# Patient Record
Sex: FEMALE | Race: BLACK OR AFRICAN AMERICAN | Employment: OTHER | URBAN - METROPOLITAN AREA
[De-identification: names, ages, dates, MRNs, and addresses within clinical notes are randomized per-mention and may not be internally consistent; named-entity substitution may affect disease eponyms.]

---

## 2017-01-25 RX ORDER — SIMVASTATIN 40 MG/1
40 TABLET, FILM COATED ORAL
Qty: 30 TAB | Refills: 11 | Status: SHIPPED | OUTPATIENT
Start: 2017-01-25 | End: 2018-01-20 | Stop reason: SDUPTHER

## 2017-02-24 RX ORDER — COLCHICINE 0.6 MG/1
0.6 TABLET ORAL DAILY
Qty: 30 TAB | Refills: 11 | Status: SHIPPED | OUTPATIENT
Start: 2017-02-24 | End: 2018-02-02 | Stop reason: SDUPTHER

## 2017-04-06 ENCOUNTER — OFFICE VISIT (OUTPATIENT)
Dept: INTERNAL MEDICINE CLINIC | Age: 77
End: 2017-04-06

## 2017-04-06 VITALS
OXYGEN SATURATION: 95 % | HEART RATE: 69 BPM | TEMPERATURE: 97.1 F | RESPIRATION RATE: 17 BRPM | SYSTOLIC BLOOD PRESSURE: 139 MMHG | HEIGHT: 61 IN | DIASTOLIC BLOOD PRESSURE: 59 MMHG | WEIGHT: 137.1 LBS | BODY MASS INDEX: 25.89 KG/M2

## 2017-04-06 DIAGNOSIS — M19.90 ARTHRITIS: ICD-10-CM

## 2017-04-06 DIAGNOSIS — Z00.00 ROUTINE GENERAL MEDICAL EXAMINATION AT A HEALTH CARE FACILITY: Primary | ICD-10-CM

## 2017-04-06 DIAGNOSIS — Z13.39 SCREENING FOR ALCOHOLISM: ICD-10-CM

## 2017-04-06 DIAGNOSIS — M25.552 HIP PAIN, LEFT: ICD-10-CM

## 2017-04-06 DIAGNOSIS — M25.552 LEFT HIP PAIN: ICD-10-CM

## 2017-04-06 DIAGNOSIS — I10 ESSENTIAL HYPERTENSION: ICD-10-CM

## 2017-04-06 DIAGNOSIS — F17.210 CIGARETTE SMOKER: ICD-10-CM

## 2017-04-06 DIAGNOSIS — J44.9 CHRONIC OBSTRUCTIVE PULMONARY DISEASE, UNSPECIFIED COPD TYPE (HCC): ICD-10-CM

## 2017-04-06 RX ORDER — HYDROCODONE BITARTRATE AND ACETAMINOPHEN 5; 325 MG/1; MG/1
1 TABLET ORAL
Qty: 60 TAB | Refills: 0 | Status: SHIPPED | OUTPATIENT
Start: 2017-04-06 | End: 2017-07-06 | Stop reason: SDUPTHER

## 2017-04-06 RX ORDER — PNEUMOCOCCAL 13-VALENT CONJUGATE VACCINE 2.2; 2.2; 2.2; 2.2; 2.2; 4.4; 2.2; 2.2; 2.2; 2.2; 2.2; 2.2; 2.2 UG/.5ML; UG/.5ML; UG/.5ML; UG/.5ML; UG/.5ML; UG/.5ML; UG/.5ML; UG/.5ML; UG/.5ML; UG/.5ML; UG/.5ML; UG/.5ML; UG/.5ML
INJECTION, SUSPENSION INTRAMUSCULAR
Refills: 0 | COMMUNITY
Start: 2017-02-06 | End: 2017-11-17 | Stop reason: ALTCHOICE

## 2017-04-06 RX ORDER — TETANUS TOXOID, REDUCED DIPHTHERIA TOXOID AND ACELLULAR PERTUSSIS VACCINE, ADSORBED 5; 2.5; 8; 8; 2.5 [IU]/.5ML; [IU]/.5ML; UG/.5ML; UG/.5ML; UG/.5ML
SUSPENSION INTRAMUSCULAR
Refills: 0 | COMMUNITY
Start: 2017-02-06 | End: 2017-11-17 | Stop reason: ALTCHOICE

## 2017-04-07 LAB
AMPHETAMINES UR QL SCN: NEGATIVE NG/ML
BARBITURATES UR QL SCN: NEGATIVE NG/ML
BENZODIAZ UR QL SCN: NEGATIVE NG/ML
BZE UR QL SCN: NEGATIVE NG/ML
CANNABINOIDS UR QL SCN: NEGATIVE NG/ML
CREAT UR-MCNC: 272.8 MG/DL (ref 20–300)
FENTANYL+NORFENTANYL UR QL SCN: NEGATIVE PG/ML
MEPERIDINE UR QL: NEGATIVE NG/ML
METHADONE UR QL SCN: NEGATIVE NG/ML
OPIATES UR QL SCN: NEGATIVE NG/ML
OXYCODONE+OXYMORPHONE UR QL SCN: NEGATIVE NG/ML
PCP UR QL: NEGATIVE NG/ML
PH UR: 6.2 [PH] (ref 4.5–8.9)
PLEASE NOTE:, 733157: NORMAL
PROPOXYPH UR QL SCN: NEGATIVE NG/ML
SP GR UR: 1.03
TRAMADOL UR QL SCN: NEGATIVE NG/ML

## 2017-06-26 RX ORDER — AMLODIPINE BESYLATE 5 MG/1
TABLET ORAL
Qty: 30 TAB | Refills: 11 | Status: SHIPPED | OUTPATIENT
Start: 2017-06-26 | End: 2018-06-02 | Stop reason: SDUPTHER

## 2017-07-06 ENCOUNTER — OFFICE VISIT (OUTPATIENT)
Dept: INTERNAL MEDICINE CLINIC | Age: 77
End: 2017-07-06

## 2017-07-06 VITALS
HEART RATE: 64 BPM | WEIGHT: 132.6 LBS | TEMPERATURE: 98.4 F | DIASTOLIC BLOOD PRESSURE: 69 MMHG | OXYGEN SATURATION: 93 % | SYSTOLIC BLOOD PRESSURE: 111 MMHG | HEIGHT: 61 IN | RESPIRATION RATE: 14 BRPM | BODY MASS INDEX: 25.04 KG/M2

## 2017-07-06 DIAGNOSIS — M19.90 ARTHRITIS: ICD-10-CM

## 2017-07-06 DIAGNOSIS — Z71.89 ACP (ADVANCE CARE PLANNING): ICD-10-CM

## 2017-07-06 DIAGNOSIS — J44.9 CHRONIC OBSTRUCTIVE PULMONARY DISEASE, UNSPECIFIED COPD TYPE (HCC): ICD-10-CM

## 2017-07-06 DIAGNOSIS — I10 ESSENTIAL HYPERTENSION: ICD-10-CM

## 2017-07-06 DIAGNOSIS — F17.210 CIGARETTE SMOKER: ICD-10-CM

## 2017-07-06 DIAGNOSIS — M25.552 LEFT HIP PAIN: Primary | ICD-10-CM

## 2017-07-06 DIAGNOSIS — G89.4 CHRONIC PAIN SYNDROME: ICD-10-CM

## 2017-07-06 DIAGNOSIS — Z13.6 SCREENING FOR AAA (ABDOMINAL AORTIC ANEURYSM): ICD-10-CM

## 2017-07-06 RX ORDER — HYDROCODONE BITARTRATE AND ACETAMINOPHEN 5; 325 MG/1; MG/1
1 TABLET ORAL
Qty: 60 TAB | Refills: 0 | Status: SHIPPED | OUTPATIENT
Start: 2017-07-06 | End: 2017-08-29 | Stop reason: SDUPTHER

## 2017-07-06 RX ORDER — CELECOXIB 200 MG/1
200 CAPSULE ORAL 2 TIMES DAILY
Qty: 60 CAP | Refills: 2 | Status: SHIPPED | OUTPATIENT
Start: 2017-07-06 | End: 2017-11-17 | Stop reason: SDUPTHER

## 2017-07-06 NOTE — MR AVS SNAPSHOT
Visit Information Date & Time Provider Department Dept. Phone Encounter #  
 7/6/2017  9:30 AM Patrick Rothman 80 Sports Medicine and [facility] 34 [id number] Follow-up Instructions Return in about 6 weeks (around 8/17/2017). Follow-up and Disposition History Upcoming Health Maintenance Date Due INFLUENZA AGE 9 TO ADULT 8/1/2017 GLAUCOMA SCREENING Q2Y 10/16/2017 Pneumococcal 65+ Low/Medium Risk (2 of 2 - PPSV23) 12/15/2017 MEDICARE YEARLY EXAM 4/7/2018 DTaP/Tdap/Td series (2 - Td) 12/15/2026 Allergies as of 7/6/2017  Review Complete On: 7/6/2017 By: Maria Elena Lyon MD  
  
 Severity Noted Reaction Type Reactions Lisinopril  12/12/2013   Systemic Hives, Swelling Current Immunizations  Never Reviewed No immunizations on file. Not reviewed this visit You Were Diagnosed With   
  
 Codes Comments Left hip pain    -  Primary ICD-10-CM: E66.273 ICD-9-CM: 719.45 Arthritis     ICD-10-CM: M19.90 ICD-9-CM: 716.90 Cigarette smoker     ICD-10-CM: F17.210 ICD-9-CM: 305.1 Chronic obstructive pulmonary disease, unspecified COPD type ([facility]     ICD-10-CM: J44.9 ICD-9-CM: 050 Essential hypertension     ICD-10-CM: I10 
ICD-9-CM: 401.9 Chronic pain syndrome     ICD-10-CM: G89.4 ICD-9-CM: 338.4 Screening for AAA (abdominal aortic aneurysm)     ICD-10-CM: Z13.6 ICD-9-CM: V81.2 ACP (advance care planning)     ICD-10-CM: Z71.89 ICD-9-CM: V65.49 Vitals BP Pulse Temp Resp Height(growth percentile) Weight(growth percentile) 111/69 (BP 1 Location: Left arm, BP Patient Position: Sitting) 64 98.4 °F (36.9 °C) (Oral) 14 5' 1\" (1.549 m) 132 lb 9.6 oz (60.1 kg) SpO2 BMI OB Status Smoking Status 93% 25.05 kg/m2 Postmenopausal Never Smoker BMI and BSA Data Body Mass Index Body Surface Area 25.05 kg/m 2 1.61 m 2 Preferred Pharmacy Pharmacy Name Phone [facility], [address] [place] [address] [phone] Your Updated Medication List  
  
   
This list is accurate as of: 7/6/17 12:24 PM.  Always use your most recent med list. amLODIPine 5 mg tablet Commonly known as:  NORVASC  
take 1 tablet by mouth once daily BOOSTRIX TDAP 2.5-8-5 Lf-mcg-Lf/0.5mL Syrg Generic drug:  Diphth, Pertus(Acell), Tetanus  
inject 0.5 milliliter intramuscularly  
  
 celecoxib 200 mg capsule Commonly known as:  CELEBREX Take 1 Cap by mouth two (2) times a day. colchicine 0.6 mg tablet Take 1 Tab by mouth daily. Indications: GOUT PREVENTION  
  
 HYDROcodone-acetaminophen 5-325 mg per tablet Commonly known as:  Janas [place] Take 1 Tab by mouth every six (6) hours as needed for Pain. Max Daily Amount: 4 Tabs. levothyroxine 25 mcg tablet Commonly known as:  SYNTHROID Take 1 Tab by mouth Daily (before breakfast). lidocaine 5 % Commonly known as:  Daneil [place] 1 patch by TransDERmal route every twenty-four (24) hours. Apply affected area for 12 hours a day and remove for 12 hours a day. PREVNAR 13 (PF) 0.5 mL Syrg injection Generic drug:  pneumococcal 13 kenneth conj dip  
inject 0.5 milliliter intramuscularly  
  
 simvastatin 40 mg tablet Commonly known as:  ZOCOR Take 1 Tab by mouth nightly. Prescriptions Printed Refills HYDROcodone-acetaminophen (NORCO) 5-325 mg per tablet 0 Sig: Take 1 Tab by mouth every six (6) hours as needed for Pain. Max Daily Amount: 4 Tabs. Class: Print Route: Oral  
  
Prescriptions Sent to Pharmacy Refills  
 celecoxib (CELEBREX) 200 mg capsule 2 Sig: Take 1 Cap by mouth two (2) times a day. Class: Normal  
 Pharmacy: [address] [place] [address] #: [phone] Route: Oral  
  
We Performed the Following FULL CODE [COD2 Custom] PAIN MGMT PANEL W/REFL, UR [ABE18320 Custom] REFERRAL TO ORTHOPEDICS [QTM299 Custom] REFERRAL TO PODIATRY [REF90 Custom] Follow-up Instructions Return in about 6 weeks (around 8/17/2017). To-Do List   
 07/06/2017 Imaging:  GREG MAMMO BI SCREENING INCL CAD   
  
 07/06/2017 PFT:  PULMONARY FUNCTION TEST   
  
 07/06/2017 Imaging:  US ABD COMP   
  
 07/06/2017 Imaging:  XR SPINE LUMB 2 OR 3 V As directed Imaging:  XR HIPS BI W PELV 3 OR 4 VWS Referral Information Referral ID Referred By Referred To  
  
 [id number] Luisa sherwood, [address] MD   
   [address] [facility] [address] Phone: [phone] Fax: [phone] Visits Status Start Date End Date 1 New Request 7/6/17 7/6/18 If your referral has a status of pending review or denied, additional information will be sent to support the outcome of this decision. Referral ID Referred By Referred To  
 [id number] Sena Durham Marylen Beers, DPM  
   [address] [facility] [address] [place] [address] At [address] [place] [address] Phone: [phone] Fax: [phone] Visits Status Start Date End Date 1 New Request 7/6/17 7/6/18 If your referral has a status of pending review or denied, additional information will be sent to support the outcome of this decision. Introducing [facility] & HEALTH SERVICES! [employer] introduces [employer] patient portal. Now you can access parts of your medical record, email your doctor's office, and request medication refills online. 1. In your internet browser, go to [email or web address] 2. Click on the First Time User? Click Here link in the Sign In box. You will see the New Member Sign Up page. 3. Enter your [employer] Access Code exactly as it appears below. You will not need to use this code after youve completed the sign-up process. If you do not sign up before the expiration date, you must request a new code. · [employer] Access Code: [email or web address] Expires: 10/4/2017 12:24 PM 
 
4. Enter the last four digits of your Social Security Number (xxxx) and Date of Birth (mm/dd/yyyy) as indicated and click Submit. You will be taken to the next sign-up page. 5. Create a [employer] ID. This will be your [employer] login ID and cannot be changed, so think of one that is secure and easy to remember. 6. Create a [employer] password. You can change your password at any time. 7. Enter your Password Reset Question and Answer. This can be used at a later time if you forget your password. 8. Enter your e-mail address. You will receive e-mail notification when new information is available in [address] 9. Click Sign Up. You can now view and download portions of your medical record. 10. Click the Download Summary menu link to download a portable copy of your medical information. If you have questions, please visit the Frequently Asked Questions section of the [employer] website. Remember, [employer] is NOT to be used for urgent needs. For medical emergencies, dial 911. Now available from your iPhone and Android! Please provide this summary of care documentation to your next provider. Your primary care clinician is listed as Nu Arredondo. If you have any questions after today's visit, please call [phone].

## 2017-07-06 NOTE — PROGRESS NOTES
Chief Complaint   Patient presents with    Hypertension     follow up      1. Have you been to the ER, urgent care clinic since your last visit? Hospitalized since your last visit? No    2. Have you seen or consulted any other health care providers outside of the [address] [place] [address] since your last visit? Include any pap smears or colon screening.  No

## 2017-07-06 NOTE — PROGRESS NOTES
SPORTS MEDICINE AND PRIMARY CARE  Marc Sanchez MD, [address] [address]  Phone:  [phone]  Fax: [phone]       Chief Complaint   Patient presents with    Hypertension     follow up    . SUBJECTIVE:    Tiana Manrique is a 68 y.o. female The patient returns today alert, appropriate and ambulatory with the assistance of a cane and has the capacity to give an accurate history. She has a known history of htn,copd,DJD and is seen for evaluation. The patient returns today complaining of left hip discomfort. She walks with an arthralgic gait because of the discomfort. She takes Norco, but notes that Aleve sometimes works better. She also has a form from [facility] regarding spirometry, creatinine clearance, mammograms, exercise for arthritis, advanced directive, annual wellness and cholesterol screening, all of which are up to date. Current Outpatient Prescriptions   Medication Sig Dispense Refill    HYDROcodone-acetaminophen (NORCO) 5-325 mg per tablet Take 1 Tab by mouth every six (6) hours as needed for Pain. Max Daily Amount: 4 Tabs. 60 Tab 0    celecoxib (CELEBREX) 200 mg capsule Take 1 Cap by mouth two (2) times a day. 60 Cap 2    amLODIPine (NORVASC) 5 mg tablet take 1 tablet by mouth once daily 30 Tab 11    colchicine 0.6 mg tablet Take 1 Tab by mouth daily. Indications: GOUT PREVENTION 30 Tab 11    simvastatin (ZOCOR) 40 mg tablet Take 1 Tab by mouth nightly. 30 Tab 11    levothyroxine (SYNTHROID) 25 mcg tablet Take 1 Tab by mouth Daily (before breakfast). 30 Tab 11    lidocaine (LIDODERM) 5 %(700 mg/patch) 1 patch by TransDERmal route every twenty-four (24) hours. Apply affected area for 12 hours a day and remove for 12 hours a day.  1 Package 11    BOOSTRIX TDAP 2.5-8-5 Lf-mcg-Lf/0.5mL syrg inject 0.5 milliliter intramuscularly  0    PREVNAR 13, PF, 0.5 mL syrg injection inject 0.5 milliliter intramuscularly  0     Past Medical History:   Diagnosis Date    Arthritis     Cigarette smoker     ct 6/25/13    COPD (chronic obstructive pulmonary disease) (HCC)     Hip pain, left     Hypertension     Left hip pain     Onychomycosis     S/P colonoscopy 12-12-13    cecal lipoma    S/P colonoscopy 12/12/2013    hemorrhoids,diverticulosis    Tubulovillous adenoma of colon 2008     History reviewed. No pertinent surgical history. Allergies   Allergen Reactions    Lisinopril Hives and Swelling         REVIEW OF SYSTEMS:  General: negative for - chills or fever  ENT: negative for - headaches, nasal congestion or tinnitus  Respiratory: negative for - cough, hemoptysis, shortness of breath or wheezing  Cardiovascular : negative for - chest pain, edema, palpitations or shortness of breath  Gastrointestinal: negative for - abdominal pain, blood in stools, heartburn or nausea/vomiting  Genito-Urinary: no dysuria, trouble voiding, or hematuria  Musculoskeletal: negative for - gait disturbance, joint pain, joint stiffness or joint swelling  Neurological: no TIA or stroke symptoms  Hematologic: no bruises, no bleeding, no swollen glands  Integument: no lumps, mole changes, nail changes or rash  Endocrine: no malaise/lethargy or unexpected weight changes      Social History     Social History    Marital status:      Spouse name: N/A    Number of children: N/A    Years of education: N/A     Social History Main Topics    Smoking status: Never Smoker    Smokeless tobacco: Never Used    Alcohol use No      Comment: occ    Drug use: No    Sexual activity: Not Asked     Other Topics Concern    None     Social History Narrative    Medical History: Polypectomy-tubulovillous adenoma 2008? may 13 2013 chest CT negative    Gyn History: Last mammogram date 05/01/2013. Last menstrual perioddate 1990. OB History: Total pregnancies 0.     Surgical History: Denies Past Surgical History    Hospitalization/Major Diagnostic Procedure: syncope 2007 Family History: Mother:  80 yrs, heart diseaseFather:  62 yrs, MISister(s): aliveBrother(s): alive, 1 :    strokeGrandfather: alive2 brother(s) , 1 sister(s) . Social History: Alcohol Use Patient does not use alcohol. Smoking Status Patient is a former smoker, Quit in: 13 patient    fell off the leg start his cigarettes but assures me that his day she w ill never smoked another cigarette the rest of her life. Marital Status:    . Lives w ith: alone. Education/School: has completed 3 yrs college. 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 Family History   Problem Relation Age of Onset    Diabetes Sister     Diabetes Brother        OBJECTIVE:    Visit Vitals    /69 (BP 1 Location: Left arm, BP Patient Position: Sitting)    Pulse 64    Temp 98.4 °F (36.9 °C) (Oral)    Resp 14    Ht 5' 1\" (1.549 m)    Wt 132 lb 9.6 oz (60.1 kg)    SpO2 93%    BMI 25.05 kg/m2     CONSTITUTIONAL: well , well nourished, appears age appropriate  EYES: perrla, eom intact  ENMT:moist mucous membranes, pharynx clear  NECK: supple. Thyroid normal  RESPIRATORY: Chest: clear bilaterally   CARDIOVASCULAR: Heart: regular rate and rhythm  GASTROINTESTINAL: Abdomen: soft, bowel sounds active  HEMATOLOGIC: no pathological lymph nodes palpated  MUSCULOSKELETAL: Extremities: no edema, pulse 1+   INTEGUMENT: No unusual rashes or suspicious skin lesions noted. Nails appear normal.  NEUROLOGIC: non-focal exam   MENTAL STATUS: alert and oriented, appropriate affect           ASSESSMENT:  1. Left hip pain    2. Arthritis    3. Cigarette smoker    4. Chronic obstructive pulmonary disease, unspecified COPD type ([facility]    5. Essential hypertension    6. Chronic pain syndrome    7. Screening for AAA (abdominal aortic aneurysm)    8.  ACP (advance care planning)      The patient has considerable pain in the hip for which we will refer her to Orthopaedics for an opinion. Appropriate x-rays have been taken today. We will screen for the abdominal aortic aneurysm as recommended and appropriate other laboratory studies as suggested. She has an appointment to see the podiatrist and we will give her a referral.      Reviewed medications with her. Mammograms are noted and are requested. Blood pressure control is at goal.      As we have since we first met her, again we ask her to stop smoking cigarettes. She claims that she is cutting back. She will return to see us in about six weeks or sooner if she needs to. Advance Care Planning (ACP) Provider Conversation Snapshot    Date of ACP Conversation: 07/06/17  Persons included in Conversation:  patient  Length of ACP Conversation in minutes:  <16 minutes (Non-Billable)    Authorized Decision Maker (if patient is incapable of making informed decisions): This person is:   Healthcare Agent/Medical Power of  under Advance Directive  sister        For Patients with Decision Making Capacity:   Values/Goals: Exploration of values, goals, and preferences if recovery is not expected, even with continued medical treatment in the event of:  Imminent death  Severe, permanent brain injury  \"In these circumstances, what matters most to you? \"  Care focused more on comfort or quality of life.     Conversation Outcomes / Follow-Up Plan:   Recommended completion of Advance Directive form after review of ACP materials and conversation with prospective healthcare agent         PLAN:  .  Orders Placed This Encounter    XR SPINE LUMB 2 OR 3 V    XR PELV AP ONLY    XR HIP LT W OR WO PELV 2-3 VWS    XR HIP RT W OR WO PELV 2-3 VWS    US ABD COMP    PAIN MGMT PANEL W/REFL, UR    REFERRAL TO ORTHOPEDICS    REFERRAL TO PODIATRY    HYDROcodone-acetaminophen (NORCO) 5-325 mg per tablet    celecoxib (CELEBREX) 200 mg capsule       Follow-up Disposition:  Return in about 6 weeks (around 8/17/2017). ATTENTION:   This medical record was transcribed using an electronic medical records system. Although proofread, it may and can contain electronic and spelling errors. Other human spelling and other errors may be present. Corrections may be executed at a later time. Please feel free to contact us for any clarifications as needed.

## 2017-07-07 LAB
AMPHETAMINES UR QL SCN: NEGATIVE NG/ML
BARBITURATES UR QL SCN: NEGATIVE NG/ML
BENZODIAZ UR QL SCN: NEGATIVE NG/ML
BZE UR QL SCN: NEGATIVE NG/ML
CANNABINOIDS UR QL SCN: NEGATIVE NG/ML
CREAT UR-MCNC: 311.5 MG/DL (ref 20–300)
FENTANYL+NORFENTANYL UR QL SCN: NEGATIVE PG/ML
MEPERIDINE UR QL: NEGATIVE NG/ML
METHADONE UR QL SCN: NEGATIVE NG/ML
OPIATES UR QL SCN: NEGATIVE NG/ML
OXYCODONE+OXYMORPHONE UR QL SCN: NEGATIVE NG/ML
PCP UR QL: NEGATIVE NG/ML
PH UR: 5.8 [PH] (ref 4.5–8.9)
PLEASE NOTE:, 733157: ABNORMAL
PROPOXYPH UR QL SCN: NEGATIVE NG/ML
SP GR UR: 1.02
TRAMADOL UR QL SCN: NEGATIVE NG/ML

## 2017-08-17 ENCOUNTER — OFFICE VISIT (OUTPATIENT)
Dept: INTERNAL MEDICINE CLINIC | Age: 77
End: 2017-08-17

## 2017-08-17 VITALS
DIASTOLIC BLOOD PRESSURE: 81 MMHG | BODY MASS INDEX: 25.72 KG/M2 | WEIGHT: 131 LBS | RESPIRATION RATE: 18 BRPM | HEIGHT: 60 IN | OXYGEN SATURATION: 97 % | HEART RATE: 75 BPM | SYSTOLIC BLOOD PRESSURE: 125 MMHG | TEMPERATURE: 98.8 F

## 2017-08-17 DIAGNOSIS — M19.90 ARTHRITIS: ICD-10-CM

## 2017-08-17 DIAGNOSIS — M25.552 HIP PAIN, LEFT: Primary | ICD-10-CM

## 2017-08-17 DIAGNOSIS — I10 ESSENTIAL HYPERTENSION: ICD-10-CM

## 2017-08-17 DIAGNOSIS — J44.9 CHRONIC OBSTRUCTIVE PULMONARY DISEASE, UNSPECIFIED COPD TYPE (HCC): ICD-10-CM

## 2017-08-17 NOTE — MR AVS SNAPSHOT
Visit Information Date & Time Provider Department Dept. Phone Encounter #  
 8/17/2017  9:45 AM Consuelo Herrera MD [facility] and [place] [address] [id number] Follow-up Instructions Return in about 2 months (around 10/17/2017). Follow-up and Disposition History Upcoming Health Maintenance Date Due  
 GLAUCOMA SCREENING Q2Y 10/16/2017 INFLUENZA AGE 9 TO ADULT 12/31/2017* Pneumococcal 65+ Low/Medium Risk (2 of 2 - PPSV23) 12/15/2017 MEDICARE YEARLY EXAM 4/7/2018 DTaP/Tdap/Td series (2 - Td) 12/15/2026 *Topic was postponed. The date shown is not the original due date. Allergies as of 8/17/2017  Review Complete On: 8/17/2017 By: Consuelo Herrera MD  
  
 Severity Noted Reaction Type Reactions Lisinopril  12/12/2013   Systemic Hives, Swelling Current Immunizations  Never Reviewed No immunizations on file. Not reviewed this visit You Were Diagnosed With   
  
 Codes Comments Hip pain, left    -  Primary ICD-10-CM: M25.552 ICD-9-CM: 719.45 Chronic obstructive pulmonary disease, unspecified COPD type ([facility]     ICD-10-CM: J44.9 ICD-9-CM: 714 Essential hypertension     ICD-10-CM: I10 
ICD-9-CM: 401.9 Arthritis     ICD-10-CM: M19.90 ICD-9-CM: 716.90 Vitals BP Pulse Temp Resp Height(growth percentile) Weight(growth percentile) 125/81 (BP 1 Location: Left arm, BP Patient Position: Sitting) 75 98.8 °F (37.1 °C) (Oral) 18 5' (1.524 m) 131 lb (59.4 kg) SpO2 BMI OB Status Smoking Status 97% 25.58 kg/m2 Postmenopausal Never Smoker Vitals History BMI and BSA Data Body Mass Index Body Surface Area 25.58 kg/m 2 1.59 m 2 Preferred Pharmacy Pharmacy Name Phone [facility] [address] [place] [address] [phone] Your Updated Medication List  
  
   
This list is accurate as of: 8/17/17 11:50 AM.  Always use your most recent med list. amLODIPine 5 mg tablet Commonly known as:  NORVASC  
take 1 tablet by mouth once daily BOOSTRIX TDAP 2.5-8-5 Lf-mcg-Lf/0.5mL Syrg Generic drug:  Diphth, Pertus(Acell), Tetanus  
inject 0.5 milliliter intramuscularly  
  
 celecoxib 200 mg capsule Commonly known as:  CELEBREX Take 1 Cap by mouth two (2) times a day. colchicine 0.6 mg tablet Take 1 Tab by mouth daily. Indications: GOUT PREVENTION  
  
 HYDROcodone-acetaminophen 5-325 mg per tablet Commonly known as:  Марина Bell Take 1 Tab by mouth every six (6) hours as needed for Pain. Max Daily Amount: 4 Tabs. levothyroxine 25 mcg tablet Commonly known as:  SYNTHROID Take 1 Tab by mouth Daily (before breakfast). lidocaine 5 % Commonly known as:  Maximilian [place] 1 patch by TransDERmal route every twenty-four (24) hours. Apply affected area for 12 hours a day and remove for 12 hours a day. PREVNAR 13 (PF) 0.5 mL Syrg injection Generic drug:  pneumococcal 13 kenneth conj dip  
inject 0.5 milliliter intramuscularly  
  
 simvastatin 40 mg tablet Commonly known as:  ZOCOR Take 1 Tab by mouth nightly. Follow-up Instructions Return in about 2 months (around 10/17/2017). Introducing [facility] & [facility]! [facility] introduces [employer] patient portal. Now you can access parts of your medical record, email your doctor's office, and request medication refills online. 1. In your internet browser, go to [email or web address] 2. Click on the First Time User? Click Here link in the Sign In box. You will see the New Member Sign Up page. 3. Enter your [employer] Access Code exactly as it appears below. You will not need to use this code after youve completed the sign-up process. If you do not sign up before the expiration date, you must request a new code. · [employer] Access Code: [email or web address] Expires: 10/4/2017 12:24 PM 
 
 4. Enter the last four digits of your Social Security Number (xxxx) and Date of Birth (mm/dd/yyyy) as indicated and click Submit. You will be taken to the next sign-up page. 5. Create a [employer] ID. This will be your [employer] login ID and cannot be changed, so think of one that is secure and easy to remember. 6. Create a [employer] password. You can change your password at any time. 7. Enter your Password Reset Question and Answer. This can be used at a later time if you forget your password. 8. Enter your e-mail address. You will receive e-mail notification when new information is available in [address] 9. Click Sign Up. You can now view and download portions of your medical record. 10. Click the Download Summary menu link to download a portable copy of your medical information. If you have questions, please visit the Frequently Asked Questions section of the [employer] website. Remember, [employer] is NOT to be used for urgent needs. For medical emergencies, dial 911. Now available from your iPhone and Android! Please provide this summary of care documentation to your next provider. Your primary care clinician is listed as Melida Peguero. If you have any questions after today's visit, please call [phone].

## 2017-08-17 NOTE — PROGRESS NOTES
Chief Complaint   Patient presents with    Hip Pain     Left hip pain  follow up    Hypertension     follow up, patient also wants to discuss recent visit to ortho     1. Have you been to the ER, urgent care clinic since your last visit? Hospitalized since your last visit? No    2. Have you seen or consulted any other health care providers outside of the [address] [place] [address] since your last visit? Include any pap smears or colon screening.  No

## 2017-08-17 NOTE — PROGRESS NOTES
SPORTS MEDICINE AND PRIMARY CARE  Felicity Ward MD, [address] [place]  [address]  Phone:  [phone]  Fax: [phone]      Chief Complaint   Patient presents with    Hip Pain     Left hip pain  follow up    Hypertension     follow up, patient also wants to discuss recent visit to ortho         SUBECTIVE:    Pietro Gonzalez is a 68 y.o. female Patient returns today alert, appropriate, ambulatory and has the capacity to give an accurate history. She has a known history of COPD, cigarette abuse, left hip pain due to osteoarthritis and is under the care of Dr. Nikki Graham for the hip pain. The patient usually gets her mammograms at [facility]. They sent her to [facility] because of something that was seen on the left, although she's had a biopsy before and therefore has a scar. She had to go back a second time for ultrasound and there are still concerns. Since we last saw her she saw an orthodontist who gave her Tramadol. We reminded her of the contract  physician will not prescribe narcotics. She is using Celebrex for the pain currently. Patient is seen for evaluation. Current Outpatient Prescriptions   Medication Sig Dispense Refill    HYDROcodone-acetaminophen (NORCO) 5-325 mg per tablet Take 1 Tab by mouth every six (6) hours as needed for Pain. Max Daily Amount: 4 Tabs. 60 Tab 0    celecoxib (CELEBREX) 200 mg capsule Take 1 Cap by mouth two (2) times a day. 60 Cap 2    amLODIPine (NORVASC) 5 mg tablet take 1 tablet by mouth once daily 30 Tab 11    colchicine 0.6 mg tablet Take 1 Tab by mouth daily. Indications: GOUT PREVENTION 30 Tab 11    simvastatin (ZOCOR) 40 mg tablet Take 1 Tab by mouth nightly. 30 Tab 11    levothyroxine (SYNTHROID) 25 mcg tablet Take 1 Tab by mouth Daily (before breakfast).  30 Tab 11    BOOSTRIX TDAP 2.5-8-5 Lf-mcg-Lf/0.5mL syrg inject 0.5 milliliter intramuscularly  0    PREVNAR 13, PF, 0.5 mL syrg injection inject 0.5 milliliter intramuscularly  0    lidocaine (LIDODERM) 5 %(700 mg/patch) 1 patch by TransDERmal route every twenty-four (24) hours. Apply affected area for 12 hours a day and remove for 12 hours a day. 1 Package 11     Past Medical History:   Diagnosis Date    Arthritis     Cigarette smoker     ct 13    COPD (chronic obstructive pulmonary disease) (HCC)     Hip pain, left     Hypertension     Left hip pain     Onychomycosis     S/P colonoscopy 13    cecal lipoma    S/P colonoscopy 2013    hemorrhoids,diverticulosis    Tubulovillous adenoma of colon 2008     History reviewed. No pertinent surgical history. Allergies   Allergen Reactions    Lisinopril Hives and Swelling       REVIEW OF SYSTEMS:   No chest pain, no shortness of breath. Social History     Social History    Marital status:      Spouse name: N/A    Number of children: N/A    Years of education: N/A     Social History Main Topics    Smoking status: Never Smoker    Smokeless tobacco: Never Used    Alcohol use No      Comment: occ    Drug use: No    Sexual activity: Not Asked     Other Topics Concern    None     Social History Narrative    Medical History: Polypectomy-tubulovillous adenoma ? may 13 2013 chest CT negative    Gyn History: Last mammogram date 2013. Last menstrual perioddate . OB History: Total pregnancies 0. Surgical History: Denies Past Surgical History    Hospitalization/Major Diagnostic Procedure: syncope     Family History: Mother:  80 yrs, heart diseaseFather:  62 yrs, MISister(s): aliveBrother(s): alive, 1 :    strokeGrandfather: alive2 brother(s) , 1 sister(s) . Social History: Alcohol Use Patient does not use alcohol. Smoking Status Patient is a former smoker, Quit in: 13 patient    fell off the leg start his cigarettes but assures me that his day she w ill never smoked another cigarette the rest of her life.  Marital Status: . Lives w ith: alone. Education/School: has completed 3 yrs college. 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   r  Family History   Problem Relation Age of Onset    Diabetes Sister     Diabetes Brother        OBJECTIVE:  Visit Vitals    /81 (BP 1 Location: Left arm, BP Patient Position: Sitting)    Pulse 75    Temp 98.8 °F (37.1 °C) (Oral)    Resp 18    Ht 5' (1.524 m)    Wt 131 lb (59.4 kg)    SpO2 97%    BMI 25.58 kg/m2     ENT: perrla,  eom intact  NECK: supple. Thyroid normal  CHEST: clear to ascultation and percussion   HEART: regular rate and rhythm  ABD: soft, bowel sounds active  EXTREMITIES: no edema, pulse 1+     Office Visit on 07/06/2017   Component Date Value Ref Range Status    Amphetamine Screen, urine 07/06/2017 Negative  Deevfh=8135 ng/mL Final    Barbiturates Screen, urine 07/06/2017 Negative  Zpcack=505 ng/mL Final    Benzodiazepines Screen, urine 07/06/2017 Negative  Lhiwrm=263 ng/mL Final    Cannabinoid Screen, urine 07/06/2017 Negative  Cutoff=20 ng/mL Final    Cocaine Metab. Screen, urine 07/06/2017 Negative  Dguayj=236 ng/mL Final    Opiate Screen, urine 07/06/2017 Negative  Icyexa=371 ng/mL Final    Opiate test includes Codeine, Morphine, Hydromorphone, Hydrocodone.  Oxycodone/Oxymorphone, urine 07/06/2017 Negative  Tsqyss=418 ng/mL Final    Test includes Oxycodone and Oxymorphone    Phencyclidine Screen, urine 07/06/2017 Negative  Cutoff=25 ng/mL Final    Methadone Screen, urine 07/06/2017 Negative  Xhrpke=001 ng/mL Final    Propoxyphene Screen, urine 07/06/2017 Negative  Xwzjmo=815 ng/mL Final    Meperidine screen 07/06/2017 Negative  Ktludd=626 ng/mL Final    Comment: This test was developed and its performance characteristics  determined by [facility]. It has not been cleared or approved  by the Food and Drug Administration.       FENTANYL, URINE 07/06/2017 Negative  Xopicw=2556 pg/mL Final Comment: Test includes Fentanyl and Norfentanyl  This test was developed and its performance characteristics  determined by [facility]. It has not been cleared or approved  by the Food and Drug Administration.  Tramadol Screen, urine 07/06/2017 Negative  Xgzmkz=482 ng/mL Final    Creatinine, urine 07/06/2017 311.5* 20.0 - 300.0 mg/dL Final    Specific Gravity 07/06/2017 1.022   Final    pH, urine 07/06/2017 5.8  4.5 - 8.9 Final    Please Note 07/06/2017 Comment   Final    Comment: Drug-test results should be interpreted in the context of clinical  information. Patient metabolic variables, specific drug chemistry, and  specimen characteristics can affect test outcome. Technical  consultation is available if a test result is inconsistent with an  expected outcome. ([email or web address] or call toll-free  [phone])  Drug brands, if listed herein, are trademarks of their respective  owners. ASSESSMENT:  1. Hip pain, left    2. Chronic obstructive pulmonary disease, unspecified COPD type ([address]    3. Essential hypertension    4. Arthritis      Unfortunately the patient was not aware that Tramadol is now considered a narcotic. She is aware that we will not give her narcotics for pain because of that. We will reinstitute our contractual agreement with her in the next month or two. Blood pressure control is at goal    The Celebrex seems to be helping the pain a little bit and therefore she'll continue that. She'll return to see us in 2-3 months, sooner if needed. BMI remains stable. PLAN:  . Follow-up Disposition:  Return in about 2 months (around 10/17/2017). ATTENTION:   This medical record was transcribed using an electronic medical records system. Although proofread, it may and can contain electronic and spelling errors. Other human spelling and other errors may be present. Corrections may be executed at a later time.   Please feel free to contact us for any clarifications as needed.

## 2017-08-18 DIAGNOSIS — M19.90 ARTHRITIS: ICD-10-CM

## 2017-08-29 DIAGNOSIS — M19.90 ARTHRITIS: ICD-10-CM

## 2017-08-30 RX ORDER — HYDROCODONE BITARTRATE AND ACETAMINOPHEN 5; 325 MG/1; MG/1
1 TABLET ORAL
Qty: 60 TAB | Refills: 0 | Status: SHIPPED | OUTPATIENT
Start: 2017-08-30 | End: 2017-11-17 | Stop reason: SDUPTHER

## 2017-11-17 ENCOUNTER — OFFICE VISIT (OUTPATIENT)
Dept: INTERNAL MEDICINE CLINIC | Age: 77
End: 2017-11-17

## 2017-11-17 VITALS
HEART RATE: 84 BPM | SYSTOLIC BLOOD PRESSURE: 95 MMHG | OXYGEN SATURATION: 96 % | TEMPERATURE: 98.4 F | DIASTOLIC BLOOD PRESSURE: 60 MMHG | WEIGHT: 131.4 LBS | BODY MASS INDEX: 25.8 KG/M2 | HEIGHT: 60 IN | RESPIRATION RATE: 18 BRPM

## 2017-11-17 DIAGNOSIS — M25.552 LEFT HIP PAIN: ICD-10-CM

## 2017-11-17 DIAGNOSIS — J44.9 CHRONIC OBSTRUCTIVE PULMONARY DISEASE, UNSPECIFIED COPD TYPE (HCC): Primary | ICD-10-CM

## 2017-11-17 DIAGNOSIS — F17.210 CIGARETTE SMOKER: ICD-10-CM

## 2017-11-17 DIAGNOSIS — Z79.891 CHRONIC PRESCRIPTION OPIATE USE: ICD-10-CM

## 2017-11-17 DIAGNOSIS — M19.90 ARTHRITIS: ICD-10-CM

## 2017-11-17 DIAGNOSIS — I10 ESSENTIAL HYPERTENSION: ICD-10-CM

## 2017-11-17 RX ORDER — HYDROCODONE BITARTRATE AND ACETAMINOPHEN 5; 325 MG/1; MG/1
1 TABLET ORAL
Qty: 60 TAB | Refills: 0 | Status: SHIPPED | OUTPATIENT
Start: 2017-11-17 | End: 2018-02-16 | Stop reason: SDUPTHER

## 2017-11-17 RX ORDER — CELECOXIB 200 MG/1
200 CAPSULE ORAL 2 TIMES DAILY
Qty: 60 CAP | Refills: 2 | Status: SHIPPED | OUTPATIENT
Start: 2017-11-17 | End: 2018-03-05 | Stop reason: SDUPTHER

## 2017-11-17 NOTE — MR AVS SNAPSHOT
Visit Information Date & Time Provider Department Dept. Phone Encounter #  
 11/17/2017  9:45 AM Patrick Solano [facility] Sports Medicine and Primary Care [phone] [id number] Follow-up Instructions Return in about 3 months (around 2/17/2018). Follow-up and Disposition History Upcoming Health Maintenance Date Due  
 GLAUCOMA SCREENING Q2Y 10/16/2017 Influenza Age 5 to Adult 12/31/2017* Pneumococcal 65+ Low/Medium Risk (2 of 2 - PPSV23) 12/15/2017 MEDICARE YEARLY EXAM 4/7/2018 DTaP/Tdap/Td series (2 - Td) 12/15/2026 *Topic was postponed. The date shown is not the original due date. Allergies as of 11/17/2017  Review Complete On: 11/17/2017 By: Herminia Thomas MD  
  
 Severity Noted Reaction Type Reactions Lisinopril  12/12/2013   Systemic Hives, Swelling Current Immunizations  Never Reviewed No immunizations on file. Not reviewed this visit You Were Diagnosed With   
  
 Codes Comments Chronic obstructive pulmonary disease, unspecified COPD type ([facility]    -  Primary ICD-10-CM: J44.9 ICD-9-CM: 718 Essential hypertension     ICD-10-CM: I10 
ICD-9-CM: 401.9 Left hip pain     ICD-10-CM: M25.552 ICD-9-CM: 719.45 Cigarette smoker     ICD-10-CM: F17.210 ICD-9-CM: 305.1 Arthritis     ICD-10-CM: M19.90 ICD-9-CM: 716.90 Chronic prescription opiate use     ICD-10-CM: O20.882 ICD-9-CM: V58.69 Vitals BP Pulse Temp Resp Height(growth percentile) Weight(growth percentile) 95/60 84 98.4 °F (36.9 °C) (Oral) 18 5' (1.524 m) 131 lb 6.4 oz (59.6 kg) SpO2 BMI OB Status Smoking Status 96% 25.66 kg/m2 Postmenopausal Never Smoker Vitals History BMI and BSA Data Body Mass Index Body Surface Area  
 25.66 kg/m 2 1.59 m 2 Preferred Pharmacy Pharmacy Name Phone [facility] [address] [place] [address] [phone] Your Updated Medication List  
  
   
This list is accurate as of: 11/17/17 11:55 AM.  Always use your most recent med list. amLODIPine 5 mg tablet Commonly known as:  NORVASC  
take 1 tablet by mouth once daily  
  
 celecoxib 200 mg capsule Commonly known as:  CELEBREX Take 1 Cap by mouth two (2) times a day. colchicine 0.6 mg tablet Take 1 Tab by mouth daily. Indications: GOUT PREVENTION  
  
 HYDROcodone-acetaminophen 5-325 mg per tablet Commonly known as:  Nelma Mary Take 1 Tab by mouth every six (6) hours as needed for Pain. Max Daily Amount: 4 Tabs. levothyroxine 25 mcg tablet Commonly known as:  SYNTHROID Take 1 Tab by mouth Daily (before breakfast). lidocaine 5 % Commonly known as:  [place] Gastelum 1 patch by TransDERmal route every twenty-four (24) hours. Apply affected area for 12 hours a day and remove for 12 hours a day. simvastatin 40 mg tablet Commonly known as:  ZOCOR Take 1 Tab by mouth nightly. Prescriptions Printed Refills HYDROcodone-acetaminophen (NORCO) 5-325 mg per tablet 0 Sig: Take 1 Tab by mouth every six (6) hours as needed for Pain. Max Daily Amount: 4 Tabs. Class: Print Route: Oral  
  
Prescriptions Sent to Pharmacy Refills  
 celecoxib (CELEBREX) 200 mg capsule 2 Sig: Take 1 Cap by mouth two (2) times a day. Class: Normal  
 Pharmacy: [address] [place] [address].  #: [phone] Route: Oral  
  
We Performed the Following CBC WITH AUTOMATED DIFF [60377 CPT(R)] HEMOGLOBIN A1C WITH EAG [15047 CPT(R)] LIPID PANEL [93884 CPT(R)] METABOLIC PANEL, COMPREHENSIVE [93305 CPT(R)] PAIN MGMT PANEL W/REFL, UR [NCX21702 Custom] GA COLLECTION VENOUS BLOOD,VENIPUNCTURE N0808565 CPT(R)] REFERRAL TO OPHTHALMOLOGY [REF57 Custom] TSH 3RD GENERATION [41884 CPT(R)] URINALYSIS W/ RFLX MICROSCOPIC [14805 CPT(R)] Follow-up Instructions Return in about 3 months (around 2/17/2018). Referral Information Referral ID Referred By Referred To  
  
 [id number] Cinthia CLARK Burton LYON Not Available Visits Status Start Date End Date 1 New Request 11/17/17 11/17/18 If your referral has a status of pending review or denied, additional information will be sent to support the outcome of this decision. Introducing [facility]! [facility] introduces [employer] patient portal. Now you can access parts of your medical record, email your doctor's office, and request medication refills online. 1. In your internet browser, go to [email or web address] 2. Click on the First Time User? Click Here link in the Sign In box. You will see the New Member Sign Up page. 3. Enter your [employer] Access Code exactly as it appears below. You will not need to use this code after youve completed the sign-up process. If you do not sign up before the expiration date, you must request a new code. · [employer] Access Code: [email or web address] Expires: 2/15/2018 11:55 AM 
 
4. Enter the last four digits of your Social Security Number (xxxx) and Date of Birth (mm/dd/yyyy) as indicated and click Submit. You will be taken to the next sign-up page. 5. Create a [employer] ID. This will be your [employer] login ID and cannot be changed, so think of one that is secure and easy to remember. 6. Create a [employer] password. You can change your password at any time. 7. Enter your Password Reset Question and Answer. This can be used at a later time if you forget your password. 8. Enter your e-mail address. You will receive e-mail notification when new information is available in [address] 9. Click Sign Up. You can now view and download portions of your medical record. 10. Click the Download Summary menu link to download a portable copy of your medical information. If you have questions, please visit the Frequently Asked Questions section of the [employer] website. Remember, [employer] is NOT to be used for urgent needs. For medical emergencies, dial 911. Now available from your iPhone and Android! Please provide this summary of care documentation to your next provider. Your primary care clinician is listed as Concepción Silva. If you have any questions after today's visit, please call [phone].

## 2017-11-17 NOTE — PROGRESS NOTES
SPORTS MEDICINE AND PRIMARY CARE  Francisco Gutierrez MD, [address] [address]  Phone:  [phone]  Fax: [phone]       Chief Complaint   Patient presents with    Hypertension     f/u   . SUBJECTIVE:    Lindsay Rosenthal is a 68 y.o. female Patient returns today with known history of DJD and was seen by Jennifer Moseley M.D., on 09/17/17, for osteoarthritis of the left hip. Different options he mentioned to her included activity modification, medications, injection and total hip arthroplasty. She has elected to go with medications for now. He is with [address] [place] [address] [address], which is [place] where she lives. She currently complains of left hip pain, as well as left elbow discomfort. She has a known history of COPD, cigarette addiction, primary hypertension, and is seen for evaluation. Current Outpatient Prescriptions   Medication Sig Dispense Refill    celecoxib (CELEBREX) 200 mg capsule Take 1 Cap by mouth two (2) times a day. 60 Cap 2    HYDROcodone-acetaminophen (NORCO) 5-325 mg per tablet Take 1 Tab by mouth every six (6) hours as needed for Pain. Max Daily Amount: 4 Tabs. 60 Tab 0    amLODIPine (NORVASC) 5 mg tablet take 1 tablet by mouth once daily 30 Tab 11    colchicine 0.6 mg tablet Take 1 Tab by mouth daily. Indications: GOUT PREVENTION 30 Tab 11    simvastatin (ZOCOR) 40 mg tablet Take 1 Tab by mouth nightly. 30 Tab 11    levothyroxine (SYNTHROID) 25 mcg tablet Take 1 Tab by mouth Daily (before breakfast). 30 Tab 11    lidocaine (LIDODERM) 5 %(700 mg/patch) 1 patch by TransDERmal route every twenty-four (24) hours. Apply affected area for 12 hours a day and remove for 12 hours a day.  1 Package 11     Past Medical History:   Diagnosis Date    Arthritis     Cigarette smoker     ct 6/25/13    COPD (chronic obstructive pulmonary disease) [facility]     Hypertension     Left hip pain     Onychomycosis     S/P colonoscopy 12-12-13    cecal lipoma    S/P colonoscopy 2013    hemorrhoids,diverticulosis    Tubulovillous adenoma of colon      History reviewed. No pertinent surgical history. Allergies   Allergen Reactions    Lisinopril Hives and Swelling         REVIEW OF SYSTEMS:  General: negative for - chills or fever  ENT: negative for - headaches, nasal congestion or tinnitus  Respiratory: negative for - cough, hemoptysis, shortness of breath or wheezing  Cardiovascular : negative for - chest pain, edema, palpitations or shortness of breath  Gastrointestinal: negative for - abdominal pain, blood in stools, heartburn or nausea/vomiting  Genito-Urinary: no dysuria, trouble voiding, or hematuria  Musculoskeletal: negative for - gait disturbance, joint pain, joint stiffness or joint swelling  Neurological: no TIA or stroke symptoms  Hematologic: no bruises, no bleeding, no swollen glands  Integument: no lumps, mole changes, nail changes or rash  Endocrine: no malaise/lethargy or unexpected weight changes      Social History     Social History    Marital status:      Spouse name: N/A    Number of children: N/A    Years of education: N/A     Social History Main Topics    Smoking status: Never Smoker    Smokeless tobacco: Never Used    Alcohol use No      Comment: occ    Drug use: No    Sexual activity: Not Asked     Other Topics Concern    None     Social History Narrative    Medical History: Polypectomy-tubulovillous adenoma ? may 13 2013 chest CT negative    Gyn History: Last mammogram date 2013. Last menstrual perioddate . OB History: Total pregnancies 0. Surgical History: Denies Past Surgical History    Hospitalization/Major Diagnostic Procedure: syncope     Family History: Mother:  80 yrs, heart diseaseFather:  62 yrs, MISister(s): aliveBrother(s): alive, 1 :    strokeGrandfather: alive2 brother(s) , 1 sister(s) . Social History: Alcohol Use Patient does not use alcohol.  Smoking Status Patient is a former smoker, Quit in: 6-20-13 July 17, 2014 patient    fell off the leg start his cigarettes but assures me that his day she w ill never smoked another cigarette the rest of her life. Marital Status:    . Lives w ith: alone. Education/School: has completed 3 yrs college. 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 Family History   Problem Relation Age of Onset    Diabetes Sister     Diabetes Brother        OBJECTIVE:    Visit Vitals    BP 95/60    Pulse 84    Temp 98.4 °F (36.9 °C) (Oral)    Resp 18    Ht 5' (1.524 m)    Wt 131 lb 6.4 oz (59.6 kg)    SpO2 96%    BMI 25.66 kg/m2     CONSTITUTIONAL: well , well nourished, appears age appropriate  EYES: perrla, eom intact  ENMT:moist mucous membranes, pharynx clear  NECK: supple. Thyroid normal  RESPIRATORY: Chest: clear bilaterally   CARDIOVASCULAR: Heart: regular rate and rhythm  GASTROINTESTINAL: Abdomen: soft, bowel sounds active  HEMATOLOGIC: no pathological lymph nodes palpated  MUSCULOSKELETAL: Extremities: no edema, pulse 1+   INTEGUMENT: No unusual rashes or suspicious skin lesions noted. Nails appear normal.  NEUROLOGIC: non-focal exam   MENTAL STATUS: alert and oriented, appropriate affect           ASSESSMENT:  1. Chronic obstructive pulmonary disease, unspecified COPD type ([address]    2. Essential hypertension    3. Left hip pain    4. Cigarette smoker    5. Arthritis    6. Chronic prescription opiate use      Patient's elbow is related to lateral epicondylitis and will use a strap to see if we can make her more comfortable or a tennis elbow splint. Repeat blood pressure is 120/78, which is completely acceptable. Chronic pain related to the hip, for which she alternates Hydrocodone with Celebrex. We will renew the Hydrocodone. She hasn't seen the eye doctor for a couple years. Will ask her to make that appointment.     Appropriate laboratory studies are requested. She'll return to the office in three months, sooner if she has any problems. This is a chronic problem that is not changed. Per review of available records and patients , there are not sign of overuse, misuse, diversion, or concerning side effects. Today we reviewed: the risk of overdose, addiction, and dependency  The following changes were made to the patients current treatment plan: nothing, medications refilled. PLAN:  .  Orders Placed This Encounter    URINALYSIS W/ RFLX MICROSCOPIC    CBC WITH AUTOMATED DIFF    METABOLIC PANEL, COMPREHENSIVE    LIPID PANEL    TSH 3RD GENERATION    HEMOGLOBIN A1C WITH EAG    PAIN MGMT PANEL W/REFL, UR    REFERRAL TO OPHTHALMOLOGY    celecoxib (CELEBREX) 200 mg capsule    HYDROcodone-acetaminophen (NORCO) 5-325 mg per tablet       Follow-up Disposition:  Return in about 3 months (around 2/17/2018). ATTENTION:   This medical record was transcribed using an electronic medical records system. Although proofread, it may and can contain electronic and spelling errors. Other human spelling and other errors may be present. Corrections may be executed at a later time. Please feel free to contact us for any clarifications as needed.

## 2017-11-17 NOTE — PROGRESS NOTES
1. Have you been to the ER, urgent care clinic since your last visit? Hospitalized since your last visit? No    2. Have you seen or consulted any other health care providers outside of the [address] [place] [address] since your last visit? Include any pap smears or colon screening. No     C/o left hip and right arm pain.  Wants a refill on norco

## 2017-11-19 LAB
ALBUMIN SERPL-MCNC: 4.1 G/DL (ref 3.5–4.8)
ALBUMIN/GLOB SERPL: 1.3 {RATIO} (ref 1.2–2.2)
ALP SERPL-CCNC: 63 IU/L (ref 39–117)
ALT SERPL-CCNC: 6 IU/L (ref 0–32)
AMPHETAMINES UR QL SCN: NEGATIVE NG/ML
APPEARANCE UR: ABNORMAL
AST SERPL-CCNC: 13 IU/L (ref 0–40)
BACTERIA #/AREA URNS HPF: ABNORMAL /[HPF]
BARBITURATES UR QL SCN: NEGATIVE NG/ML
BASOPHILS # BLD AUTO: 0 X10E3/UL (ref 0–0.2)
BASOPHILS NFR BLD AUTO: 1 %
BENZODIAZ UR QL SCN: NEGATIVE NG/ML
BILIRUB SERPL-MCNC: <0.2 MG/DL (ref 0–1.2)
BILIRUB UR QL STRIP: NEGATIVE
BUN SERPL-MCNC: 22 MG/DL (ref 8–27)
BUN/CREAT SERPL: 25 (ref 12–28)
BZE UR QL SCN: NEGATIVE NG/ML
CALCIUM SERPL-MCNC: 10.4 MG/DL (ref 8.7–10.3)
CANNABINOIDS UR QL SCN: NEGATIVE NG/ML
CASTS URNS QL MICRO: ABNORMAL /LPF
CHLORIDE SERPL-SCNC: 103 MMOL/L (ref 96–106)
CHOLEST SERPL-MCNC: 155 MG/DL (ref 100–199)
CO2 SERPL-SCNC: 26 MMOL/L (ref 18–29)
COLOR UR: ABNORMAL
CREAT SERPL-MCNC: 0.88 MG/DL (ref 0.57–1)
CREAT UR-MCNC: 397 MG/DL (ref 20–300)
CRYSTALS URNS MICRO: ABNORMAL
EOSINOPHIL # BLD AUTO: 0.1 X10E3/UL (ref 0–0.4)
EOSINOPHIL NFR BLD AUTO: 2 %
EPI CELLS #/AREA URNS HPF: ABNORMAL /HPF
ERYTHROCYTE [DISTWIDTH] IN BLOOD BY AUTOMATED COUNT: 14.4 % (ref 12.3–15.4)
EST. AVERAGE GLUCOSE BLD GHB EST-MCNC: 108 MG/DL
FENTANYL+NORFENTANYL UR QL SCN: NEGATIVE PG/ML
GFR SERPLBLD CREATININE-BSD FMLA CKD-EPI: 64 ML/MIN/1.73
GFR SERPLBLD CREATININE-BSD FMLA CKD-EPI: 73 ML/MIN/1.73
GLOBULIN SER CALC-MCNC: 3.2 G/DL (ref 1.5–4.5)
GLUCOSE SERPL-MCNC: 94 MG/DL (ref 65–99)
GLUCOSE UR QL: NEGATIVE
HBA1C MFR BLD: 5.4 % (ref 4.8–5.6)
HCT VFR BLD AUTO: 37.3 % (ref 34–46.6)
HDLC SERPL-MCNC: 54 MG/DL
HGB BLD-MCNC: 12.1 G/DL (ref 11.1–15.9)
HGB UR QL STRIP: NEGATIVE
IMM GRANULOCYTES # BLD: 0 X10E3/UL (ref 0–0.1)
IMM GRANULOCYTES NFR BLD: 0 %
KETONES UR QL STRIP: ABNORMAL
LDLC SERPL CALC-MCNC: 78 MG/DL (ref 0–99)
LEUKOCYTE ESTERASE UR QL STRIP: ABNORMAL
LYMPHOCYTES # BLD AUTO: 1.5 X10E3/UL (ref 0.7–3.1)
LYMPHOCYTES NFR BLD AUTO: 35 %
MCH RBC QN AUTO: 29.7 PG (ref 26.6–33)
MCHC RBC AUTO-ENTMCNC: 32.4 G/DL (ref 31.5–35.7)
MCV RBC AUTO: 91 FL (ref 79–97)
MEPERIDINE UR QL: NEGATIVE NG/ML
METHADONE UR QL SCN: NEGATIVE NG/ML
MICRO URNS: ABNORMAL
MONOCYTES # BLD AUTO: 0.4 X10E3/UL (ref 0.1–0.9)
MONOCYTES NFR BLD AUTO: 9 %
MUCOUS THREADS URNS QL MICRO: PRESENT
NEUTROPHILS # BLD AUTO: 2.3 X10E3/UL (ref 1.4–7)
NEUTROPHILS NFR BLD AUTO: 53 %
NITRITE UR QL STRIP: NEGATIVE
OPIATES UR QL SCN: NEGATIVE NG/ML
OXYCODONE+OXYMORPHONE UR QL SCN: NEGATIVE NG/ML
PCP UR QL: NEGATIVE NG/ML
PH UR STRIP: 5.5 [PH] (ref 5–7.5)
PH UR: 5.7 [PH] (ref 4.5–8.9)
PLATELET # BLD AUTO: 238 X10E3/UL (ref 150–379)
PLEASE NOTE:, 733157: ABNORMAL
POTASSIUM SERPL-SCNC: 4.7 MMOL/L (ref 3.5–5.2)
PROPOXYPH UR QL SCN: NEGATIVE NG/ML
PROT SERPL-MCNC: 7.3 G/DL (ref 6–8.5)
PROT UR QL STRIP: ABNORMAL
RBC # BLD AUTO: 4.08 X10E6/UL (ref 3.77–5.28)
RBC #/AREA URNS HPF: ABNORMAL /HPF
SODIUM SERPL-SCNC: 145 MMOL/L (ref 134–144)
SP GR UR: 1.02
SP GR UR: >=1.03 (ref 1–1.03)
TRAMADOL UR QL SCN: NEGATIVE NG/ML
TRIGL SERPL-MCNC: 117 MG/DL (ref 0–149)
TSH SERPL DL<=0.005 MIU/L-ACNC: 1.54 UIU/ML (ref 0.45–4.5)
UNIDENT CRYS URNS QL MICRO: PRESENT
UROBILINOGEN UR STRIP-MCNC: 1 MG/DL (ref 0.2–1)
VLDLC SERPL CALC-MCNC: 23 MG/DL (ref 5–40)
WBC # BLD AUTO: 4.3 X10E3/UL (ref 3.4–10.8)
WBC #/AREA URNS HPF: ABNORMAL /HPF

## 2017-12-20 RX ORDER — LEVOTHYROXINE SODIUM 25 UG/1
TABLET ORAL
Qty: 30 TAB | Refills: 11 | Status: SHIPPED | OUTPATIENT
Start: 2017-12-20 | End: 2017-12-21 | Stop reason: SDUPTHER

## 2017-12-21 RX ORDER — LEVOTHYROXINE SODIUM 25 UG/1
TABLET ORAL
Qty: 30 TAB | Refills: 11 | Status: SHIPPED | OUTPATIENT
Start: 2017-12-21 | End: 2018-11-30 | Stop reason: SDUPTHER

## 2018-01-20 RX ORDER — SIMVASTATIN 40 MG/1
TABLET, FILM COATED ORAL
Qty: 30 TAB | Refills: 11 | Status: SHIPPED | OUTPATIENT
Start: 2018-01-20 | End: 2019-01-21 | Stop reason: SDUPTHER

## 2018-02-02 RX ORDER — COLCHICINE 0.6 MG/1
TABLET ORAL
Qty: 30 TAB | Refills: 11 | Status: SHIPPED | OUTPATIENT
Start: 2018-02-02 | End: 2018-03-19 | Stop reason: SDUPTHER

## 2018-02-16 ENCOUNTER — OFFICE VISIT (OUTPATIENT)
Dept: INTERNAL MEDICINE CLINIC | Age: 78
End: 2018-02-16

## 2018-02-16 VITALS
OXYGEN SATURATION: 95 % | HEART RATE: 86 BPM | HEIGHT: 60 IN | DIASTOLIC BLOOD PRESSURE: 76 MMHG | RESPIRATION RATE: 18 BRPM | BODY MASS INDEX: 26.01 KG/M2 | TEMPERATURE: 99.1 F | WEIGHT: 132.5 LBS | SYSTOLIC BLOOD PRESSURE: 123 MMHG

## 2018-02-16 DIAGNOSIS — M25.552 HIP PAIN, LEFT: ICD-10-CM

## 2018-02-16 DIAGNOSIS — Z79.891 CHRONICALLY ON OPIATE THERAPY: ICD-10-CM

## 2018-02-16 DIAGNOSIS — J44.9 CHRONIC OBSTRUCTIVE PULMONARY DISEASE, UNSPECIFIED COPD TYPE (HCC): Primary | ICD-10-CM

## 2018-02-16 DIAGNOSIS — I10 ESSENTIAL HYPERTENSION: ICD-10-CM

## 2018-02-16 DIAGNOSIS — Z71.89 ACP (ADVANCE CARE PLANNING): ICD-10-CM

## 2018-02-16 DIAGNOSIS — M19.90 ARTHRITIS: ICD-10-CM

## 2018-02-16 DIAGNOSIS — F17.210 CIGARETTE SMOKER: ICD-10-CM

## 2018-02-16 RX ORDER — HYDROCODONE BITARTRATE AND ACETAMINOPHEN 5; 325 MG/1; MG/1
1 TABLET ORAL
Qty: 60 TAB | Refills: 0 | Status: SHIPPED | OUTPATIENT
Start: 2018-02-16 | End: 2018-05-21

## 2018-02-16 NOTE — PROGRESS NOTES
Diagnoses and all orders for this visit:    1. Chronic obstructive pulmonary disease, unspecified COPD type ([facility] [address]  Assessment & Plan:  Stable, based on history, physical exam and review of pertinent labs, studies and medications; meds reconciled; continue current treatment plan. Key COPD Medications     Patient is on no COPD/Asthma meds. Lab Results   Component Value Date/Time    WBC 4.3 11/17/2017 11:37 AM    HGB 12.1 11/17/2017 11:37 AM    HCT 37.3 11/17/2017 11:37 AM    PLATELET 673 97/90/1721 11:37 AM         2. Chronically on opiate therapy  -     PAIN MGMT PANEL W/REFL, UR    3. Arthritis  -     HYDROcodone-acetaminophen (NORCO) 5-325 mg per tablet; Take 1 Tab by mouth every six (6) hours as needed for Pain. Max Daily Amount: 4 Tabs. 4. Cigarette smoker    5. Hip pain, left  -     PAIN MGMT PANEL W/REFL, UR    6. Essential hypertension    [facility]  Leonetta Heimlich, MD, [address] [address]  Phone:  [phone]  Fax: [phone]       Chief Complaint   Patient presents with    Hypertension     f/u   . SUBJECTIVE:    Joselyn Mathews is a 66 y.o. female Patient returns today with known history of cigarette addiction, COPD, DJD and primary hypertension. For Cori Kathelen she's given up cigarettes she states. She's had two cigarettes since Wednesday and plans to have no more. That's how she stopped drinking alcohol. She continues to have discomfort in her hip and walks with a cane. Other new complaints denied and patient is seen for evaluation. Current Outpatient Prescriptions   Medication Sig Dispense Refill    HYDROcodone-acetaminophen (NORCO) 5-325 mg per tablet Take 1 Tab by mouth every six (6) hours as needed for Pain. Max Daily Amount: 4 Tabs.  60 Tab 0    colchicine 0.6 mg tablet take 1 tablet by mouth once daily 30 Tab 11    simvastatin (ZOCOR) 40 mg tablet take 1 tablet by mouth NIGHTLY 30 Tab 11    levothyroxine (SYNTHROID) 25 mcg tablet take 1 tablet by mouth once daily BEFORE BREAKFAST 30 Tab 11    celecoxib (CELEBREX) 200 mg capsule Take 1 Cap by mouth two (2) times a day. 60 Cap 2    amLODIPine (NORVASC) 5 mg tablet take 1 tablet by mouth once daily 30 Tab 11    lidocaine (LIDODERM) 5 %(700 mg/patch) 1 patch by TransDERmal route every twenty-four (24) hours. Apply affected area for 12 hours a day and remove for 12 hours a day. 1 Package 11     Past Medical History:   Diagnosis Date    Arthritis     Chronically on opiate therapy     Cigarette smoker     ct 6/25/13    COPD (chronic obstructive pulmonary disease) ([facility])     Epicondylitis elbow, medial, right     Hypertension     Left hip pain     Onychomycosis     S/P colonoscopy 12-12-13    cecal lipoma    S/P colonoscopy 12/12/2013    hemorrhoids,diverticulosis    Tubulovillous adenoma of colon 2008     History reviewed. No pertinent surgical history.   Allergies   Allergen Reactions    Lisinopril Hives and Swelling         REVIEW OF SYSTEMS:  General: negative for - chills or fever  ENT: negative for - headaches, nasal congestion or tinnitus  Respiratory: negative for - cough, hemoptysis, shortness of breath or wheezing  Cardiovascular : negative for - chest pain, edema, palpitations or shortness of breath  Gastrointestinal: negative for - abdominal pain, blood in stools, heartburn or nausea/vomiting  Genito-Urinary: no dysuria, trouble voiding, or hematuria  Musculoskeletal: negative for - gait disturbance, joint pain, joint stiffness or joint swelling  Neurological: no TIA or stroke symptoms  Hematologic: no bruises, no bleeding, no swollen glands  Integument: no lumps, mole changes, nail changes or rash  Endocrine: no malaise/lethargy or unexpected weight changes      Social History     Social History    Marital status:      Spouse name: N/A    Number of children: N/A    Years of education: N/A     Social History Main Topics    Smoking status: Light Tobacco Smoker     Packs/day: 0.50    Smokeless tobacco: Never Used    Alcohol use No    Drug use: No    Sexual activity: Not Currently     Other Topics Concern    None     Social History Narrative    Medical History: Polypectomy-tubulovillous adenoma ? may 13 2013 chest CT negative    Gyn History: Last mammogram date 2013. Last menstrual perioddate . OB History: Total pregnancies 0. Surgical History: Denies Past Surgical History    Hospitalization/Major Diagnostic Procedure: syncope     Family History: Mother:  80 yrs, heart diseaseFather:  62 yrs, MISister(s): aliveBrother(s): alive, 1 :    strokeGrandfather: alive2 brother(s) , 1 sister(s) . Social History: Alcohol Use Patient does not use alcohol. Smoking Status Patient is a former smoker, Quit in: 13 patient    fell off the leg start his cigarettes but assures me that his day she w ill never smoked another cigarette the rest of her life. Marital Status:    . Lives w ith: alone. Education/School: has completed 3 yrs college. 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 Family History   Problem Relation Age of Onset    Diabetes Sister     Diabetes Brother        OBJECTIVE:    Visit Vitals    /76    Pulse 86    Temp 99.1 °F (37.3 °C) (Oral)    Resp 18    Ht 5' (1.524 m)    Wt 132 lb 8 oz (60.1 kg)    SpO2 95%    BMI 25.88 kg/m2     CONSTITUTIONAL: well , well nourished, appears age appropriate  EYES: perrla, eom intact  ENMT:moist mucous membranes, pharynx clear  NECK: supple. Thyroid normal  RESPIRATORY: Chest: clear bilaterally   CARDIOVASCULAR: Heart: regular rate and rhythm  GASTROINTESTINAL: Abdomen: soft, bowel sounds active  HEMATOLOGIC: no pathological lymph nodes palpated  MUSCULOSKELETAL: Extremities: no edema, pulse 1+   INTEGUMENT: No unusual rashes or suspicious skin lesions noted.  Nails appear normal.  NEUROLOGIC: non-focal exam   MENTAL STATUS: alert and oriented, appropriate affect           ASSESSMENT:  1. Chronic obstructive pulmonary disease, unspecified COPD type ([address]    2. Chronically on opiate therapy    3. Arthritis    4. Cigarette smoker    5. Hip pain, left    6. Essential hypertension      We congratulate her on her decision to give up cigarettes for [facility] [address] [place]. I don't think she will go back to them when she stops completely. Evidence of COPD remains, but fortunately is stable, no evidence of bronchospasm. Blood pressure control is at goal.    BMI is at goal.    She'll return to the office in three months. This is a chronic problem that is not changed. Per review of available records and patients , there are not sign of overuse, misuse, diversion, or concerning side effects. Today we reviewed: the risk of overdose, addiction, and dependency  The following changes were made to the patients current treatment plan: nothing, medications refilled. PLAN:  .  Orders Placed This Encounter    PAIN MGMT PANEL W/REFL, UR    HYDROcodone-acetaminophen (NORCO) 5-325 mg per tablet       Follow-up Disposition: Not on File      ATTENTION:   This medical record was transcribed using an electronic medical records system. Although proofread, it may and can contain electronic and spelling errors. Other human spelling and other errors may be present. Corrections may be executed at a later time. Please feel free to contact us for any clarifications as needed.

## 2018-02-16 NOTE — ASSESSMENT & PLAN NOTE
Stable, based on history, physical exam and review of pertinent labs, studies and medications; meds reconciled; continue current treatment plan. Key COPD Medications     Patient is on no COPD/Asthma meds.         Lab Results   Component Value Date/Time    WBC 4.3 11/17/2017 11:37 AM    HGB 12.1 11/17/2017 11:37 AM    HCT 37.3 11/17/2017 11:37 AM    PLATELET 868 39/45/0968 11:37 AM

## 2018-02-16 NOTE — MR AVS SNAPSHOT
[address] [place] [address] 
 
 
 [address] [id number] 
[phone] Patient: Seymour Márquez MRN: [id number] ELD:[birth year] Visit Information Date & Time Provider Department Dept. Phone Encounter #  
 2/16/2018  9:00 AM Patrick Holliday [facility] [phone] [id number] Follow-up Instructions Return in about 3 months (around 5/16/2018). Follow-up and Disposition History Your Appointments 5/21/2018  9:45 AM  
Any with Christal Oseguera MD  
[address] [place] [address] and Primary Care Kenn Varela) Appt Note: 3 month follow up  
 [address] [facility]  
  
   
 [address] [id number] Upcoming Health Maintenance Date Due  
 MEDICARE YEARLY EXAM 4/7/2018 GLAUCOMA SCREENING Q2Y 2/7/2020 DTaP/Tdap/Td series (2 - Td) 12/15/2026 Allergies as of 2/16/2018  Review Complete On: 2/16/2018 By: Christal Oseguera MD  
  
 Severity Noted Reaction Type Reactions Lisinopril  12/12/2013   Systemic Hives, Swelling Current Immunizations  Never Reviewed No immunizations on file. Not reviewed this visit You Were Diagnosed With   
  
 Codes Comments Chronic obstructive pulmonary disease, unspecified [facility]    -  Primary ICD-10-CM: J44.9 ICD-9-CM: 205 Chronically on opiate therapy     ICD-10-CM: Z79.891 ICD-9-CM: V58.69 Arthritis     ICD-10-CM: M19.90 ICD-9-CM: 716.90 Cigarette smoker     ICD-10-CM: F17.210 ICD-9-CM: 305.1 Hip pain, left     ICD-10-CM: M25.552 ICD-9-CM: 719.45 Essential hypertension     ICD-10-CM: I10 
ICD-9-CM: 401.9 ACP (advance care planning)     ICD-10-CM: Z71.89 ICD-9-CM: V65.49 Vitals BP Pulse Temp Resp Height(growth percentile) Weight(growth percentile) 123/76 86 99.1 °F (37.3 °C) (Oral) 18 5' (1.524 m) 132 lb 8 oz (60.1 kg) SpO2 BMI OB Status Smoking Status 95% 25.88 kg/m2 Postmenopausal Light Tobacco Smoker Vitals History BMI and BSA Data Body Mass Index Body Surface Area  
 25.88 kg/m 2 1.6 m 2 Preferred Pharmacy Pharmacy Name Phone [facility] [address] [place] [address] [phone] Your Updated Medication List  
  
   
This list is accurate as of: 2/16/18 10:17 AM.  Always use your most recent med list. amLODIPine 5 mg tablet Commonly known as:  NORVASC  
take 1 tablet by mouth once daily  
  
 celecoxib 200 mg capsule Commonly known as:  CELEBREX Take 1 Cap by mouth two (2) times a day. colchicine 0.6 mg tablet  
take 1 tablet by mouth once daily HYDROcodone-acetaminophen 5-325 mg per tablet Commonly known as:  [place] Take 1 Tab by mouth every six (6) hours as needed for Pain. Max Daily Amount: 4 Tabs. levothyroxine 25 mcg tablet Commonly known as:  SYNTHROID  
take 1 tablet by mouth once daily BEFORE BREAKFAST  
  
 lidocaine 5 % Commonly known as:  Lawrence Stacks 1 patch by TransDERmal route every twenty-four (24) hours. Apply affected area for 12 hours a day and remove for 12 hours a day. simvastatin 40 mg tablet Commonly known as:  ZOCOR  
take 1 tablet by mouth NIGHTLY Prescriptions Printed Refills HYDROcodone-acetaminophen (NORCO) 5-325 mg per tablet 0 Sig: Take 1 Tab by mouth every six (6) hours as needed for Pain. Max Daily Amount: 4 Tabs. Class: Print Route: Oral  
  
We Performed the Following DO NOT RESUSCITATE Lavone Lessen PAIN MGMT PANEL W/REFL, UR [[id number] Custom] Follow-up Instructions Return in about 3 months (around 5/16/2018). Introducing [facility]! Antonia Hernandez introduces [employer] patient portal. Now you can access parts of your medical record, email your doctor's office, and request medication refills online. 1. In your internet browser, go to [email or web address] 2. Click on the First Time User? Click Here link in the Sign In box. You will see the New Member Sign Up page. 3. Enter your [employer] Access Code exactly as it appears below. You will not need to use this code after youve completed the sign-up process. If you do not sign up before the expiration date, you must request a new code. · [employer] Access Code: [email or web address] Expires: 5/17/2018 10:17 AM 
 
4. Enter the last four digits of your Social Security Number (xxxx) and Date of Birth (mm/dd/yyyy) as indicated and click Submit. You will be taken to the next sign-up page. 5. Create a [employer] ID. This will be your [employer] login ID and cannot be changed, so think of one that is secure and easy to remember. 6. Create a [employer] password. You can change your password at any time. 7. Enter your Password Reset Question and Answer. This can be used at a later time if you forget your password. 8. Enter your e-mail address. You will receive e-mail notification when new information is available in [address] 9. Click Sign Up. You can now view and download portions of your medical record. 10. Click the Download Summary menu link to download a portable copy of your medical information. If you have questions, please visit the Frequently Asked Questions section of the [employer] website. Remember, [employer] is NOT to be used for urgent needs. For medical emergencies, dial 911. Now available from your iPhone and Android! Please provide this summary of care documentation to your next provider. Your primary care clinician is listed as Deja Morrison. If you have any questions after today's visit, please call [phone].

## 2018-02-16 NOTE — ACP (ADVANCE CARE PLANNING)
Advance Care Planning (ACP) Provider Conversation Snapshot    Date of ACP Conversation: 02/16/18  Persons included in Conversation:  patient  Length of ACP Conversation in minutes:  <16 minutes (Non-Billable)    Authorized Decision Maker (if patient is incapable of making informed decisions):    This person is:   Healthcare Agent/Medical Power of  under Advance Directive  sister        For Patients with Decision Making Capacity:   Values/Goals: Exploration of values, goals, and preferences if recovery is not expected, even with continued medical treatment in the event of:  Imminent death    Conversation Outcomes / Follow-Up Plan:   Entered DNR order (If yes, complete Durable DNR form)

## 2018-02-16 NOTE — PROGRESS NOTES
1. Have you been to the ER, urgent care clinic since your last visit? Hospitalized since your last visit? No    2. Have you seen or consulted any other health care providers outside of the [address] [place] [address] since your last visit? Include any pap smears or colon screening. No      Wants to discuss diabetes.  Requesting refill on norco

## 2018-02-20 LAB
AMPHETAMINES UR QL SCN: NEGATIVE NG/ML
BARBITURATES UR QL SCN: ABNORMAL NG/ML
BENZODIAZ UR QL SCN: NEGATIVE NG/ML
BZE UR QL SCN: NEGATIVE NG/ML
CANNABINOIDS UR QL SCN: ABNORMAL NG/ML
CREAT UR-MCNC: 393.6 MG/DL (ref 20–300)
FENTANYL+NORFENTANYL UR QL SCN: NEGATIVE PG/ML
MEPERIDINE UR QL: NEGATIVE NG/ML
METHADONE UR QL SCN: ABNORMAL NG/ML
OPIATES UR QL SCN: NEGATIVE NG/ML
OXYCODONE+OXYMORPHONE UR QL SCN: NEGATIVE NG/ML
PCP UR QL: ABNORMAL NG/ML
PH UR: 5.9 [PH] (ref 4.5–8.9)
PLEASE NOTE:, 733157: ABNORMAL
PROPOXYPH UR QL SCN: NEGATIVE NG/ML
SP GR UR: 1.03
TRAMADOL UR QL SCN: NEGATIVE NG/ML

## 2018-03-05 RX ORDER — CELECOXIB 200 MG/1
CAPSULE ORAL
Qty: 60 CAP | Refills: 2 | Status: SHIPPED | OUTPATIENT
Start: 2018-03-05 | End: 2018-05-21 | Stop reason: SDUPTHER

## 2018-03-19 RX ORDER — COLCHICINE 0.6 MG/1
TABLET ORAL
Qty: 30 TAB | Refills: 11 | Status: SHIPPED | OUTPATIENT
Start: 2018-03-19 | End: 2018-07-05

## 2018-05-21 ENCOUNTER — OFFICE VISIT (OUTPATIENT)
Dept: INTERNAL MEDICINE CLINIC | Age: 78
End: 2018-05-21

## 2018-05-21 VITALS
TEMPERATURE: 97.7 F | WEIGHT: 134.7 LBS | RESPIRATION RATE: 18 BRPM | HEART RATE: 74 BPM | HEIGHT: 60 IN | DIASTOLIC BLOOD PRESSURE: 68 MMHG | BODY MASS INDEX: 26.45 KG/M2 | OXYGEN SATURATION: 96 % | SYSTOLIC BLOOD PRESSURE: 122 MMHG

## 2018-05-21 DIAGNOSIS — M19.90 ARTHRITIS: ICD-10-CM

## 2018-05-21 DIAGNOSIS — M25.552 LEFT HIP PAIN: ICD-10-CM

## 2018-05-21 DIAGNOSIS — Z79.891 CHRONICALLY ON OPIATE THERAPY: ICD-10-CM

## 2018-05-21 DIAGNOSIS — Z13.39 SCREENING FOR ALCOHOLISM: ICD-10-CM

## 2018-05-21 DIAGNOSIS — Z00.00 MEDICARE ANNUAL WELLNESS VISIT, SUBSEQUENT: Primary | ICD-10-CM

## 2018-05-21 DIAGNOSIS — Z13.31 SCREENING FOR DEPRESSION: ICD-10-CM

## 2018-05-21 DIAGNOSIS — F17.210 CIGARETTE SMOKER: ICD-10-CM

## 2018-05-21 DIAGNOSIS — M25.552 HIP PAIN, LEFT: ICD-10-CM

## 2018-05-21 DIAGNOSIS — J44.9 CHRONIC OBSTRUCTIVE PULMONARY DISEASE, UNSPECIFIED COPD TYPE (HCC): ICD-10-CM

## 2018-05-21 RX ORDER — CELECOXIB 200 MG/1
CAPSULE ORAL
Qty: 60 CAP | Refills: 6 | Status: SHIPPED | OUTPATIENT
Start: 2018-05-21 | End: 2019-01-07 | Stop reason: SDUPTHER

## 2018-05-21 NOTE — PATIENT INSTRUCTIONS
Medicare Wellness Visit, Female    The best way to live healthy is to have a lifestyle where you eat a well-balanced diet, exercise regularly, limit alcohol use, and quit all forms of tobacco/nicotine, if applicable. Regular preventive services are another way to keep healthy. Preventive services (vaccines, screening tests, monitoring & exams) can help personalize your care plan, which helps you manage your own care. Screening tests can find health problems at the earliest stages, when they are easiest to treat. [address] follows the current, evidence-based guidelines published by the [facility] Sarkis Deedee ([facility]) when recommending preventive services for our patients. Because we follow these guidelines, sometimes recommendations change over time as research supports it. (For example, mammograms used to be recommended annually. Even though Medicare will still pay for an annual mammogram, the newer guidelines recommend a mammogram every two years for women of average risk.)    Of course, you and your provider may decide to screen more often for some diseases, based on your risk and co-morbidities (chronic disease you are already diagnosed with). Preventive services for you include:    - Medicare offers their members a free annual wellness visit, which is time for you and your primary care provider to discuss and plan for your preventive service needs. Take advantage of this benefit every year!    -All people over age 72 should receive the recommended pneumonia vaccines. Current USPSTF guidelines recommend a series of two vaccines for the best pneumonia protection.     -All adults should have a yearly flu vaccine and a tetanus vaccine every 10 years. All adults age 61 years should receive a shingles vaccine once in their lifetime.      -A bone mass density test is recommended when a woman turns 65 to screen for osteoporosis.  This test is only recommended once as a screening. Some providers will use this same test as a disease monitoring tool if you already have osteoporosis. -All adults age 38-68 years who are overweight should have a diabetes screening test once every three years.     -Other screening tests & preventive services for persons with diabetes include: an eye exam to screen for diabetic retinopathy, a kidney function test, a foot exam, and stricter control over your cholesterol.     -Cardiovascular screening for adults with routine risk involves an electrocardiogram (ECG) at intervals determined by the provider.     -Colorectal cancer screenings should be done for adults age 54-65 years with normal risk. There are a number of acceptable methods of screening for this type of cancer. Each test has its own benefits and drawbacks. Discuss with your provider what is most appropriate for you during your annual wellness visit. The different tests include: colonoscopy (considered the best screening method), a fecal occult blood test, a fecal DNA test, and sigmoidoscopy. -Breast cancer screenings are recommended every other year for women of normal risk age 54-69 years.     -Cervical cancer screenings for women over age 72 are only recommended with certain risk factors.     -All adults born between [facility] should be screened once for Hepatitis C.      Here is a list of your current Health Maintenance items (your personalized list of preventive services) with a due date:  Health Maintenance Due   Topic Date Due    Annual Well Visit  04/07/2018

## 2018-05-21 NOTE — MR AVS SNAPSHOT
[address] [place] [address] 
 
 
 [address] [id number] 
[phone] Patient: Leonard Baer MRN: [id number] NJL:[id number] Visit Information Date & Time Provider Department Dept. Phone Encounter #  
 5/21/2018  9:45 AM Joby Powers MD [facility] [phone] [id number] Follow-up Instructions Return in about 3 months (around 8/21/2018). Follow-up and Disposition History Your Appointments 9/17/2018  9:45 AM  
Any with Joby Powers MD  
[address] [place] [address] and Primary Care [address] [place] [address]) Appt Note: 4 Month Follow Up  
 [address] [facility]  
  
   
 [id number] Upcoming Health Maintenance Date Due Influenza Age 5 to Adult 8/1/2018 MEDICARE YEARLY EXAM 5/22/2019 GLAUCOMA SCREENING Q2Y 2/7/2020 DTaP/Tdap/Td series (2 - Td) 12/15/2026 Allergies as of 5/21/2018  Review Complete On: 5/21/2018 By: Joby Powers MD  
  
 Severity Noted Reaction Type Reactions Lisinopril  12/12/2013   Systemic Hives, Swelling Current Immunizations  Never Reviewed No immunizations on file. Not reviewed this visit You Were Diagnosed With   
  
 Codes Comments Medicare annual wellness visit, subsequent    -  Primary ICD-10-CM: Z00.00 ICD-9-CM: V70.0 Screening for alcoholism     ICD-10-CM: Z13.89 ICD-9-CM: V79.1 Screening for depression     ICD-10-CM: Z13.89 ICD-9-CM: V79.0 Arthritis     ICD-10-CM: M19.90 ICD-9-CM: 716.90 Cigarette smoker     ICD-10-CM: F17.210 ICD-9-CM: 305.1 Hip pain, left     ICD-10-CM: M25.552 ICD-9-CM: 719.45 Chronically on opiate therapy     ICD-10-CM: Z79.891 ICD-9-CM: V58.69 Chronic obstructive pulmonary disease, unspecified COPD type ([facility]     ICD-10-CM: J44.9 ICD-9-CM: 464 Left hip pain     ICD-10-CM: M25.552 ICD-9-CM: 719.45 Vitals BP Pulse Temp Resp Height(growth percentile) Weight(growth percentile) 122/68 74 97.7 °F (36.5 °C) (Oral) 18 5' (1.524 m) 134 lb 11.2 oz (61.1 kg) SpO2 BMI OB Status Smoking Status 96% 26.31 kg/m2 Postmenopausal Light Tobacco Smoker Vitals History BMI and BSA Data Body Mass Index Body Surface Area  
 26.31 kg/m 2 1.61 m 2 Preferred Pharmacy Pharmacy Name Phone [facility] [address] [facility], [address] [facility]. [phone] Your Updated Medication List  
  
   
This list is accurate as of 5/21/18 11:30 AM.  Always use your most recent med list. amLODIPine 5 mg tablet Commonly known as:  NORVASC  
take 1 tablet by mouth once daily  
  
 celecoxib 200 mg capsule Commonly known as:  CELEBREX  
take 1 capsule by mouth twice a day PRN  
  
 colchicine 0.6 mg tablet  
take 1 tablet by mouth once daily  
  
 levothyroxine 25 mcg tablet Commonly known as:  SYNTHROID  
take 1 tablet by mouth once daily BEFORE BREAKFAST  
  
 lidocaine 5 % Commonly known as:  Marigene Malik 1 patch by TransDERmal route every twenty-four (24) hours. Apply affected area for 12 hours a day and remove for 12 hours a day. simvastatin 40 mg tablet Commonly known as:  ZOCOR  
take 1 tablet by mouth NIGHTLY Prescriptions Sent to Pharmacy Refills  
 celecoxib (CELEBREX) 200 mg capsule 6 Sig: take 1 capsule by mouth twice a day PRN Class: Normal  
 Pharmacy: [address] [place] [address], [address] [facility]. Ph #: [phone] [id number] [place] ANNUAL ALCOHOL SCREEN 15 MIN [id number] [facility]] REFERRAL TO ORTHOPEDIC SURGERY [REF62 Custom] Comments:  
 Back specialist Jessica Goyal Follow-up Instructions Return in about 3 months (around 8/21/2018). Referral Information Referral ID Referred By Referred To [id number] Victor Manuel RAMÍREZ Not Available Visits Status Start Date End Date 1 New Request 5/21/18 5/21/19 If your referral has a status of pending review or denied, additional information will be sent to support the outcome of this decision. Patient Instructions Medicare Wellness Visit, Female The best way to live healthy is to have a lifestyle where you eat a well-balanced diet, exercise regularly, limit alcohol use, and quit all forms of tobacco/nicotine, if applicable. Regular preventive services are another way to keep healthy. Preventive services (vaccines, screening tests, monitoring & exams) can help personalize your care plan, which helps you manage your own care. Screening tests can find health problems at the earliest stages, when they are easiest to treat. [address] [facility] follows the current, evidence-based guidelines published by the [facility] ([facility]) when recommending preventive services for our patients. Because we follow these guidelines, sometimes recommendations change over time as research supports it. (For example, mammograms used to be recommended annually. Even though Medicare will still pay for an annual mammogram, the newer guidelines recommend a mammogram every two years for women of average risk.) Of course, you and your provider may decide to screen more often for some diseases, based on your risk and co-morbidities (chronic disease you are already diagnosed with). Preventive services for you include: - Medicare offers their members a free annual wellness visit, which is time for you and your primary care provider to discuss and plan for your preventive service needs. Take advantage of this benefit every year! 
 
-All people over age 72 should receive the recommended pneumonia vaccines. Current USPSTF guidelines recommend a series of two vaccines for the best pneumonia protection. -All adults should have a yearly flu vaccine and a tetanus vaccine every 10 years. All adults age 61 years should receive a shingles vaccine once in their lifetime.   
 
-A bone mass density test is recommended when a woman turns 65 to screen for osteoporosis. This test is only recommended once as a screening. Some providers will use this same test as a disease monitoring tool if you already have osteoporosis. -All adults age 38-68 years who are overweight should have a diabetes screening test once every three years.  
 
-Other screening tests & preventive services for persons with diabetes include: an eye exam to screen for diabetic retinopathy, a kidney function test, a foot exam, and stricter control over your cholesterol.  
 
-Cardiovascular screening for adults with routine risk involves an electrocardiogram (ECG) at intervals determined by the provider.  
 
-Colorectal cancer screenings should be done for adults age 54-65 years with normal risk. There are a number of acceptable methods of screening for this type of cancer. Each test has its own benefits and drawbacks. Discuss with your provider what is most appropriate for you during your annual wellness visit. The different tests include: colonoscopy (considered the best screening method), a fecal occult blood test, a fecal DNA test, and sigmoidoscopy. -Breast cancer screenings are recommended every other year for women of normal risk age 54-69 years.  
 
-Cervical cancer screenings for women over age 72 are only recommended with certain risk factors.  
 
-All adults born between [facility] should be screened once for Hepatitis C. Here is a list of your current Health Maintenance items (your personalized list of preventive services) with a due date: 
Health Maintenance Due Topic Date Due  
 Annual Well Visit  04/07/2018 Introducing [facility] & HEALTH SERVICES!    
 [employer] introduces [employer] patient portal. Now you can access parts of your medical record, email your doctor's office, and request medication refills online. 1. In your internet browser, go to [email or web address] Robert Applebaum MD/[email or web address] 2. Click on the First Time User? Click Here link in the Sign In box. You will see the New Member Sign Up page. 3. Enter your EATON Access Code exactly as it appears below. You will not need to use this code after youve completed the sign-up process. If you do not sign up before the expiration date, you must request a new code. · EATON Access Code: [email or web address] Expires: 8/19/2018 11:30 AM 
 
4. Enter the last four digits of your Social Security Number (xxxx) and Date of Birth (mm/dd/yyyy) as indicated and click Submit. You will be taken to the next sign-up page. 5. Create a EATON ID. This will be your EATON login ID and cannot be changed, so think of one that is secure and easy to remember. 6. Create a EATON password. You can change your password at any time. 7. Enter your Password Reset Question and Answer. This can be used at a later time if you forget your password. 8. Enter your e-mail address. You will receive e-mail notification when new information is available in [address] 9. Click Sign Up. You can now view and download portions of your medical record. 10. Click the Download Summary menu link to download a portable copy of your medical information. If you have questions, please visit the Frequently Asked Questions section of the [employer]ATON website. Remember, [employer]ATON is NOT to be used for urgent needs. For medical emergencies, dial 911. Now available from your iPhone and Android! Please provide this summary of care documentation to your next provider. Your primary care clinician is listed as Lou Brandt. If you have any questions after today's visit, please call [phone].

## 2018-05-21 NOTE — PROGRESS NOTES
1. Have you been to the ER, urgent care clinic since your last visit? Hospitalized since your last visit? No    2. Have you seen or consulted any other health care providers outside of the [address] [place] [address] since your last visit? Include any pap smears or colon screening. No     Wants to discuss pain in hips      This is the Subsequent Medicare Annual Wellness Exam, performed 12 months or more after the Initial AWV or the last Subsequent AWV    I have reviewed the patient's medical history in detail and updated the computerized patient record. History     Past Medical History:   Diagnosis Date    Arthritis     Chronically on opiate therapy     Cigarette smoker     ct 6/25/13    COPD (chronic obstructive pulmonary disease) ([facility]     Epicondylitis elbow, medial, right     Hypertension     Left hip pain     Onychomycosis     Prediabetes 11/13/2014    S/P colonoscopy 12-12-13    cecal lipoma    S/P colonoscopy 12/12/2013    hemorrhoids,diverticulosis    Tubulovillous adenoma of colon 2008      History reviewed. No pertinent surgical history. Current Outpatient Prescriptions   Medication Sig Dispense Refill    celecoxib (CELEBREX) 200 mg capsule take 1 capsule by mouth twice a day PRN 60 Cap 6    colchicine 0.6 mg tablet take 1 tablet by mouth once daily 30 Tab 11    simvastatin (ZOCOR) 40 mg tablet take 1 tablet by mouth NIGHTLY 30 Tab 11    levothyroxine (SYNTHROID) 25 mcg tablet take 1 tablet by mouth once daily BEFORE BREAKFAST 30 Tab 11    amLODIPine (NORVASC) 5 mg tablet take 1 tablet by mouth once daily 30 Tab 11    lidocaine (LIDODERM) 5 %(700 mg/patch) 1 patch by TransDERmal route every twenty-four (24) hours. Apply affected area for 12 hours a day and remove for 12 hours a day.  1 Package 11     Allergies   Allergen Reactions    Lisinopril Hives and Swelling     Family History   Problem Relation Age of Onset    Diabetes Sister     Diabetes Brother      Social History Substance Use Topics    Smoking status: Light Tobacco Smoker     Packs/day: 0.50    Smokeless tobacco: Never Used    Alcohol use No     Patient Active Problem List   Diagnosis Code    Hypertension I10    Arthritis M19.90    COPD (chronic obstructive pulmonary disease) ([facility] J44.9    Left hip pain M25.552    Tubulovillous adenoma of colon D12.6    Cigarette smoker F17.210    Hip pain, left M25.552    Onychomycosis B35.1    Epicondylitis elbow, medial, right M77.01    Chronically on opiate therapy Z79.891    Prediabetes R73.03       Depression Risk Factor Screening:     PHQ over the last two weeks 5/21/2018   Little interest or pleasure in doing things Not at all   Feeling down, depressed or hopeless Not at all   Total Score PHQ 2 0     Alcohol Risk Factor Screening: You do not drink alcohol or very rarely. Functional Ability and Level of Safety:   Hearing Loss  Hearing is good. Activities of Daily Living  The home contains: handrails  Patient does total self care    Fall Risk  Fall Risk Assessment, last 12 mths 5/21/2018   Able to walk? Yes   Fall in past 12 months? No       Abuse Screen  Patient is not abused    Cognitive Screening   Evaluation of Cognitive Function:  Has your family/caregiver stated any concerns about your memory: no  Normal    Patient Care Team   Patient Care Team:  Johnna Drake MD as PCP - General (Internal Medicine)  Pattie Newsome DPM (Podiatry)    Assessment/Plan   Education and counseling provided:  Are appropriate based on today's review and evaluation    Diagnoses and all orders for this visit:    1. Medicare annual wellness visit, subsequent    2. Screening for alcoholism  -     Annual  Alcohol Screen 15 min ()    3. Screening for depression  -     Depression Screen Annual    4. Arthritis  -     REFERRAL TO ORTHOPEDIC SURGERY    5. Cigarette smoker    6. Hip pain, left    7. Chronically on opiate therapy    8.  Chronic obstructive pulmonary disease, unspecified COPD type ([facility]    9. Left hip pain  -     REFERRAL TO ORTHOPEDIC SURGERY    Other orders  -     celecoxib (CELEBREX) 200 mg capsule; take 1 capsule by mouth twice a day PRN        There are no preventive care reminders to display for this patient. [facility]  Kerri Cortés MD, [address] [address]  Phone:  [phone]  Fax: [phone]       Chief Complaint   Patient presents with   [facility] Wellness Visit   . SUBJECTIVE:    Natalie Rai is a 66 y.o. female Patient returns today with known history of primary hypertension, degenerative joint disease, degenerative disc disease, lumbar spine with radicular component, gout, cigarette abuse, COPD and is seen for evaluation. She has been using Tylenol for discomfort without the use of Hydrocodone. Current Outpatient Prescriptions   Medication Sig Dispense Refill    celecoxib (CELEBREX) 200 mg capsule take 1 capsule by mouth twice a day PRN 60 Cap 6    colchicine 0.6 mg tablet take 1 tablet by mouth once daily 30 Tab 11    simvastatin (ZOCOR) 40 mg tablet take 1 tablet by mouth NIGHTLY 30 Tab 11    levothyroxine (SYNTHROID) 25 mcg tablet take 1 tablet by mouth once daily BEFORE BREAKFAST 30 Tab 11    amLODIPine (NORVASC) 5 mg tablet take 1 tablet by mouth once daily 30 Tab 11    lidocaine (LIDODERM) 5 %(700 mg/patch) 1 patch by TransDERmal route every twenty-four (24) hours. Apply affected area for 12 hours a day and remove for 12 hours a day.  1 Package 11     Past Medical History:   Diagnosis Date    Arthritis     Chronically on opiate therapy     Cigarette smoker     ct 6/25/13    COPD (chronic obstructive pulmonary disease) ([facility]     Epicondylitis elbow, medial, right     Hypertension     Left hip pain     Onychomycosis     Prediabetes 11/13/2014    S/P colonoscopy 12-12-13    cecal lipoma    S/P colonoscopy 12/12/2013    hemorrhoids,diverticulosis    Tubulovillous adenoma of colon 2008     History reviewed. No pertinent surgical history. Allergies   Allergen Reactions    Lisinopril Hives and Swelling         REVIEW OF SYSTEMS:  General: negative for - chills or fever  ENT: negative for - headaches, nasal congestion or tinnitus  Respiratory: negative for - cough, hemoptysis, shortness of breath or wheezing  Cardiovascular : negative for - chest pain, edema, palpitations or shortness of breath  Gastrointestinal: negative for - abdominal pain, blood in stools, heartburn or nausea/vomiting  Genito-Urinary: no dysuria, trouble voiding, or hematuria  Musculoskeletal: negative for - gait disturbance, joint pain, joint stiffness or joint swelling  Neurological: no TIA or stroke symptoms  Hematologic: no bruises, no bleeding, no swollen glands  Integument: no lumps, mole changes, nail changes or rash  Endocrine: no malaise/lethargy or unexpected weight changes      Social History     Social History    Marital status:      Spouse name: N/A    Number of children: N/A    Years of education: N/A     Social History Main Topics    Smoking status: Light Tobacco Smoker     Packs/day: 0.50    Smokeless tobacco: Never Used    Alcohol use No    Drug use: No    Sexual activity: Not Currently     Other Topics Concern    None     Social History Narrative    Medical History: Polypectomy-tubulovillous adenoma ? may 13 2013 chest CT negative    Gyn History: Last mammogram date 2013. Last menstrual perioddate . OB History: Total pregnancies 0. Surgical History: Denies Past Surgical History    Hospitalization/Major Diagnostic Procedure: syncope     Family History: Mother:  80 yrs, heart diseaseFather:  62 yrs, MISister(s): aliveBrother(s): alive, 1 :    strokeGrandfather: alive2 brother(s) , 1 sister(s) . Social History: Alcohol Use Patient does not use alcohol.  Smoking Status Patient is a  smoker, Quit in: 13 17, 2014 patient    fell off the leg start his cigarettes but assures me that his day she w ill never smoked another cigarette the rest of her life. Marital Status:    . Lives w ith: alone. Education/School: has completed 3 yrs college. 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 Family History   Problem Relation Age of Onset    Diabetes Sister     Diabetes Brother        OBJECTIVE:    Visit Vitals    /68    Pulse 74    Temp 97.7 °F (36.5 °C) (Oral)    Resp 18    Ht 5' (1.524 m)    Wt 134 lb 11.2 oz (61.1 kg)    SpO2 96%    BMI 26.31 kg/m2     CONSTITUTIONAL: well , well nourished, appears age appropriate  EYES: perrla, eom intact  ENMT:moist mucous membranes, pharynx clear  NECK: supple. Thyroid normal  RESPIRATORY: Chest: clear bilaterally   CARDIOVASCULAR: Heart: regular rate and rhythm  GASTROINTESTINAL: Abdomen: soft, bowel sounds active  HEMATOLOGIC: no pathological lymph nodes palpated  MUSCULOSKELETAL: Extremities: no edema, pulse 1+   INTEGUMENT: No unusual rashes or suspicious skin lesions noted. Nails appear normal.  NEUROLOGIC: non-focal exam   MENTAL STATUS: alert and oriented, appropriate affect           ASSESSMENT:  1. Medicare annual wellness visit, subsequent    2. Screening for alcoholism    3. Screening for depression    4. Arthritis    5. Cigarette smoker    6. Hip pain, left    7. Chronically on opiate therapy    8. Chronic obstructive pulmonary disease, unspecified COPD type ([facility]    9. Left hip pain      We have a long discussion with her regarding opiate use versus Tylenol. She states Tylenol worked just as well and therefore she would be willing to stop the opiate and use Tylenol and Celebrex alone. She wonders if there is an injection available. She went to [facility] in the past with not a very good response. She'd like to see someone different if her insurance will allow.     As usual we ask her to stop the cigarettes. She stopped alcohol abuse about five years ago and we congratulate her. She'll return to the office in 3-4 months, sooner if needed. PLAN:  .  Orders Placed This Encounter    Depression Screen Annual    REFERRAL TO ORTHOPEDIC SURGERY    celecoxib (CELEBREX) 200 mg capsule       Follow-up Disposition:  Return in about 3 months (around 8/21/2018). ATTENTION:   This medical record was transcribed using an electronic medical records system. Although proofread, it may and can contain electronic and spelling errors. Other human spelling and other errors may be present. Corrections may be executed at a later time. Please feel free to contact us for any clarifications as needed.

## 2018-06-02 RX ORDER — AMLODIPINE BESYLATE 5 MG/1
TABLET ORAL
Qty: 30 TAB | Refills: 11 | Status: SHIPPED | OUTPATIENT
Start: 2018-06-02 | End: 2018-06-18 | Stop reason: SDUPTHER

## 2018-06-18 RX ORDER — AMLODIPINE BESYLATE 5 MG/1
TABLET ORAL
Qty: 30 TAB | Refills: 11 | Status: SHIPPED | OUTPATIENT
Start: 2018-06-18 | End: 2019-06-03 | Stop reason: SDUPTHER

## 2018-07-05 ENCOUNTER — HOSPITAL ENCOUNTER (OUTPATIENT)
Dept: PREADMISSION TESTING | Age: 78
Discharge: HOME | End: 2018-07-05
Payer: MEDICARE

## 2018-07-05 VITALS
SYSTOLIC BLOOD PRESSURE: 127 MMHG | HEART RATE: 59 BPM | WEIGHT: 128 LBS | HEIGHT: 60 IN | DIASTOLIC BLOOD PRESSURE: 70 MMHG | BODY MASS INDEX: 25.13 KG/M2 | TEMPERATURE: 97.4 F

## 2018-07-05 LAB
ANION GAP SERPL CALC-SCNC: 9 MMOL/L (ref 5–15)
APPEARANCE UR: CLEAR
ATRIAL RATE: 52 BPM
ATRIAL RATE: 53 BPM
BACTERIA URNS QL MICRO: ABNORMAL /HPF
BILIRUB UR QL CFM: NEGATIVE
BUN SERPL-MCNC: 26 MG/DL (ref 6–20)
BUN/CREAT SERPL: 31 (ref 12–20)
CALCIUM SERPL-MCNC: 9.7 MG/DL (ref 8.5–10.1)
CALCULATED P AXIS, ECG09: 50 DEGREES
CALCULATED P AXIS, ECG09: 50 DEGREES
CALCULATED R AXIS, ECG10: -40 DEGREES
CALCULATED R AXIS, ECG10: -40 DEGREES
CALCULATED T AXIS, ECG11: 11 DEGREES
CALCULATED T AXIS, ECG11: 8 DEGREES
CHLORIDE SERPL-SCNC: 107 MMOL/L (ref 97–108)
CO2 SERPL-SCNC: 27 MMOL/L (ref 21–32)
COLOR UR: ABNORMAL
CREAT SERPL-MCNC: 0.83 MG/DL (ref 0.55–1.02)
DIAGNOSIS, 93000: NORMAL
DIAGNOSIS, 93000: NORMAL
EPITH CASTS URNS QL MICRO: ABNORMAL /LPF
ERYTHROCYTE [DISTWIDTH] IN BLOOD BY AUTOMATED COUNT: 13.2 % (ref 11.5–14.5)
EST. AVERAGE GLUCOSE BLD GHB EST-MCNC: 111 MG/DL
GLUCOSE SERPL-MCNC: 87 MG/DL (ref 65–100)
GLUCOSE UR STRIP.AUTO-MCNC: NEGATIVE MG/DL
HBA1C MFR BLD: 5.5 % (ref 4.2–6.3)
HCT VFR BLD AUTO: 38.9 % (ref 35–47)
HGB BLD-MCNC: 12.1 G/DL (ref 11.5–16)
HGB UR QL STRIP: NEGATIVE
HYALINE CASTS URNS QL MICRO: ABNORMAL /LPF (ref 0–5)
INR PPP: 1 (ref 0.9–1.1)
KETONES UR QL STRIP.AUTO: ABNORMAL MG/DL
LEUKOCYTE ESTERASE UR QL STRIP.AUTO: NEGATIVE
MCH RBC QN AUTO: 30.5 PG (ref 26–34)
MCHC RBC AUTO-ENTMCNC: 31.1 G/DL (ref 30–36.5)
MCV RBC AUTO: 98 FL (ref 80–99)
MUCOUS THREADS URNS QL MICRO: ABNORMAL /LPF
NITRITE UR QL STRIP.AUTO: NEGATIVE
NRBC # BLD: 0 K/UL (ref 0–0.01)
NRBC BLD-RTO: 0 PER 100 WBC
P-R INTERVAL, ECG05: 166 MS
P-R INTERVAL, ECG05: 172 MS
PH UR STRIP: 5.5 [PH] (ref 5–8)
PLATELET # BLD AUTO: 186 K/UL (ref 150–400)
PMV BLD AUTO: 11.2 FL (ref 8.9–12.9)
POTASSIUM SERPL-SCNC: 4.1 MMOL/L (ref 3.5–5.1)
PROT UR STRIP-MCNC: ABNORMAL MG/DL
PROTHROMBIN TIME: 10.6 SEC (ref 9–11.1)
Q-T INTERVAL, ECG07: 450 MS
Q-T INTERVAL, ECG07: 456 MS
QRS DURATION, ECG06: 88 MS
QRS DURATION, ECG06: 90 MS
QTC CALCULATION (BEZET), ECG08: 422 MS
QTC CALCULATION (BEZET), ECG08: 424 MS
RBC # BLD AUTO: 3.97 M/UL (ref 3.8–5.2)
RBC #/AREA URNS HPF: ABNORMAL /HPF (ref 0–5)
SODIUM SERPL-SCNC: 143 MMOL/L (ref 136–145)
SP GR UR REFRACTOMETRY: >1.03 (ref 1–1.03)
UA: UC IF INDICATED,UAUC: ABNORMAL
UROBILINOGEN UR QL STRIP.AUTO: 1 EU/DL (ref 0.2–1)
VENTRICULAR RATE, ECG03: 52 BPM
VENTRICULAR RATE, ECG03: 53 BPM
WBC # BLD AUTO: 3.8 K/UL (ref 3.6–11)
WBC URNS QL MICRO: ABNORMAL /HPF (ref 0–4)
YEAST BUDDING URNS QL: PRESENT

## 2018-07-05 PROCEDURE — 80048 BASIC METABOLIC PNL TOTAL CA: CPT | Performed by: ORTHOPAEDIC SURGERY

## 2018-07-05 PROCEDURE — 87086 URINE CULTURE/COLONY COUNT: CPT | Performed by: ORTHOPAEDIC SURGERY

## 2018-07-05 PROCEDURE — 85610 PROTHROMBIN TIME: CPT | Performed by: ORTHOPAEDIC SURGERY

## 2018-07-05 PROCEDURE — 86900 BLOOD TYPING SEROLOGIC ABO: CPT | Performed by: ORTHOPAEDIC SURGERY

## 2018-07-05 PROCEDURE — 85027 COMPLETE CBC AUTOMATED: CPT | Performed by: ORTHOPAEDIC SURGERY

## 2018-07-05 PROCEDURE — 83036 HEMOGLOBIN GLYCOSYLATED A1C: CPT | Performed by: ORTHOPAEDIC SURGERY

## 2018-07-05 PROCEDURE — 86923 COMPATIBILITY TEST ELECTRIC: CPT | Performed by: ORTHOPAEDIC SURGERY

## 2018-07-05 PROCEDURE — 81001 URINALYSIS AUTO W/SCOPE: CPT | Performed by: ORTHOPAEDIC SURGERY

## 2018-07-05 PROCEDURE — 93005 ELECTROCARDIOGRAM TRACING: CPT

## 2018-07-05 RX ORDER — ASPIRIN 81 MG/1
81 TABLET ORAL DAILY
COMMUNITY
End: 2018-07-18

## 2018-07-05 NOTE — ADVANCED PRACTICE NURSE
Preoperative instructions reviewed with patient. Patient given 2-6 packs of CHG wipes. Instructions reviewed on use of CHG wipes. Patient given SSI infection FAQS sheet, as well as a  MRSA/MSSA treatment instruction sheet with an explanation to patient that they will be notified if treatment instructions need to be initiated. Patient was given the opportunity to ask questions on the information provided.

## 2018-07-06 PROBLEM — M16.12 PRIMARY OSTEOARTHRITIS OF LEFT HIP: Status: ACTIVE | Noted: 2018-07-06

## 2018-07-06 LAB
BACTERIA SPEC CULT: NORMAL
CC UR VC: NORMAL
SERVICE CMNT-IMP: NORMAL
SERVICE CMNT-IMP: NORMAL

## 2018-07-06 NOTE — H&P
Chief Complaint: Pain of the Left Hip and Pain of the Right Hip     Brennon Link comes in to the clinic today for evaluation of both of her hips. Patient has a long history of pain in her hips bilaterally for the past 3-4 years. She has seen a [address] who discussed that the patient may need total hip replacement. Today, she reports of bilateral hip pain, pain more so on her left hip. She has difficulty walking more than a few steps due to the severity of her hip pain. Due to this, she is required to use a walker. Any attempts to move hip is painful. No numbness, tingling or paresthesias. No knee pain.      Review of Systems   6/18/2018     Constitutional: Unexplained: Positive  Genitourinary: Frequent Urination: Positive         Musculoskeletal: Joint Pain: Positive     Medical History        Past Medical History:   Diagnosis Date    Arthritis, rheumatoid      COPD (chronic obstructive pulmonary disease)      Gout      Hypertension              Surgical History         Past Surgical History:   Procedure Laterality Date    NO RELEVANT ORTHOPAEDIC SURGERIES        NO RELEVANT SURGERIES                Objective:          Vitals:     06/18/18 1011   BP: 120/80         Constitutional:  No acute distress. Well nourished. Well developed. Eyes:  Sclera are nonicteric. Respiratory:  No labored breathing. Cardiovascular:  No marked edema. Skin:  No marked skin ulcers. Neurological:  No marked sensory loss noted. Psychiatric: Alert and oriented x3. Musculoskeletal      On exam reveals the patient needs to use a walker in order to ambulate more than a few steps. There is 80% of reduction to rotation of the right hip. Any attempts to rotate elicits pains. On the left, there is little to no ROM to rotation. Any attempts to rotate elicits severe pain. The cruciate and collateral ligaments are stable. No effusion. No sign of infection. No ecchymosis or erythema. No cellulitis or rash.  No calf pain, no evidence of DVT. I detect no obvious motor or sensory deficits in the lower extremities. The extensor mechanism is intact. The neurovascular status is intact.     Imaging/Studies:    Order: XR HIP 2+ VW RIGHT - Indication: Primary osteoarthritis of right   hip  Order: XR HIP 2+ VW LEFT - Indication: Primary osteoarthritis of left hip        X-ray Hip Left 2+ Views ([id number])     Result Date: 6/18/2018  Non-Weight Bearing. AP Pelvis, Frog Lateral.      Notes  Impression: I ordered and interpreted X-rays of the left hip. They   revealed advanced degenerative changes of the right hip. Large degree of   osteophyte formation. Total loss of joint space. No fracture.              X-ray Hip Right 2+ Views ([id number])     Result Date: 6/18/2018  Non-Weight Bearing. AP Pelvis, Frog Lateral.      Notes  Impression: I ordered and interpreted X-rays of the right hip. They   revealed advanced degenerative changes of the right hip. Large degree of   osteophyte formation. Total loss of joint space. No fracture.              Assessment:   There is no problem list on file for this patient.        1. Primary osteoarthritis of left hip    2. Primary osteoarthritis of right hip             Plan:   I reviewed my findings with the patient. I reviewed the pathophysiology and explained that she has advanced degenerative arthritis in her hips bilaterally. We discussed treatment options. She has failed a long-term course of conservative treatment. I explained that the only modality left that would provide her long-term relief for symptoms would be total hip replacement. She is agreement with the proposed plan. The patient would like to proceed with left total hip replacement.      I reviewed the hospitalization, post-op and rehabilitation for total hip replacement. We discussed all complications associated with the surgery, including the chance of infection and DVT.  She understands that she would be on antibiotics and anti-coagulants following surgery to prevent infection and DVT. I discussed issues of leg length and instability. I discussed the chance of dislocation following total hip replacement and instructed her on total hip precautions.      I reminded her about the degenerative changes in both of her hips. I explained that the left leg may be slightly longer after the surgery due to the wear in her right hip. She verbalized that she understood my explanation    PROCEDURE: Left total hip replacement. Date of Surgery Update:  Letty Ugalde was seen and examined. Past Medical History:   Diagnosis Date    Arthritis     Chronically on opiate therapy     Cigarette smoker     ct 13    COPD [facility]chronic obstructive pulmonary disease) ([facility] [address]     Elevated cholesterol     Epicondylitis elbow, medial, right     Hypertension     Hypothyroid     Left hip pain     Onychomycosis     Prediabetes 2014    S/P colonoscopy 13    cecal lipoma    S/P colonoscopy 2013    hemorrhoids,diverticulosis    Tubulovillous adenoma of colon      Prior to Admission Medications   Prescriptions Last Dose Informant Patient Reported? Taking? amLODIPine (NORVASC) 5 mg tablet   No No   Sig: take 1 tablet by mouth once daily   aspirin delayed-release 81 mg tablet   Yes No   Sig: Take 81 mg by mouth daily. celecoxib (CELEBREX) 200 mg capsule   No No   Sig: take 1 capsule by mouth twice a day PRN   levothyroxine (SYNTHROID) 25 mcg tablet   No No   Sig: take 1 tablet by mouth once daily BEFORE BREAKFAST   lidocaine (LIDODERM) 5 %(700 mg/patch)   No No   Si patch by TransDERmal route every twenty-four (24) hours. Apply affected area for 12 hours a day and remove for 12 hours a day.    simvastatin (ZOCOR) 40 mg tablet   No No   Sig: take 1 tablet by mouth NIGHTLY      Facility-Administered Medications: None      Allergy to:Lisinopril  Physical Examination: General appearance - alert, well appearing, and in no distress  History and physical has been reviewed. The patient has been examined.  There have been no significant clinical changes since the completion of the originally dated History and Physical.    Signed By: Betsy Prieto PA-C     July 11, 2018 9:55 AM

## 2018-07-11 ENCOUNTER — APPOINTMENT (OUTPATIENT)
Dept: GENERAL RADIOLOGY | Age: 78
DRG: 470 | End: 2018-07-11
Payer: MEDICARE

## 2018-07-11 ENCOUNTER — HOSPITAL ENCOUNTER (INPATIENT)
Age: 78
LOS: 7 days | Discharge: NURSING, REHAB OR LONG-TERM CARE | DRG: 470 | End: 2018-07-18
Attending: ORTHOPAEDIC SURGERY | Admitting: ORTHOPAEDIC SURGERY
Payer: MEDICARE

## 2018-07-11 ENCOUNTER — ANESTHESIA (OUTPATIENT)
Dept: SURGERY | Age: 78
DRG: 470 | End: 2018-07-11
Payer: MEDICARE

## 2018-07-11 ENCOUNTER — ANESTHESIA EVENT (OUTPATIENT)
Dept: SURGERY | Age: 78
DRG: 470 | End: 2018-07-11
Payer: MEDICARE

## 2018-07-11 DIAGNOSIS — M16.12 PRIMARY OSTEOARTHRITIS OF LEFT HIP: Primary | ICD-10-CM

## 2018-07-11 LAB
GLUCOSE BLD STRIP.AUTO-MCNC: 86 MG/DL (ref 65–100)
SERVICE CMNT-IMP: NORMAL

## 2018-07-11 PROCEDURE — 77030008467 HC STPLR SKN COVD -B: Performed by: ORTHOPAEDIC SURGERY

## 2018-07-11 PROCEDURE — C1776 JOINT DEVICE (IMPLANTABLE): HCPCS | Performed by: ORTHOPAEDIC SURGERY

## 2018-07-11 PROCEDURE — 74011000258 HC RX REV CODE- 258

## 2018-07-11 PROCEDURE — 76010000172 HC OR TIME 2.5 TO 3 HR INTENSV-TIER 1: Performed by: ORTHOPAEDIC SURGERY

## 2018-07-11 PROCEDURE — 65270000029 HC RM PRIVATE

## 2018-07-11 PROCEDURE — 77030034850: Performed by: ORTHOPAEDIC SURGERY

## 2018-07-11 PROCEDURE — P9045 ALBUMIN (HUMAN), 5%, 250 ML: HCPCS

## 2018-07-11 PROCEDURE — 76210000016 HC OR PH I REC 1 TO 1.5 HR: Performed by: ORTHOPAEDIC SURGERY

## 2018-07-11 PROCEDURE — 77030035236 HC SUT PDS STRATFX BARB J&J -B: Performed by: ORTHOPAEDIC SURGERY

## 2018-07-11 PROCEDURE — 74011000250 HC RX REV CODE- 250: Performed by: PHYSICIAN ASSISTANT

## 2018-07-11 PROCEDURE — 77030012893

## 2018-07-11 PROCEDURE — 77030006822 HC BLD SAW SAG BRSM -B: Performed by: ORTHOPAEDIC SURGERY

## 2018-07-11 PROCEDURE — 73501 X-RAY EXAM HIP UNI 1 VIEW: CPT

## 2018-07-11 PROCEDURE — 77030031139 HC SUT VCRL2 J&J -A: Performed by: ORTHOPAEDIC SURGERY

## 2018-07-11 PROCEDURE — 77030038020 HC MANFLD NEPTUNE STRY -B: Performed by: ORTHOPAEDIC SURGERY

## 2018-07-11 PROCEDURE — 77030020788: Performed by: ORTHOPAEDIC SURGERY

## 2018-07-11 PROCEDURE — 77030020782 HC GWN BAIR PAWS FLX 3M -B

## 2018-07-11 PROCEDURE — 77030013079 HC BLNKT BAIR HGGR 3M -A: Performed by: ANESTHESIOLOGY

## 2018-07-11 PROCEDURE — 74011250637 HC RX REV CODE- 250/637: Performed by: PHYSICIAN ASSISTANT

## 2018-07-11 PROCEDURE — 77030032490 HC SLV COMPR SCD KNE COVD -B: Performed by: ORTHOPAEDIC SURGERY

## 2018-07-11 PROCEDURE — 74011250636 HC RX REV CODE- 250/636: Performed by: PHYSICIAN ASSISTANT

## 2018-07-11 PROCEDURE — 77030018836 HC SOL IRR NACL ICUM -A: Performed by: ORTHOPAEDIC SURGERY

## 2018-07-11 PROCEDURE — 82962 GLUCOSE BLOOD TEST: CPT

## 2018-07-11 PROCEDURE — 76060000037 HC ANESTHESIA 3 TO 3.5 HR: Performed by: ORTHOPAEDIC SURGERY

## 2018-07-11 PROCEDURE — 74011000250 HC RX REV CODE- 250

## 2018-07-11 PROCEDURE — 77030007866 HC KT SPN ANES BBMI -B: Performed by: ANESTHESIOLOGY

## 2018-07-11 PROCEDURE — 74011250636 HC RX REV CODE- 250/636

## 2018-07-11 PROCEDURE — 74011000250 HC RX REV CODE- 250: Performed by: ORTHOPAEDIC SURGERY

## 2018-07-11 PROCEDURE — 77030011264 HC ELECTRD BLD EXT COVD -A: Performed by: ORTHOPAEDIC SURGERY

## 2018-07-11 PROCEDURE — 77030018846 HC SOL IRR STRL H20 ICUM -A: Performed by: ORTHOPAEDIC SURGERY

## 2018-07-11 PROCEDURE — 74011000258 HC RX REV CODE- 258: Performed by: ANESTHESIOLOGY

## 2018-07-11 PROCEDURE — 77030011640 HC PAD GRND REM COVD -A: Performed by: ORTHOPAEDIC SURGERY

## 2018-07-11 PROCEDURE — 74011250636 HC RX REV CODE- 250/636: Performed by: ANESTHESIOLOGY

## 2018-07-11 PROCEDURE — 97161 PT EVAL LOW COMPLEX 20 MIN: CPT

## 2018-07-11 PROCEDURE — 97116 GAIT TRAINING THERAPY: CPT

## 2018-07-11 PROCEDURE — 74011250637 HC RX REV CODE- 250/637: Performed by: NURSE PRACTITIONER

## 2018-07-11 PROCEDURE — 77030012935 HC DRSG AQUACEL BMS -B: Performed by: ORTHOPAEDIC SURGERY

## 2018-07-11 PROCEDURE — 77030020061 HC IV BLD WRMR ADMIN SET 3M -B: Performed by: ANESTHESIOLOGY

## 2018-07-11 PROCEDURE — 77030003666 HC NDL SPINAL BD -A: Performed by: ANESTHESIOLOGY

## 2018-07-11 DEVICE — PINNACLE HIP SOLUTIONS ALTRX POLYETHYLENE ACETABULAR LINER NEUTRAL 36MM ID 52MM OD
Type: IMPLANTABLE DEVICE | Site: HIP | Status: FUNCTIONAL
Brand: PINNACLE ALTRX

## 2018-07-11 DEVICE — SUMMIT FEMORAL STEM 12/14 TAPER TAPER ED W/POROCOAT SIZE 5 STD 145MM
Type: IMPLANTABLE DEVICE | Site: HIP | Status: FUNCTIONAL
Brand: SUMMIT POROCOAT

## 2018-07-11 DEVICE — M-SPEC METAL FEMORAL HEAD 12/14 TAPER DIAMETER 36MM -2: Type: IMPLANTABLE DEVICE | Site: HIP | Status: FUNCTIONAL

## 2018-07-11 DEVICE — PINNACLE GRIPTION ACETABULAR SHELL SECTOR 52MM OD
Type: IMPLANTABLE DEVICE | Site: HIP | Status: FUNCTIONAL
Brand: PINNACLE GRIPTION

## 2018-07-11 DEVICE — PINNACLE CANCELLOUS BONE SCREW 6.5MM X 20MM
Type: IMPLANTABLE DEVICE | Site: HIP | Status: FUNCTIONAL
Brand: PINNACLE

## 2018-07-11 DEVICE — COMPONENT TOT HIP PRIMARY MTL ON POLYETH: Type: IMPLANTABLE DEVICE | Site: HIP | Status: FUNCTIONAL

## 2018-07-11 RX ORDER — CEFAZOLIN SODIUM/WATER 2 G/20 ML
2 SYRINGE (ML) INTRAVENOUS EVERY 8 HOURS
Status: COMPLETED | OUTPATIENT
Start: 2018-07-11 | End: 2018-07-12

## 2018-07-11 RX ORDER — HYDROXYZINE HYDROCHLORIDE 10 MG/1
10 TABLET, FILM COATED ORAL
Status: DISCONTINUED | OUTPATIENT
Start: 2018-07-11 | End: 2018-07-18 | Stop reason: HOSPADM

## 2018-07-11 RX ORDER — PHENYLEPHRINE HCL IN 0.9% NACL 0.4MG/10ML
SYRINGE (ML) INTRAVENOUS AS NEEDED
Status: DISCONTINUED | OUTPATIENT
Start: 2018-07-11 | End: 2018-07-11 | Stop reason: HOSPADM

## 2018-07-11 RX ORDER — FENTANYL CITRATE 50 UG/ML
50 INJECTION, SOLUTION INTRAMUSCULAR; INTRAVENOUS AS NEEDED
Status: DISCONTINUED | OUTPATIENT
Start: 2018-07-11 | End: 2018-07-11 | Stop reason: HOSPADM

## 2018-07-11 RX ORDER — OXYCODONE HYDROCHLORIDE 5 MG/1
2.5 TABLET ORAL
Status: DISCONTINUED | OUTPATIENT
Start: 2018-07-11 | End: 2018-07-18 | Stop reason: HOSPADM

## 2018-07-11 RX ORDER — MIDAZOLAM HYDROCHLORIDE 1 MG/ML
1 INJECTION, SOLUTION INTRAMUSCULAR; INTRAVENOUS AS NEEDED
Status: DISCONTINUED | OUTPATIENT
Start: 2018-07-11 | End: 2018-07-11 | Stop reason: HOSPADM

## 2018-07-11 RX ORDER — CELECOXIB 200 MG/1
200 CAPSULE ORAL 2 TIMES DAILY
Status: DISCONTINUED | OUTPATIENT
Start: 2018-07-11 | End: 2018-07-11 | Stop reason: ALTCHOICE

## 2018-07-11 RX ORDER — DIPHENHYDRAMINE HYDROCHLORIDE 50 MG/ML
12.5 INJECTION, SOLUTION INTRAMUSCULAR; INTRAVENOUS AS NEEDED
Status: DISCONTINUED | OUTPATIENT
Start: 2018-07-11 | End: 2018-07-11 | Stop reason: HOSPADM

## 2018-07-11 RX ORDER — LIDOCAINE HYDROCHLORIDE 10 MG/ML
0.1 INJECTION, SOLUTION EPIDURAL; INFILTRATION; INTRACAUDAL; PERINEURAL AS NEEDED
Status: DISCONTINUED | OUTPATIENT
Start: 2018-07-11 | End: 2018-07-11 | Stop reason: HOSPADM

## 2018-07-11 RX ORDER — FACIAL-BODY WIPES
10 EACH TOPICAL DAILY PRN
Status: DISCONTINUED | OUTPATIENT
Start: 2018-07-13 | End: 2018-07-18 | Stop reason: HOSPADM

## 2018-07-11 RX ORDER — ACETAMINOPHEN 325 MG/1
650 TABLET ORAL
Status: DISCONTINUED | OUTPATIENT
Start: 2018-07-11 | End: 2018-07-18 | Stop reason: HOSPADM

## 2018-07-11 RX ORDER — FENTANYL CITRATE 50 UG/ML
25 INJECTION, SOLUTION INTRAMUSCULAR; INTRAVENOUS
Status: DISCONTINUED | OUTPATIENT
Start: 2018-07-11 | End: 2018-07-11

## 2018-07-11 RX ORDER — PROPOFOL 10 MG/ML
INJECTION, EMULSION INTRAVENOUS
Status: DISCONTINUED | OUTPATIENT
Start: 2018-07-11 | End: 2018-07-11 | Stop reason: HOSPADM

## 2018-07-11 RX ORDER — OXYCODONE HYDROCHLORIDE 5 MG/1
5 TABLET ORAL
Status: DISCONTINUED | OUTPATIENT
Start: 2018-07-11 | End: 2018-07-11

## 2018-07-11 RX ORDER — SODIUM CHLORIDE 9 MG/ML
125 INJECTION, SOLUTION INTRAVENOUS CONTINUOUS
Status: DISPENSED | OUTPATIENT
Start: 2018-07-11 | End: 2018-07-12

## 2018-07-11 RX ORDER — NALOXONE HYDROCHLORIDE 0.4 MG/ML
0.4 INJECTION, SOLUTION INTRAMUSCULAR; INTRAVENOUS; SUBCUTANEOUS AS NEEDED
Status: DISCONTINUED | OUTPATIENT
Start: 2018-07-11 | End: 2018-07-18 | Stop reason: HOSPADM

## 2018-07-11 RX ORDER — SODIUM CHLORIDE 9 MG/ML
25 INJECTION, SOLUTION INTRAVENOUS CONTINUOUS
Status: DISCONTINUED | OUTPATIENT
Start: 2018-07-11 | End: 2018-07-11 | Stop reason: HOSPADM

## 2018-07-11 RX ORDER — MORPHINE SULFATE 10 MG/ML
2 INJECTION, SOLUTION INTRAMUSCULAR; INTRAVENOUS
Status: DISCONTINUED | OUTPATIENT
Start: 2018-07-11 | End: 2018-07-11 | Stop reason: HOSPADM

## 2018-07-11 RX ORDER — FAMOTIDINE 20 MG/1
20 TABLET, FILM COATED ORAL
Status: DISCONTINUED | OUTPATIENT
Start: 2018-07-11 | End: 2018-07-11 | Stop reason: DRUGHIGH

## 2018-07-11 RX ORDER — SODIUM CHLORIDE 0.9 % (FLUSH) 0.9 %
5-10 SYRINGE (ML) INJECTION AS NEEDED
Status: DISCONTINUED | OUTPATIENT
Start: 2018-07-11 | End: 2018-07-18 | Stop reason: HOSPADM

## 2018-07-11 RX ORDER — PROPOFOL 10 MG/ML
INJECTION, EMULSION INTRAVENOUS AS NEEDED
Status: DISCONTINUED | OUTPATIENT
Start: 2018-07-11 | End: 2018-07-11 | Stop reason: HOSPADM

## 2018-07-11 RX ORDER — AMLODIPINE BESYLATE 5 MG/1
5 TABLET ORAL DAILY
Status: DISCONTINUED | OUTPATIENT
Start: 2018-07-12 | End: 2018-07-18 | Stop reason: HOSPADM

## 2018-07-11 RX ORDER — SODIUM CHLORIDE, SODIUM LACTATE, POTASSIUM CHLORIDE, CALCIUM CHLORIDE 600; 310; 30; 20 MG/100ML; MG/100ML; MG/100ML; MG/100ML
100 INJECTION, SOLUTION INTRAVENOUS CONTINUOUS
Status: DISCONTINUED | OUTPATIENT
Start: 2018-07-11 | End: 2018-07-11 | Stop reason: HOSPADM

## 2018-07-11 RX ORDER — ACETAMINOPHEN 325 MG/1
650 TABLET ORAL EVERY 6 HOURS
Status: DISCONTINUED | OUTPATIENT
Start: 2018-07-11 | End: 2018-07-18 | Stop reason: HOSPADM

## 2018-07-11 RX ORDER — FAMOTIDINE 20 MG/1
20 TABLET, FILM COATED ORAL
Status: DISCONTINUED | OUTPATIENT
Start: 2018-07-11 | End: 2018-07-14

## 2018-07-11 RX ORDER — SODIUM CHLORIDE 0.9 % (FLUSH) 0.9 %
5-10 SYRINGE (ML) INJECTION EVERY 8 HOURS
Status: DISCONTINUED | OUTPATIENT
Start: 2018-07-11 | End: 2018-07-11 | Stop reason: HOSPADM

## 2018-07-11 RX ORDER — LEVOTHYROXINE SODIUM 25 UG/1
25 TABLET ORAL
Status: DISCONTINUED | OUTPATIENT
Start: 2018-07-12 | End: 2018-07-18 | Stop reason: HOSPADM

## 2018-07-11 RX ORDER — SODIUM CHLORIDE 0.9 % (FLUSH) 0.9 %
5-10 SYRINGE (ML) INJECTION AS NEEDED
Status: DISCONTINUED | OUTPATIENT
Start: 2018-07-11 | End: 2018-07-11 | Stop reason: HOSPADM

## 2018-07-11 RX ORDER — OXYCODONE HYDROCHLORIDE 5 MG/1
5 TABLET ORAL
Status: DISCONTINUED | OUTPATIENT
Start: 2018-07-11 | End: 2018-07-18 | Stop reason: HOSPADM

## 2018-07-11 RX ORDER — ALBUMIN HUMAN 50 G/1000ML
SOLUTION INTRAVENOUS AS NEEDED
Status: DISCONTINUED | OUTPATIENT
Start: 2018-07-11 | End: 2018-07-11 | Stop reason: HOSPADM

## 2018-07-11 RX ORDER — SODIUM CHLORIDE 0.9 % (FLUSH) 0.9 %
5-10 SYRINGE (ML) INJECTION EVERY 8 HOURS
Status: DISCONTINUED | OUTPATIENT
Start: 2018-07-12 | End: 2018-07-18 | Stop reason: HOSPADM

## 2018-07-11 RX ORDER — BUPIVACAINE HYDROCHLORIDE 5 MG/ML
INJECTION, SOLUTION EPIDURAL; INTRACAUDAL AS NEEDED
Status: DISCONTINUED | OUTPATIENT
Start: 2018-07-11 | End: 2018-07-11 | Stop reason: HOSPADM

## 2018-07-11 RX ORDER — CEFAZOLIN SODIUM/WATER 2 G/20 ML
2 SYRINGE (ML) INTRAVENOUS EVERY 8 HOURS
Status: DISCONTINUED | OUTPATIENT
Start: 2018-07-11 | End: 2018-07-11 | Stop reason: HOSPADM

## 2018-07-11 RX ORDER — ONDANSETRON 2 MG/ML
4 INJECTION INTRAMUSCULAR; INTRAVENOUS AS NEEDED
Status: DISCONTINUED | OUTPATIENT
Start: 2018-07-11 | End: 2018-07-11 | Stop reason: HOSPADM

## 2018-07-11 RX ORDER — ROPIVACAINE HYDROCHLORIDE 5 MG/ML
150 INJECTION, SOLUTION EPIDURAL; INFILTRATION; PERINEURAL AS NEEDED
Status: DISCONTINUED | OUTPATIENT
Start: 2018-07-11 | End: 2018-07-11 | Stop reason: HOSPADM

## 2018-07-11 RX ORDER — AMOXICILLIN 250 MG
1 CAPSULE ORAL 2 TIMES DAILY
Status: DISCONTINUED | OUTPATIENT
Start: 2018-07-11 | End: 2018-07-18 | Stop reason: HOSPADM

## 2018-07-11 RX ORDER — FENTANYL CITRATE 50 UG/ML
25 INJECTION, SOLUTION INTRAMUSCULAR; INTRAVENOUS
Status: DISCONTINUED | OUTPATIENT
Start: 2018-07-11 | End: 2018-07-11 | Stop reason: HOSPADM

## 2018-07-11 RX ORDER — CELECOXIB 200 MG/1
200 CAPSULE ORAL ONCE
Status: COMPLETED | OUTPATIENT
Start: 2018-07-11 | End: 2018-07-11

## 2018-07-11 RX ORDER — OXYCODONE HYDROCHLORIDE 5 MG/1
5 TABLET ORAL AS NEEDED
Status: DISCONTINUED | OUTPATIENT
Start: 2018-07-11 | End: 2018-07-11 | Stop reason: HOSPADM

## 2018-07-11 RX ORDER — POLYETHYLENE GLYCOL 3350 17 G/17G
17 POWDER, FOR SOLUTION ORAL DAILY
Status: DISCONTINUED | OUTPATIENT
Start: 2018-07-12 | End: 2018-07-18 | Stop reason: HOSPADM

## 2018-07-11 RX ORDER — MIDAZOLAM HYDROCHLORIDE 1 MG/ML
INJECTION, SOLUTION INTRAMUSCULAR; INTRAVENOUS AS NEEDED
Status: DISCONTINUED | OUTPATIENT
Start: 2018-07-11 | End: 2018-07-11 | Stop reason: HOSPADM

## 2018-07-11 RX ORDER — SIMVASTATIN 40 MG/1
40 TABLET, FILM COATED ORAL
Status: DISCONTINUED | OUTPATIENT
Start: 2018-07-12 | End: 2018-07-18 | Stop reason: HOSPADM

## 2018-07-11 RX ORDER — OXYCODONE HYDROCHLORIDE 5 MG/1
10 TABLET ORAL
Status: DISCONTINUED | OUTPATIENT
Start: 2018-07-11 | End: 2018-07-11

## 2018-07-11 RX ORDER — SODIUM CHLORIDE, SODIUM LACTATE, POTASSIUM CHLORIDE, CALCIUM CHLORIDE 600; 310; 30; 20 MG/100ML; MG/100ML; MG/100ML; MG/100ML
1000 INJECTION, SOLUTION INTRAVENOUS CONTINUOUS
Status: DISCONTINUED | OUTPATIENT
Start: 2018-07-11 | End: 2018-07-11 | Stop reason: HOSPADM

## 2018-07-11 RX ORDER — MIDAZOLAM HYDROCHLORIDE 1 MG/ML
0.5 INJECTION, SOLUTION INTRAMUSCULAR; INTRAVENOUS
Status: DISCONTINUED | OUTPATIENT
Start: 2018-07-11 | End: 2018-07-11 | Stop reason: HOSPADM

## 2018-07-11 RX ORDER — ONDANSETRON 2 MG/ML
4 INJECTION INTRAMUSCULAR; INTRAVENOUS
Status: ACTIVE | OUTPATIENT
Start: 2018-07-11 | End: 2018-07-12

## 2018-07-11 RX ORDER — SODIUM CHLORIDE, SODIUM LACTATE, POTASSIUM CHLORIDE, CALCIUM CHLORIDE 600; 310; 30; 20 MG/100ML; MG/100ML; MG/100ML; MG/100ML
INJECTION, SOLUTION INTRAVENOUS
Status: DISCONTINUED | OUTPATIENT
Start: 2018-07-11 | End: 2018-07-11

## 2018-07-11 RX ADMIN — MIDAZOLAM HYDROCHLORIDE 0.5 MG: 1 INJECTION, SOLUTION INTRAMUSCULAR; INTRAVENOUS at 11:21

## 2018-07-11 RX ADMIN — Medication 80 MCG: at 11:36

## 2018-07-11 RX ADMIN — SODIUM CHLORIDE, SODIUM LACTATE, POTASSIUM CHLORIDE, AND CALCIUM CHLORIDE 1000 ML: 600; 310; 30; 20 INJECTION, SOLUTION INTRAVENOUS at 10:21

## 2018-07-11 RX ADMIN — ALBUMIN HUMAN 250 ML: 50 SOLUTION INTRAVENOUS at 11:38

## 2018-07-11 RX ADMIN — SODIUM CHLORIDE 125 ML/HR: 900 INJECTION, SOLUTION INTRAVENOUS at 15:35

## 2018-07-11 RX ADMIN — SODIUM CHLORIDE: 900 INJECTION, SOLUTION INTRAVENOUS at 12:45

## 2018-07-11 RX ADMIN — PROPOFOL 50 MG: 10 INJECTION, EMULSION INTRAVENOUS at 11:25

## 2018-07-11 RX ADMIN — ACETAMINOPHEN 650 MG: 325 TABLET ORAL at 18:17

## 2018-07-11 RX ADMIN — RIVAROXABAN 10 MG: 10 TABLET, FILM COATED ORAL at 22:48

## 2018-07-11 RX ADMIN — Medication 80 MCG: at 11:39

## 2018-07-11 RX ADMIN — SODIUM CHLORIDE 125 ML/HR: 900 INJECTION, SOLUTION INTRAVENOUS at 22:51

## 2018-07-11 RX ADMIN — STANDARDIZED SENNA CONCENTRATE AND DOCUSATE SODIUM 1 TABLET: 8.6; 5 TABLET, FILM COATED ORAL at 18:16

## 2018-07-11 RX ADMIN — Medication 80 MCG: at 11:45

## 2018-07-11 RX ADMIN — BUPIVACAINE HYDROCHLORIDE 10 MG: 5 INJECTION, SOLUTION EPIDURAL; INTRACAUDAL at 11:30

## 2018-07-11 RX ADMIN — SODIUM CHLORIDE 125 ML/HR: 900 INJECTION, SOLUTION INTRAVENOUS at 19:11

## 2018-07-11 RX ADMIN — ACETAMINOPHEN 650 MG: 325 TABLET ORAL at 22:48

## 2018-07-11 RX ADMIN — MIDAZOLAM HYDROCHLORIDE 0.5 MG: 1 INJECTION, SOLUTION INTRAMUSCULAR; INTRAVENOUS at 11:08

## 2018-07-11 RX ADMIN — PROPOFOL 60 MCG/KG/MIN: 10 INJECTION, EMULSION INTRAVENOUS at 11:31

## 2018-07-11 RX ADMIN — Medication 80 MCG: at 11:33

## 2018-07-11 RX ADMIN — Medication 80 MCG: at 11:56

## 2018-07-11 RX ADMIN — Medication 80 MCG: at 12:06

## 2018-07-11 RX ADMIN — Medication 80 MCG: at 11:52

## 2018-07-11 RX ADMIN — Medication 80 MCG: at 13:10

## 2018-07-11 RX ADMIN — PROPOFOL 20 MG: 10 INJECTION, EMULSION INTRAVENOUS at 11:34

## 2018-07-11 RX ADMIN — PROPOFOL 20 MG: 10 INJECTION, EMULSION INTRAVENOUS at 11:38

## 2018-07-11 RX ADMIN — Medication 80 MCG: at 14:05

## 2018-07-11 RX ADMIN — Medication 2 G: at 19:13

## 2018-07-11 RX ADMIN — SODIUM CHLORIDE 500 ML: 900 INJECTION, SOLUTION INTRAVENOUS at 17:35

## 2018-07-11 RX ADMIN — Medication 2 G: at 11:38

## 2018-07-11 RX ADMIN — Medication 80 MCG: at 11:59

## 2018-07-11 RX ADMIN — Medication 80 MCG: at 11:49

## 2018-07-11 RX ADMIN — Medication 80 MCG: at 12:02

## 2018-07-11 RX ADMIN — CELECOXIB 200 MG: 200 CAPSULE ORAL at 10:09

## 2018-07-11 RX ADMIN — OXYCODONE HYDROCHLORIDE 5 MG: 5 TABLET ORAL at 21:27

## 2018-07-11 NOTE — ANESTHESIA PROCEDURE NOTES
Spinal Block    Start time: 7/11/2018 11:24 AM  End time: 7/11/2018 11:34 AM  Performed by: Uday Bailey  Authorized by: Uday Bailey     Pre-procedure:   Indications: primary anesthetic  Preanesthetic Checklist: patient identified, risks and benefits discussed, anesthesia consent, site marked, patient being monitored and timeout performed    Timeout Time: 11:24          Spinal Block:   Patient Position:  Seated  Prep Region:  Lumbar  Prep: Betadine and patient draped      Location:  L3-4  Technique:  Single shot  Local:  Lidocaine 1%      Needle:   Needle Type:  Pencil-tip  Needle Gauge:  24 G  Attempts:  2      Events: CSF confirmed, no blood with aspiration and no paresthesia        Assessment:  Insertion:  Uncomplicated  Patient tolerance:  Patient tolerated the procedure well with no immediate complications

## 2018-07-11 NOTE — ROUTINE PROCESS
Patient: Pancho Motley MRN: [id number]  SSN: [id number]   YOB: 1940  Age: 66 y.o. Sex: female     Patient is status post Procedure(s):  LEFT TOTAL HIP REPLACEMENT. Surgeon(s) and Role:     * Camelia Alvarez MD - Primary    Local/Dose/Irrigation:  Left hip in jection: 0.5% Ropivicaine 50 ml, Clonidine 0.8 ml, Epinephrine 0.5 ml, Ketorolac 1 ml in 48.45  Ml injectable saline              Peripheral IV 07/11/18 Left Arm (Active)   Site Assessment Clean, dry, & intact 7/11/2018 10:20 AM   Phlebitis Assessment 0 7/11/2018 10:20 AM   Infiltration Assessment 0 7/11/2018 10:20 AM   Dressing Status Clean, dry, & intact; New 7/11/2018 10:20 AM   Dressing Type Tape;Transparent 7/11/2018 10:20 AM   Hub Color/Line Status Green 7/11/2018 10:20 AM                           Dressing/Packing:  Wound Hip Left-DRESSING TYPE: Aquacel;Staples (07/11/18 1100)  Splint/Cast:  ]    Other:

## 2018-07-11 NOTE — ANESTHESIA PREPROCEDURE EVALUATION
Anesthetic History   No history of anesthetic complications            Review of Systems / Medical History  Patient summary reviewed, nursing notes reviewed and pertinent labs reviewed    Pulmonary    COPD      Smoker         Neuro/Psych   Within defined limits           Cardiovascular    Hypertension                   GI/Hepatic/Renal  Within defined limits              Endo/Other    Diabetes  Hypothyroidism  Arthritis     Other Findings            Physical Exam    Airway  Mallampati: II  TM Distance: > 6 cm  Neck ROM: normal range of motion   Mouth opening: Normal     Cardiovascular  Regular rate and rhythm,  S1 and S2 normal,  no murmur, click, rub, or gallop             Dental  No notable dental hx       Pulmonary  Breath sounds clear to auscultation               Abdominal  GI exam deferred       Other Findings            Anesthetic Plan    ASA: 3  Anesthesia type: spinal            Anesthetic plan and risks discussed with: Patient

## 2018-07-11 NOTE — PERIOP NOTES
TRANSFER - OUT REPORT:    Verbal report given to Aquiles Rosario (name) on Kalpana Burrell  being transferred to   (unit) for routine post - op       Report consisted of patients Situation, Background, Assessment and   Recommendations(SBAR). Time Pre op antibiotic given:1138  Anesthesia Stop time: 3650  Germain Present on Transfer to floor:y  Order for Germain on Chart:y  Discharge Prescriptions with Chart:n    Information from the following report(s) SBAR, OR Summary, Intake/Output, MAR, Accordion and Recent Results was reviewed with the receiving nurse. Opportunity for questions and clarification was provided. Is the patient on 02? YES       L/Min 2       Other     Is the patient on a monitor? NO    Is the nurse transporting with the patient? NO    Surgical Waiting Area notified of patient's transfer from PACU? YES      The following personal items collected during your admission accompanied patient upon transfer:   Dental Appliance: Dental Appliances: Uppers (UPPER TO PACU)  Vision: Visual Aid: Glasses  Hearing Aid:    Jewelry: Jewelry: None  Clothing: Clothing: Other (comment) (CLOTHING & SHOES TO PACU)  Other Valuables:  Other Valuables: Eyeglasses (GLASSES TO PACU)  Valuables sent to safe:      Clothes glasses dentures cane

## 2018-07-11 NOTE — PROGRESS NOTES
Bedside and Verbal shift change report given to [facility] (oncoming nurse) by Keren Stone (offgoing nurse). Report included the following information SBAR.

## 2018-07-11 NOTE — ANESTHESIA POSTPROCEDURE EVALUATION
Post-Anesthesia Evaluation and Assessment    Patient: Eloisa Manrique MRN: [id number]  SSN: [id number]    YOB: 1940  Age: 66 y.o. Sex: female       Cardiovascular Function/Vital Signs  Visit Vitals    /48    Pulse (!) 48    Temp 36.4 °C (97.6 °F)    Resp 17    Ht 5' (1.524 m)    Wt 58.1 kg (128 lb)    SpO2 100%    BMI 25 kg/m2       Patient is status post spinal anesthesia for Procedure(s):  LEFT TOTAL HIP REPLACEMENT. Nausea/Vomiting: None    Postoperative hydration reviewed and adequate. Pain:  Pain Scale 1: Numeric (0 - 10) (07/11/18 0949)  Pain Intensity 1: 9 (07/11/18 0949)   Managed    Neurological Status:   Neuro (WDL): Within Defined Limits (07/11/18 1001)   At baseline    Mental Status and Level of Consciousness: Arousable    Pulmonary Status:   O2 Device: Nasal cannula (07/11/18 1417)   Adequate oxygenation and airway patent    Complications related to anesthesia: None    Post-anesthesia assessment completed.  No concerns    Signed By: Ankita Portillo MD     July 11, 2018

## 2018-07-11 NOTE — PROGRESS NOTES
TRANSFER - IN REPORT:    Verbal report received from Ankit Rendon RN(name) on Letty Singh  being received from [employer]) for routine post - op      Report consisted of patients Situation, Background, Assessment and   Recommendations(SBAR). Information from the following report(s) SBAR was reviewed with the receiving nurse. Opportunity for questions and clarification was provided. Assessment completed upon patients arrival to unit and care assumed.

## 2018-07-11 NOTE — OP NOTES
[address] JOHANNA Gupta  MR#: [id number]  : 1940  ACCOUNT #: [de-identified]   DATE OF SERVICE: 2018    PREOPERATIVE DIAGNOSIS:  Advanced degenerative arthritis, bilateral hips. POSTOPERATIVE DIAGNOSIS:  Advanced degenerative arthritis, bilateral hips. PROCEDURE PERFORMED:  Left total hip replacement. ANESTHESIA:  Spinal.    ESTIMATED BLOOD LOSS:  200 mL. DRAIN:  None. COMPLICATIONS:  None. SPECIMENS REMOVED:  Left femoral head. SURGEON:  Shanna Goldberg, MD     ASSISTANT:  Donavon Moya PA-C    IMPLANTS:  Left total hip components as listed in operative report. COMPLICATIONS:  None. INDICATIONS:  The patient has advanced degenerative arthritis of both hips. She has severe pain. She has severe pain with walking, any attempts to rotate her left hip is painful and she has very little rotation at all of the left hip. She now presents for the above procedure. PROCEDURE:  On date of operation, the patient was taken to the operating room, spinal anesthesia administered. Patient was placed in the supine position and then turned to the right lateral decubitus position. The left lower extremity was prepped and draped in the usual fashion. A mini posterolateral incision was made over the hip. It was carried through subcutaneous tissue. The tensor fascia neto was sharply divided, fascia of the gluteal muscle was divided in line with their fibers. The Charnley retractors were carefully placed. The hip was noted to have essentially no rotation. The external rotator muscles were taken down. A T-shaped posterior capsular incision made. Osteotomes were then used to remove osteophytes posteriorly in order to dislocate the hip. The hip was then dislocated. Oscillating saw was used to make the femoral neck osteotomy. Retractors were carefully placed around the acetabulum.   A large amount of osteophyte formation was encountered and removed throughout the acetabulum. Acetabulum was then reamed to 51 mm and felt to have good coverage and good bone quality at this point. A 52 mm porous coated Gription technology acetabular component was press-fit into place and felt to have a tight fit, 1 superior screw placed, 36 mm polyethylene trial liner placed. Attention was then turned to the femur. Box osteotome was utilized. Femur was reamed to a #4 in the Sherman system. It was broached to a #4 in the Sherman system. X-ray indicated that one size larger femur would be appropriate. Therefore, the femur was reamed and broached to a #5 and felt to have a tight fit. The various head and neck trials were utilized. The hip was felt to be quite tight and it was felt to have excellent stability and leg lengths with the -2 trial head. The trial components were then removed. The 36 mm polyethylene liner was placed in the acetabulum. Attention was then turned to the femur. The femur was thoroughly irrigated with pulse irrigation as well as antibiotic solution. The #5 Sherman porous coated femoral stem was press-fit into place and felt to have a tight fit. The -2 x 36 head was introduced and reduced, it was felt to be stable. Incisions were thoroughly irrigated, coagulation achieved with electrocautery. The fascia closed with #1 Vicryl sutures supplemented with #2 PDS, 2-0 Vicryl was used to close subcutaneous tissue and staples used to close the skin. Sterile dressings were applied and the patient taken to recovery room in satisfactory condition. The assistant, Sapphire Gray PA-C, assisted me with positioning, retraction, implant installation, and incision closure. MD ELDER Pino / [place]  D: 07/11/2018 16:37     T: 07/11/2018 18:39  JOB #: [id number]  CC: Julee Hidalgo MD  CC: Governor Sheng GREEN

## 2018-07-11 NOTE — PROGRESS NOTES
NP ORTHO PROGRESS NOTE  Post Op day: Day of Surgery    Hayden Santiago  [id number]  female  66 y.o.   1940    Admit date: 7/11/2018  Date of Surgery: 7/11/2018   Procedures: Procedure(s):  LEFT TOTAL HIP REPLACEMENT  Admitting Physician: Josemanuel Elliott MD   Surgeon: Ashlyn Hernandez) and Role:     Benito Freed MD - Primary    Chart/Meds/Labs Reviewed  Current Facility-Administered Medications   Medication Dose Route Frequency    0.9% sodium chloride infusion  125 mL/hr IntraVENous CONTINUOUS    [START ON 7/12/2018] sodium chloride (NS) flush 5-10 mL  5-10 mL IntraVENous Q8H    sodium chloride (NS) flush 5-10 mL  5-10 mL IntraVENous PRN    acetaminophen (TYLENOL) tablet 650 mg  650 mg Oral Q6H    acetaminophen (TYLENOL) tablet 650 mg  650 mg Oral Q6H PRN    naloxone (NARCAN) injection 0.4 mg  0.4 mg IntraVENous PRN    ceFAZolin (ANCEF) 2 g/20 mL in sterile water IV syringe  2 g IntraVENous Q8H    ondansetron (ZOFRAN) injection 4 mg  4 mg IntraVENous Q4H PRN    hydrOXYzine HCl (ATARAX) tablet 10 mg  10 mg Oral Q8H PRN    senna-docusate (PERICOLACE) 8.6-50 mg per tablet 1 Tab  1 Tab Oral BID    [START ON 7/12/2018] polyethylene glycol (MIRALAX) packet 17 g  17 g Oral DAILY    [START ON 7/13/2018] bisacodyl (DULCOLAX) suppository 10 mg  10 mg Rectal DAILY PRN    rivaroxaban (XARELTO) tablet 10 mg  10 mg Oral DAILY WITH LUNCH    [START ON 7/12/2018] amLODIPine (NORVASC) tablet 5 mg  5 mg Oral DAILY    [START ON 7/12/2018] levothyroxine (SYNTHROID) tablet 25 mcg  25 mcg Oral ACB    [START ON 7/12/2018] simvastatin (ZOCOR) tablet 40 mg  40 mg Oral QHS    famotidine (PEPCID) tablet 20 mg  20 mg Oral DAILY PRN    oxyCODONE IR (ROXICODONE) tablet 5 mg  5 mg Oral Q3H PRN    oxyCODONE IR (ROXICODONE) tablet 2.5 mg  2.5 mg Oral Q3H PRN       Subjective: In to see patient just as finishing ambulating with PT and lying flat in bed. BP dropping (see VS record below)& dizzy.   500 ml bolus started, while PT went to notify RN. Gradually over 15 minutes raised HOB and denies Dizziness, CP, Palpitations, SOB, N/V, Abdominal pain, numbness or tingling of extremities throughout time with her. Able to perform ankle pumps easily. Incisional pain control: well controlled. None. Denies prior use of any opioids in last 2 months. Was taking Celebrex per her PCP's recommendation until last few days PTA. Feeling hungry & ready to take some solid food. Objective:  General: Alert,Ox4, cooperative, NAD  HEENT: Atraumatic, PERRL, anicteric sclerae  Lungs: Bilateral expansion. CTA, Equal excursion. No accessory muscle use. Gastrointestinal:  Soft, non-tender, non-distended  Extremities:  Neurovasc exam WDL. + DP pulses. Sensation intact to light touch. Motor: + DF/PF          Calves non-tender upon palpation or with passive stretch. No significant erythema or swelling. Dressing: clean, dry and intact.   NS via IV peripherally  Germain catheter with clear juancarlos urine~150 ml in bag  Vital Signs:   Visit Vitals    /53    Pulse 61    Temp 97.6 °F (36.4 °C)    Resp 16    Ht 5' (1.524 m)    Wt 58.1 kg (128 lb)    SpO2 100%    BMI 25 kg/m2    O2 Flow Rate (L/min): 2 l/min O2 Device: Room air   Patient Vitals for the past 24 hrs:   BP Temp Pulse Resp SpO2 Height Weight   07/11/18 1815 100/53 97.6 °F (36.4 °C) 61 16 100 % - -   07/11/18 1740 (!) 87/52 - - - - - -   07/11/18 1733 (!) 63/40 - - - - - -   07/11/18 1720 119/61 - - - - - -   07/11/18 1659 112/65 - (!) 57 16 98 % - -   07/11/18 1630 - - (!) 50 16 99 % - -   07/11/18 1615 125/57 - (!) 56 23 99 % - -   07/11/18 1600 114/55 - (!) 52 18 91 % - -   07/11/18 1545 118/52 - (!) 57 18 100 % - -   07/11/18 1530 108/51 - (!) 54 17 100 % - -   07/11/18 1515 115/55 - (!) 53 16 100 % - -   07/11/18 1500 106/53 - (!) 53 17 100 % - -   07/11/18 1445 113/57 - (!) 53 16 100 % - -   07/11/18 1430 104/53 - (!) 59 16 98 % - -   07/11/18 1425 105/48 - (!) 48 14 100 % - -   18 1420 116/51 - (!) 48 18 100 % - -   18 1417 106/48 97.6 °F (36.4 °C) (!) 48 17 100 % - -   18 1415 (!) 81/43 - (!) 53 12 100 % - -   18 0949 142/80 98.4 °F (36.9 °C) 72 16 97 % 5' (1.524 m) 58.1 kg (128 lb)     Temp (24hrs), Av.9 °F (36.6 °C), Min:97.6 °F (36.4 °C), Max:98.4 °F (36.9 °C)      Intake/output-last 8 hours:    07 -  1900  In: 1900 [I.V.:1900]  Out: 600 [Urine:400]    Intake/output- 24 hours:       LAB:   Recent Results (from the past 24 hour(s))   GLUCOSE, POC    Collection Time: 18 10:23 AM   Result Value Ref Range    Glucose (POC) 86 65 - 100 mg/dL    Performed by Mario Wells       Lab Results   Component Value Date/Time    INR 1.0 2018 11:59 AM     Lab Results   Component Value Date/Time    HGB 12.1 2018 11:59 AM    HGB 12.1 2017 11:37 AM    HGB 12.3 12/15/2016 12:43 PM    HGB 12.7 10/16/2015 12:34 PM     No results for input(s): NA, K, CL, BUN, CREA, GLU, CA, MG, PHOS, ALB, TBIL, TBILI, SGOT in the last 72 hours. No lab exists for component: ALT;3    PT/OT:   Gait:  Gait  Speed/Love: Pace decreased (<100 feet/min)  Step Length: Right shortened, Left shortened  Stance: Left decreased  Gait Abnormalities: Antalgic, Decreased step clearance  Ambulation - Level of Assistance: Contact guard assistance  Distance (ft): 15 Feet (ft)  Assistive Device: Gait belt, Walker, rolling                 PATIENT MOBILITY  Bed Mobility Training  Supine to Sit: Additional time, Stand-by assistance  Sit to Supine:  Moderate assistance  Scooting: Modified independent  Transfer Training  Sit to Stand: Contact guard assistance, Additional time  Stand to Sit: Contact guard assistance      Gait Training  Assistive Device: Gait belt, Walker, rolling  Ambulation - Level of Assistance: Contact guard assistance  Distance (ft): 15 Feet (ft)   Weight Bearing Status  Left Side Weight Bearing: Partial (%) (50)        Assessment and Plan Principal Problem:    Primary osteoarthritis of left hip (2018)          POD#0 Procedure(s):  LEFT TOTAL HIP REPLACEMENT  Symptomatic with hypotension after ambulation. No indications of bleeding or other issues but suspect hypovolemic. Keep hydrated. Monitor Hemodynamics & full assessment per RNs carefully overnight. VTE prophylaxis: Xarelto to start tonight, Mobilization, Ankle pumping exercises, SCDs per order if not able to exercise or mobilize well. Weight bearin % of operative leg  Pain management:  Stay away from NSAIDs for now. Estimated Creatinine clearance of ~44. & U/A + protein PTA. Multi-modal pain plan, see above for medication,  Ice packs & elevation of extremity per orders, active gentle ROM & mobilize frequently for short periods of time. PT/OT  Wound benign. Neurovascularly intact. Continue present plans per Dr. Sreenity Martin .     Signed By: Lyn Rodas NP    RN, MSN, MA, Adult NP-BC

## 2018-07-11 NOTE — PROGRESS NOTES
Famotidine - Renal dosing by Pharmacy  Current regimen:  20 mg PO Q 12 hr PRN  No results for input(s): CREA, BUN in the last 72 hours.   SCr Trend:  Recent Labs      07/05/18   1159  11/17/17   1137   CREA  0.83  0.88   BUN  26*  22   Estimated CrCl:  44 ml/min    Plan:  Change to 20 mg PO Q 24hrs PRN for CrCl <50 mL/min    Famotidine (PO/IV) Renal Dosing:      >50 mL/min - 20 mg Q 12hrs      <50 mL/min - 20 mg Q 24hrs                    HD - 20 mg Q 24hrs               CRRT - n/a

## 2018-07-11 NOTE — BRIEF OP NOTE
BRIEF OPERATIVE NOTE    Date of Procedure: 7/11/2018   Preoperative Diagnosis: DEGENERATIVE JOINT DISEASE LEFT HIP  Postoperative Diagnosis: DEGENERATIVE JOINT DISEASE LEFT HIP    Procedure(s):  LEFT TOTAL HIP REPLACEMENT  Surgeon(s) and Role:     * Butch Brooks MD - Primary         Surgical Assistant: Mary Springer PA-C    Surgical Staff:  Circ-1: Perico Hutchins RN  Circ-Relief: Cecilia Keating  Physician Assistant: Mary Springer PA-C  Scrub Tech-1: Reagan Garcia  Scrub RN-1: Melva Miller RN  Surg Asst-1: Reema Roche; Mikayla Baptise  Event Time In   Incision Start 1202   Incision Close 1357     Anesthesia: Spinal   Estimated Blood Loss: 200 mL  Specimens: * No specimens in log *   Findings: DJD Left hip   Complications: None. Implants:   Implant Name Type Inv.  Item Serial No.  Lot No. [id number] No. Used Action   CUP ACET SECTOR GRIPTION 52MM -- TI - SNA  CUP ACET SECTOR GRIPTION 52MM -- TI NA [facility] [id number] Left 1 Implanted   SCR ACET CANC PINN 6.5X20MM SS --  - SNA  SCR ACET CANC PINN 6.5X20MM SS --  NA [facility] [id number] Left 1 Implanted   STEM FEM 12/14 TAPR STD SZ 5 -- [place] - SNA  STEM FEM 12/14 TAPR STD SZ 5 -- [place] NA [facility] [id number] Left 1 Implanted   LINER ACET PINN NEUT [id number] -- ALTRX - SNA  LINER ACET PINN NEUT [id number] -- ALTRX NA [facility] [id number] Left 1 Implanted   HEAD FEM 36MM 2MM NK --  - SNA   HEAD FEM 36MM 2MM NK --  NA [facility] [id number] Left 1 Implanted

## 2018-07-11 NOTE — PROGRESS NOTES
Problem: Falls - Risk of  Goal: *Absence of Falls  Document Alberto Fall Risk and appropriate interventions in the flowsheet.    Outcome: Progressing Towards Goal  Fall Risk Interventions:  Mobility Interventions: PT Consult for assist device competence, PT Consult for mobility concerns, Patient to call before getting OOB         Medication Interventions: Patient to call before getting OOB    Elimination Interventions: Call light in reach, Patient to call for help with toileting needs

## 2018-07-11 NOTE — PROGRESS NOTES
Problem: Mobility Impaired (Adult and Pediatric)  Goal: *Acute Goals and Plan of Care (Insert Text)  Physical Therapy Goals  Initiated 7/11/2018    1. Patient will move from supine to sit and sit to supine  in bed with modified independence within 4 days. 2. Patient will perform sit to stand with modified independence within 4 days. 3. Patient will ambulate with supervision/set-up for 200 feet with the least restrictive device within 4 days. 4. Patient will ascend/descend 3 stairs with  handrail(s) with supervision/set-up within 4 days. 5. Patient will perform  home exercise program per protocol with supervision/set-up within 4 days. physical Therapy EVALUATION  Patient: Hayden Santiago (59 y.o. female)  Date: 7/11/2018  Primary Diagnosis: DEGENERATIVE JOINT DISEASE LEFT HIP  Primary osteoarthritis of left hip  Procedure(s) (LRB):  LEFT TOTAL HIP REPLACEMENT (Left) Day of Surgery   Precautions: 50% weightbearing LLE       ASSESSMENT :  Based on the objective data described below, the patient presents with overall decreased mobility following THR today. Reviewed exercises of ankle pumps, quad, ham and glute isometrics. Initially moving well and managing well with supine to sit. Vitals taken and stable between transition and no reports of dizziness. Educated on weightbearing precautions. Ambulated with RW with slow and steady gait. Once got to the door reported feeling dizzy and chair was brought up to patient. Blood pressure significantly low and tilted patient back and dragged chair to bed. Patient able to assist back to bed with stand pivot transfers and assisted her to supine. Monitored blood pressure and pressure back up to 87/52 with NP adjusting fluid flow. Nursing notified of events . Patient remained alert and oriented throughout session and stated feeling much better after lying back down. Patient with good support of family for discharge and anticipate steady progress.     Patient will benefit from skilled intervention to address the above impairments. Patients rehabilitation potential is considered to be Good  Factors which may influence rehabilitation potential include:   []         None noted  []         Mental ability/status  []         Medical condition  []         Home/family situation and support systems  []         Safety awareness  []         Pain tolerance/management  []         Other:      PLAN :  Recommendations and Planned Interventions:  []           Bed Mobility Training             []    Neuromuscular Re-Education  []           Transfer Training                   []    Orthotic/Prosthetic Training  []           Gait Training                         []    Modalities  []           Therapeutic Exercises           []    Edema Management/Control  []           Therapeutic Activities            []    Patient and Family Training/Education  []           Other (comment):    Frequency/Duration: Patient will be followed by physical therapy  twice daily to address goals. Discharge Recommendations: Home Health  Further Equipment Recommendations for Discharge: none     SUBJECTIVE:   Patient stated I am hungry.     OBJECTIVE DATA SUMMARY:   HISTORY:    Past Medical History:   Diagnosis Date    Arthritis     Chronically on opiate therapy     Cigarette smoker     ct 6/25/13    COPD (chronic obstructive pulmonary disease) (HCC)     Elevated cholesterol     Epicondylitis elbow, medial, right     Hypertension     Hypothyroid     Left hip pain     Onychomycosis     Prediabetes 11/13/2014    S/P colonoscopy 12-12-13    cecal lipoma    S/P colonoscopy 12/12/2013    hemorrhoids,diverticulosis    Tubulovillous adenoma of colon 2008     Past Surgical History:   Procedure Laterality Date    HX BUNIONECTOMY Left 1978    HX BUNIONECTOMY Right 2011    HX COLONOSCOPY      HX HEENT      ORAL     Prior Level of Function/Home Situation: modified independent; using cane and RW lately; lives alone but sister staying with her while recuperating. Personal factors and/or comorbidities impacting plan of care:     Home Situation  Home Environment: Private residence  [place] to Enter: Yes  One/Two Story Residence: Two story  Lift Chair Available: No  Living Alone: No  Support Systems: Family member(s)  Patient Expects to be Discharged to[de-identified] Private residence  Current DME Used/Available at Home: Raghav Gelineau, straight, Walker, rolling    EXAMINATION/PRESENTATION/DECISION MAKING:   Critical Behavior:  Neurologic State: Alert, Appropriate for age  Orientation Level: Appropriate for age, Oriented X4  Cognition: Appropriate decision making, Appropriate for age attention/concentration, Appropriate safety awareness, Follows commands     Hearing: Auditory  Auditory Impairment: None  Skin:  Dressing intact  Edema: none  Range Of Motion:  AROM: Generally decreased, functional           PROM: Generally decreased, functional           Strength:    Strength: Within functional limits                    Tone & Sensation:   Tone: Normal              Sensation: Intact               Coordination:  Coordination: Within functional limits  Vision:      Functional Mobility:  Bed Mobility:     Supine to Sit: Additional time;Stand-by assistance  Sit to Supine: Moderate assistance  Scooting: Modified independent  Transfers:  Sit to Stand: Contact guard assistance; Additional time  Stand to Sit: Contact guard assistance                       Balance:   Sitting: Intact  Standing: Intact; With support  Ambulation/Gait Training:  Distance (ft): 15 Feet (ft)  Assistive Device: Gait belt;Walker, rolling  Ambulation - Level of Assistance: Contact guard assistance     Gait Description (WDL): Exceptions to WDL  Gait Abnormalities: Antalgic;Decreased step clearance     Left Side Weight Bearing: Partial (%) (50)     Stance: Left decreased  Speed/Love: Pace decreased (<100 feet/min)  Step Length: Right shortened;Left shortened Therapeutic Exercises:   Reviewed and practiced ankle pumps, quad, ham and glute isometrics    Functional Measure:  Timed up and go:    Timed Get Up And Go Test:  (unable to complete -blood pressure dropped.)     Timed Up and Go and G-code impairment scale:  Percentage of Impairment CH    0%   CI    1-19% CJ    20-39% CK    40-59% CL    60-79% CM    80-99% CN     100%   Timed   Score 0-56 10 11-12 13-14 15-16 17-18 19 20       < than 10 seconds=Normal  Greater then 13.5 seconds (in elderly)=Increased fall risk   Maria Elena Matthews [place] M. Predicting the probability for falls in community dwelling older adults using the Timed Up and Go Test. Phys Ther. 2000;80:896-903. G codes: In compliance with CMSs Claims Based Outcome Reporting, the following G-code set was chosen for this patient based on their primary functional limitation being treated: The outcome measure chosen to determine the severity of the functional limitation was the Timed Up and Go but unable to complete due to dizziness which was correlated with the impairment scale.     ? Mobility - Walking and Moving Around:     - CURRENT STATUS: CN - 100% impaired, limited or restricted    - GOAL STATUS: CM - 80%-99% impaired, limited or restricted    - D/C STATUS:  ---------------To be determined---------------      Physical Therapy Evaluation Charge Determination   History Examination Presentation Decision-Making   MEDIUM  Complexity : 1-2 comorbidities / personal factors will impact the outcome/ POC  LOW Complexity : 1-2 Standardized tests and measures addressing body structure, function, activity limitation and / or participation in recreation  LOW Complexity : Stable, uncomplicated  Other outcome measures Timed up and go  HIGH       Based on the above components, the patient evaluation is determined to be of the following complexity level: LOW     Pain:  Pain Scale 1: Numeric (0 - 10)  Pain Intensity 1: 0  Pain Location 1: Hip  Pain Orientation 1: Left  Pain Description 1: Constant;Dull;Aching     Activity Tolerance:   Orthostatic hypotensive  Please refer to the flowsheet for vital signs taken during this treatment. After treatment:   []         Patient left in no apparent distress sitting up in chair  [x]         Patient left in no apparent distress in bed  [x]         Call bell left within reach  [x]         Nursing notified  []         Caregiver present  []         Bed alarm activated    COMMUNICATION/EDUCATION:   The patients plan of care was discussed with: Registered Nurse.  []         Fall prevention education was provided and the patient/caregiver indicated understanding. [x]         Patient/family have participated as able in goal setting and plan of care. [x]         Patient/family agree to work toward stated goals and plan of care. []         Patient understands intent and goals of therapy, but is neutral about his/her participation. []         Patient is unable to participate in goal setting and plan of care.     Thank you for this referral.  Raad Kennedy, PT   Time Calculation: 20 mins

## 2018-07-12 LAB
ANION GAP SERPL CALC-SCNC: 8 MMOL/L (ref 5–15)
BUN SERPL-MCNC: 19 MG/DL (ref 6–20)
BUN/CREAT SERPL: 23 (ref 12–20)
CALCIUM SERPL-MCNC: 7.7 MG/DL (ref 8.5–10.1)
CHLORIDE SERPL-SCNC: 114 MMOL/L (ref 97–108)
CO2 SERPL-SCNC: 21 MMOL/L (ref 21–32)
CREAT SERPL-MCNC: 0.82 MG/DL (ref 0.55–1.02)
GLUCOSE SERPL-MCNC: 115 MG/DL (ref 65–100)
HGB BLD-MCNC: 7.1 G/DL (ref 11.5–16)
INR PPP: 1.2 (ref 0.9–1.1)
POTASSIUM SERPL-SCNC: 3.7 MMOL/L (ref 3.5–5.1)
PROTHROMBIN TIME: 12 SEC (ref 9–11.1)
SODIUM SERPL-SCNC: 143 MMOL/L (ref 136–145)

## 2018-07-12 PROCEDURE — 74011250637 HC RX REV CODE- 250/637: Performed by: NURSE PRACTITIONER

## 2018-07-12 PROCEDURE — 36415 COLL VENOUS BLD VENIPUNCTURE: CPT | Performed by: PHYSICIAN ASSISTANT

## 2018-07-12 PROCEDURE — 85018 HEMOGLOBIN: CPT | Performed by: PHYSICIAN ASSISTANT

## 2018-07-12 PROCEDURE — 97530 THERAPEUTIC ACTIVITIES: CPT

## 2018-07-12 PROCEDURE — 97110 THERAPEUTIC EXERCISES: CPT

## 2018-07-12 PROCEDURE — 65270000029 HC RM PRIVATE

## 2018-07-12 PROCEDURE — G8988 SELF CARE GOAL STATUS: HCPCS

## 2018-07-12 PROCEDURE — 97535 SELF CARE MNGMENT TRAINING: CPT

## 2018-07-12 PROCEDURE — 74011250636 HC RX REV CODE- 250/636: Performed by: PHYSICIAN ASSISTANT

## 2018-07-12 PROCEDURE — 74011250637 HC RX REV CODE- 250/637: Performed by: PHYSICIAN ASSISTANT

## 2018-07-12 PROCEDURE — 97165 OT EVAL LOW COMPLEX 30 MIN: CPT

## 2018-07-12 PROCEDURE — G8987 SELF CARE CURRENT STATUS: HCPCS

## 2018-07-12 PROCEDURE — 80048 BASIC METABOLIC PNL TOTAL CA: CPT | Performed by: PHYSICIAN ASSISTANT

## 2018-07-12 PROCEDURE — 97116 GAIT TRAINING THERAPY: CPT

## 2018-07-12 PROCEDURE — 85610 PROTHROMBIN TIME: CPT | Performed by: PHYSICIAN ASSISTANT

## 2018-07-12 RX ADMIN — Medication 10 ML: at 21:40

## 2018-07-12 RX ADMIN — Medication 2 G: at 03:35

## 2018-07-12 RX ADMIN — OXYCODONE HYDROCHLORIDE 5 MG: 5 TABLET ORAL at 00:40

## 2018-07-12 RX ADMIN — OXYCODONE HYDROCHLORIDE 5 MG: 5 TABLET ORAL at 21:40

## 2018-07-12 RX ADMIN — Medication 10 ML: at 13:40

## 2018-07-12 RX ADMIN — ACETAMINOPHEN 650 MG: 325 TABLET ORAL at 12:14

## 2018-07-12 RX ADMIN — SIMVASTATIN 40 MG: 40 TABLET, FILM COATED ORAL at 21:40

## 2018-07-12 RX ADMIN — LEVOTHYROXINE SODIUM 25 MCG: 25 TABLET ORAL at 06:10

## 2018-07-12 RX ADMIN — SODIUM CHLORIDE 125 ML/HR: 900 INJECTION, SOLUTION INTRAVENOUS at 06:12

## 2018-07-12 RX ADMIN — STANDARDIZED SENNA CONCENTRATE AND DOCUSATE SODIUM 1 TABLET: 8.6; 5 TABLET, FILM COATED ORAL at 17:16

## 2018-07-12 RX ADMIN — ACETAMINOPHEN 650 MG: 325 TABLET ORAL at 06:10

## 2018-07-12 RX ADMIN — RIVAROXABAN 10 MG: 10 TABLET, FILM COATED ORAL at 17:16

## 2018-07-12 RX ADMIN — STANDARDIZED SENNA CONCENTRATE AND DOCUSATE SODIUM 1 TABLET: 8.6; 5 TABLET, FILM COATED ORAL at 08:08

## 2018-07-12 RX ADMIN — OXYCODONE HYDROCHLORIDE 5 MG: 5 TABLET ORAL at 03:43

## 2018-07-12 RX ADMIN — ACETAMINOPHEN 650 MG: 325 TABLET ORAL at 17:15

## 2018-07-12 RX ADMIN — OXYCODONE HYDROCHLORIDE 2.5 MG: 5 TABLET ORAL at 12:26

## 2018-07-12 NOTE — PROGRESS NOTES
Problem: Mobility Impaired (Adult and Pediatric)  Goal: *Acute Goals and Plan of Care (Insert Text)  Physical Therapy Goals  Initiated 7/11/2018    1. Patient will move from supine to sit and sit to supine  in bed with modified independence within 4 days. 2. Patient will perform sit to stand with modified independence within 4 days. 3. Patient will ambulate with supervision/set-up for 200 feet with the least restrictive device within 4 days. 4. Patient will ascend/descend 3 stairs with  handrail(s) with supervision/set-up within 4 days. 5. Patient will perform  home exercise program per protocol with supervision/set-up within 4 days. physical Therapy TREATMENT  Patient: Samuel Shepherd (89 y.o. female)  Date: 7/12/2018  Diagnosis: DEGENERATIVE JOINT DISEASE LEFT HIP  Primary osteoarthritis of left hip Primary osteoarthritis of left hip  Procedure(s) (LRB):  LEFT TOTAL HIP REPLACEMENT (Left) 1 Day Post-Op  Precautions:    Chart, physical therapy assessment, plan of care and goals were reviewed. ASSESSMENT:  Pt cleared by RN for mobility with close monitoring of BP. PT session ended yesterday when pt became orthostatic and pt once again became dizzy when attempting to get into the bathroom with RN this morning. Pt's BP low however stable throughout position changes(slight drop in sitting which rebounded while sitting EOB completing exercises). Pt with increased pain with all AROM/PROM of LLE. Was able to come to sitting EOB with increased pain and additional time. Using bed rails to pull trunk towards EOB. Performed seated EOB exercises to stabilize BP. Min A x 1 to stand with RW. Pt's pain once again increasing and only able to tolerate ~2 minutes of standing prior to pt beginning to hyperventilate. Recheck of BP stable and actually increasing from rest. Unable to calm pt down and pt unable to communicate therefore quickly returned to supine. O2 100%, HR 84bpm and /56.  Pt immediately more calm and without SOB upon returning to supine. Pt reports no dizziness however pain causing hyperventilation. RN and NP made aware. Supine: 103/52  Sittin/50  Sitting after 5 min: 100/50  Standin/60  Standing after 2 min: 1126/60  Supine: 112/56    Progression toward goals:  []      Improving appropriately and progressing toward goals  [x]      Improving slowly and progressing toward goals  []      Not making progress toward goals and plan of care will be adjusted     PLAN:  Patient continues to benefit from skilled intervention to address the above impairments. Continue treatment per established plan of care. Discharge Recommendations: To Be Determined  Further Equipment Recommendations for Discharge:  None needed     SUBJECTIVE:   Patient stated It hurt so bad I couldn't;t breathe.     OBJECTIVE DATA SUMMARY:   Critical Behavior:  Neurologic State: Alert  Orientation Level: Oriented X4  Cognition: Appropriate decision making, Appropriate for age attention/concentration, Appropriate safety awareness, Follows commands     Functional Mobility Training:  Bed Mobility:     Supine to Sit: Contact guard assistance; Additional time  Sit to Supine: Maximum assistance;Assist x1           Transfers:  Sit to Stand: Minimum assistance;Assist x1  Stand to Sit: Contact guard assistance                             Balance:  Sitting: Intact  Standing: Impaired  Standing - Static: Good;Constant support  Standing - Dynamic : Fair  Ambulation/Gait Training:                                                        Stairs:              Therapeutic Exercises:   SUPINE  EXERCISES   Sets   Reps   Active Active Assist   Passive Self ROM   Comments   Ankle Pumps 1 5 [x]                                        []                                        []                                        []                                           Quad Sets 1 5 [x]                                        []                                        [] []                                           Heel Slides 1 5 []                                        [x]                                        []                                        []                                           Hip Abduction 1 5 [x]                                        []                                        []                                        []                                           Glut Sets 1 5 [x]                                        []                                        []                                        []                                              []                                        []                                        []                                        []                                              []                                        []                                        []                                        []                                             STANDING  EXERCISES   Sets   Reps   Active Active Assist   Passive Self ROM   Comments   Heel Raises 1 5 [x]                                        []                                        []                                        []                                           Hip Abduction   []                                        []                                        []                                        []                                           Lateral Sway   1 10 [x]                                        []                                        []                                        []                                           Marching 1 10 [x]                                        []                                        []                                        []                                             Pain:  Pain Scale 1: Numeric (0 - 10)  Pain Intensity 1: 6              Activity Tolerance:   Good  Please refer to the flowsheet for vital signs taken during this treatment.   After treatment:   [] Patient left in no apparent distress sitting up in chair  [x] Patient left in no apparent distress in bed  [x] Call bell left within reach  [x] Nursing notified  [] Caregiver present  [] Bed alarm activated    COMMUNICATION/COLLABORATION:   The patients plan of care was discussed with: Registered Nurse    Lio Ricks, PT   Time Calculation: 35 mins

## 2018-07-12 NOTE — PROGRESS NOTES
Problem: Mobility Impaired (Adult and Pediatric)  Goal: *Acute Goals and Plan of Care (Insert Text)  Physical Therapy Goals  Initiated 7/11/2018    1. Patient will move from supine to sit and sit to supine  in bed with modified independence within 4 days. 2. Patient will perform sit to stand with modified independence within 4 days. 3. Patient will ambulate with supervision/set-up for 200 feet with the least restrictive device within 4 days. 4. Patient will ascend/descend 3 stairs with  handrail(s) with supervision/set-up within 4 days. 5. Patient will perform  home exercise program per protocol with supervision/set-up within 4 days. physical Therapy TREATMENT  Patient: Montserrat Miles (89 y.o. female)  Date: 7/12/2018  Diagnosis: DEGENERATIVE JOINT DISEASE LEFT HIP  Primary osteoarthritis of left hip Primary osteoarthritis of left hip  Procedure(s) (LRB):  LEFT TOTAL HIP REPLACEMENT (Left) 1 Day Post-Op  Precautions: Fall, WBAT  Chart, physical therapy assessment, plan of care and goals were reviewed. ASSESSMENT:  Pt cleared by RN for PT session. Continues with slow progress towards all goals limited by pain, low BP and anxiety. Pt in better mood this afternoon and more confident. Pt came to sitting OEB with Min A to advance LLE to EOB. Good seated balance. Stood with Min A x 1 from low bed height. Ambulated with short shuffled steps to [facility] for pt to void. Pt dragging surgical leg on the floor d/t weakness and pain. Manual assist at knee and ankle to clear foot from floor while walking back to bedside chair. Pt tolerated gait trial well. Plan to progress into hallway next session as vitals remained stable. BP post-session 136/63 Left up in chair at end of session with plan for OT to come in to get pt back. If pt does not show large improvements in the next day pt may require short rehab stay prior to return home d/t slow progress acutely.    Progression toward goals:  []      Improving appropriately and progressing toward goals  [x]      Improving slowly and progressing toward goals  []      Not making progress toward goals and plan of care will be adjusted     PLAN:  Patient continues to benefit from skilled intervention to address the above impairments. Continue treatment per established plan of care. Discharge Recommendations:  Home Health and To Be Determined  Further Equipment Recommendations for Discharge:  None     SUBJECTIVE:   Im thinking I really need to go to the bathroom    OBJECTIVE DATA SUMMARY:   Functional Mobility Training:  Bed Mobility:     Supine to Sit: Minimum assistance  Sit to Supine: Moderate assistance  Scooting: Minimum assistance        Transfers:  Sit to Stand: Minimum assistance;Assist x1  Stand to Sit: Contact guard assistance        Bed to Chair: Minimum assistance                    Ambulation/Gait Training:  Distance (ft): 10 Feet (ft) (10ft x 2)  Assistive Device: Gait belt;Walker, rolling  Ambulation - Level of Assistance: Contact guard assistance        Gait Abnormalities: Decreased step clearance; Antalgic; Shuffling gait; Step to gait           Stance: Left decreased  Speed/Love: Slow  Step Length: Right shortened;Left shortened                    Stairs: Therapeutic Exercises:   Exercises performed per protocol. See morning treatment note for description. Pain:  Pain Scale 1: Numeric (0 - 10)  Pain Intensity 1: 0  Pain Location 1: Hip  Pain Orientation 1: Left  Pain Description 1: Aching  Pain Intervention(s) 1: Medication (see MAR)  Activity Tolerance:   Good  Please refer to the flowsheet for vital signs taken during this treatment.   After treatment:   [x] Patient left in no apparent distress sitting up in chair  [] Patient left in no apparent distress in bed  [x] Call bell left within reach  [x] Nursing notified  [] Caregiver present  [] Bed alarm activated    COMMUNICATION/COLLABORATION:   The patients plan of care was discussed with: Registered Nurse    Berta Jacobs, PT   Time Calculation: 24 mins

## 2018-07-12 NOTE — PROGRESS NOTES
TOTAL HIP PROGRESS NOTE      Subjective:     Post-Operative Day: 1 Status Post left Total Hip Arthroplasty  Systemic or Specific Complaints:No Complaints    Objective:     Patient Vitals for the past 24 hrs:   BP Temp Pulse Resp SpO2   07/12/18 0819 93/55 98.9 °F (37.2 °C) 66 16 99 %   07/12/18 0323 105/55 98.6 °F (37 °C) 67 16 100 %   07/11/18 2301 110/55 98 °F (36.7 °C) 64 16 100 %   07/11/18 2013 102/50 97.7 °F (36.5 °C) 60 16 97 %   07/11/18 1910 101/53 97.8 °F (36.6 °C) 60 16 100 %   07/11/18 1815 100/53 97.6 °F (36.4 °C) 61 16 100 %   07/11/18 1740 (!) 87/52 - - - -   07/11/18 1733 (!) 63/40 - - - -   07/11/18 1720 119/61 - - - -   07/11/18 1659 112/65 - (!) 57 16 98 %   07/11/18 1630 - - (!) 50 16 99 %   07/11/18 1615 125/57 - (!) 56 23 99 %   07/11/18 1600 114/55 - (!) 52 18 91 %   07/11/18 1545 118/52 - (!) 57 18 100 %   07/11/18 1530 108/51 - (!) 54 17 100 %   07/11/18 1515 115/55 - (!) 53 16 100 %   07/11/18 1500 106/53 - (!) 53 17 100 %   07/11/18 1445 113/57 - (!) 53 16 100 %   07/11/18 1430 104/53 - (!) 59 16 98 %   07/11/18 1425 105/48 - (!) 48 14 100 %   07/11/18 1420 116/51 - (!) 48 18 100 %   07/11/18 1417 106/48 97.6 °F (36.4 °C) (!) 48 17 100 %   07/11/18 1415 (!) 81/43 - (!) 53 12 100 %       General: alert, cooperative, no distress, appears stated age   Wound: Wound clean and dry no evidence of infection. , No Erythema, No Edema, No Drainage and Wound Intact   Motion: Painless Range of Motion   DVT Exam: No evidence of DVT seen on physical exam.  Negative Braydon's sign. No cords or calf tenderness. No significant calf/ankle edema.      Data Review:    Recent Results (from the past 24 hour(s))   GLUCOSE, POC    Collection Time: 07/11/18 10:23 AM   Result Value Ref Range    Glucose (POC) 86 65 - 100 mg/dL    Performed by Ramsey Frankel    HEMOGLOBIN    Collection Time: 07/12/18  3:48 AM   Result Value Ref Range    HGB 7.1 (L) 11.5 - 16.0 g/dL   PROTHROMBIN TIME + INR    Collection Time: 07/12/18 3:48 AM   Result Value Ref Range    INR 1.2 (H) 0.9 - 1.1      Prothrombin time 12.0 (H) 9.0 - 01.5 sec   METABOLIC PANEL, BASIC    Collection Time: 07/12/18  3:48 AM   Result Value Ref Range    Sodium 143 136 - 145 mmol/L    Potassium 3.7 3.5 - 5.1 mmol/L    Chloride 114 (H) 97 - 108 mmol/L    CO2 21 21 - 32 mmol/L    Anion gap 8 5 - 15 mmol/L    Glucose 115 (H) 65 - 100 mg/dL    BUN 19 6 - 20 MG/DL    Creatinine 0.82 0.55 - 1.02 MG/DL    BUN/Creatinine ratio 23 (H) 12 - 20      GFR est AA >60 >60 ml/min/1.73m2    GFR est non-AA >60 >60 ml/min/1.73m2    Calcium 7.7 (L) 8.5 - 10.1 MG/DL         Assessment:     Status Post left Total Hip Arthroplasty. Doing well postoperatively. .  Bandage aquacell, clean/dry. Labs stable, Hgb 7.1. PT slow secondary to orthostatic hypotension. Pain control WNL. Acute blood loss anemia; post-op currently non-symptomatic at rest.    Plan:     Continues current post-op course  Continue PT per protocol.

## 2018-07-12 NOTE — PROGRESS NOTES
Problem: Self Care Deficits Care Plan (Adult)  Goal: *Acute Goals and Plan of Care (Insert Text)  Occupational Therapy Goals  Initiated: 7/12/2018   1. Patient will perform grooming with supervision/set-up standing at sink within 3 day(s). 2.  Patient will perform bathing with supervision/set-up from chair within 3 day(s). 3.  Patient will perform upper body dressing and lower body dressing with supervision/set-up within 3 day(s). 4.  Patient will perform toilet transfers with supervision/set-up within 3 day(s). 5.  Patient will perform all aspects of toileting with supervision/set-up within 3 day(s). Occupational Therapy EVALUATION  Patient: Marycruz Rico (72 y.o. female)  Date: 7/12/2018  Primary Diagnosis: DEGENERATIVE JOINT DISEASE LEFT HIP  Primary osteoarthritis of left hip  Procedure(s) (LRB):  LEFT TOTAL HIP REPLACEMENT (Left) 1 Day Post-Op   Precautions:   Fall, WBAT    ASSESSMENT :  Based on the objective data described below, the patient presents with decreased independence with self care and functional mobility following admission for [facility]. Pt received sitting in chair and agreeable to intervention. BP assessed from chair and 100/68. Pt fatigued and demonstrating decreased activity tolerance for all activities. Pt returned to supine in bed and repositioned with ice for pain management. Pt making slow progress with therapy this date and also with decreased HgB 7.1. Nursing reports they will recheck HgB tomorrow to reevaluate how she is doing. Patient will benefit from skilled intervention to address the above impairments.   Patients rehabilitation potential is considered to be Good  Factors which may influence rehabilitation potential include:   [x]             None noted  []             Mental ability/status  []             Medical condition  []             Home/family situation and support systems  []             Safety awareness  []             Pain tolerance/management  [] Other: PLAN :  Recommendations and Planned Interventions:  [x]               Self Care Training                  [x]        Therapeutic Activities  [x]               Functional Mobility Training    []        Cognitive Retraining  [x]               Therapeutic Exercises           [x]        Endurance Activities  [x]               Balance Training                   []        Neuromuscular Re-Education  []               Visual/Perceptual Training     [x]   Home Safety Training  [x]               Patient Education                 [x]        Family Training/Education  []               Other (comment):    Frequency/Duration: Patient will be followed by occupational therapy 5 times a week to address goals. Discharge Recommendations: possible rehab pending progress with therapy  Further Equipment Recommendations for Discharge: none noted     SUBJECTIVE:   Patient stated I am feeling alright today.     OBJECTIVE DATA SUMMARY:   HISTORY:   Past Medical History:   Diagnosis Date    Arthritis     Chronically on opiate therapy     Cigarette smoker     ct 6/25/13    COPD (chronic obstructive pulmonary disease) (HCC)     Elevated cholesterol     Epicondylitis elbow, medial, right     Hypertension     Hypothyroid     Left hip pain     Onychomycosis     Prediabetes 11/13/2014    S/P colonoscopy 12-12-13    cecal lipoma    S/P colonoscopy 12/12/2013    hemorrhoids,diverticulosis    Tubulovillous adenoma of colon 2008     Past Surgical History:   Procedure Laterality Date    HX BUNIONECTOMY Left 1978    HX BUNIONECTOMY Right 2011    HX COLONOSCOPY      HX HEENT      ORAL       Prior Level of Function/Environment/Context: Pt was independent at home prior to surgery.    Occupations in which the patient is/was successful, what are the barriers preventing that success:   Performance Patterns (routines, roles, habits, and rituals):   Personal Interests and/or values:   Expanded or extensive additional review of patient history:     Home Situation  Home Environment: Private residence  # Steps to Enter: 3  Rails to Enter: Yes  One/Two Story Residence: Two story  Lift Chair Available: No  Living Alone: No  Support Systems: Family member(s)  Patient Expects to be Discharged to[de-identified] Private residence  Current DME Used/Available at Home: Eveleen Frames, straight, Walker, rolling  Tub or Shower Type: Tub/Shower combination    Hand dominance: Right    EXAMINATION OF PERFORMANCE DEFICITS:  Cognitive/Behavioral Status:  Neurologic State: Alert  Orientation Level: Oriented X4  Cognition: Appropriate for age attention/concentration  Perception: Appears intact  Perseveration: No perseveration noted  Safety/Judgement: Good awareness of safety precautions    Skin: see nursing notes    Edema: none noted    Hearing: Auditory  Auditory Impairment: None    Vision/Perceptual:                           Acuity: Within Defined Limits         Range of Motion:    AROM: Generally decreased, functional  PROM: Generally decreased, functional                      Strength:    Strength: Generally decreased, functional                Coordination:  Coordination: Generally decreased, functional  Fine Motor Skills-Upper: Right Intact; Left Intact    Gross Motor Skills-Upper: Right Intact; Left Intact    Tone & Sensation:    Tone: Normal  Sensation: Intact                      Balance:  Sitting: Intact  Standing: Impaired  Standing - Static: Fair  Standing - Dynamic : Poor    Functional Mobility and Transfers for ADLs:  Bed Mobility:  Supine to Sit:  (received in chair)  Sit to Supine: Moderate assistance  Scooting: Minimum assistance    Transfers:  Sit to Stand: Minimum assistance  Stand to Sit: Minimum assistance  Bed to Chair: Minimum assistance  Toilet Transfer : Minimum assistance    ADL Assessment:  Feeding: Supervision    Oral Facial Hygiene/Grooming: Supervision    Bathing: Moderate assistance    Upper Body Dressing: Supervision    Lower Body Dressing:  Moderate assistance    Toileting: Moderate assistance                ADL Intervention and task modifications:   Pt repositioned from chair to bed with education and training for safe functional transfer usign walker. Pt needing cues for deep breathing as she becomes anxious and RR increases and did not drop in saturations but unclear if due to activity in hand with sensor versus true desaturation. Repositioned in bed following intervention and provided fresh ice pack. Cognitive Retraining  Safety/Judgement: Good awareness of safety precautions    Functional Measure:  Barthel Index:    Bathin  Bladder: 10  Bowels: 10  Groomin  Dressin  Feeding: 10  Mobility: 5  Stairs: 0  Toilet Use: 5  Transfer (Bed to Chair and Back): 10  Total: 60       Barthel and G-code impairment scale:  Percentage of impairment CH  0% CI  1-19% CJ  20-39% CK  40-59% CL  60-79% CM  80-99% CN  100%   Barthel Score 0-100 100 99-80 79-60 59-40 20-39 1-19   0   Barthel Score 0-20 20 17-19 13-16 9-12 5-8 1-4 0      The Barthel ADL Index: Guidelines  1. The index should be used as a record of what a patient does, not as a record of what a patient could do. 2. The main aim is to establish degree of independence from any help, physical or verbal, however minor and for whatever reason. 3. The need for supervision renders the patient not independent. 4. A patient's performance should be established using the best available evidence. Asking the patient, friends/relatives and nurses are the usual sources, but direct observation and common sense are also important. However direct testing is not needed. 5. Usually the patient's performance over the preceding 24-48 hours is important, but occasionally longer periods will be relevant. 6. Middle categories imply that the patient supplies over 50 per cent of the effort. 7. Use of aids to be independent is allowed. Britta Mcclain., Barthel, D.W. (1247). Functional evaluation: the Barthel Index. [address] [facility] (14)2. SAMY Morrell, Sami Maria., Darrin Mitchell., [place], [address] [place] [address] Ave (1999). Measuring the change indisability after inpatient rehabilitation; comparison of the responsiveness of the Barthel Index and Functional Sawyer Measure. Journal of Neurology, Neurosurgery, and Psychiatry, 66(4), 795-072. MELANIE Liriano, LY Saldivar, & Abelino Meredith M.A. (2004.) Assessment of post-stroke quality of life in cost-effectiveness studies: The usefulness of the Barthel Index and the EuroQoL-5D. Quality of Life Research, 13, 619-05     G codes: In compliance with CMSs Claims Based Outcome Reporting, the following G-code set was chosen for this patient based on their primary functional limitation being treated: The outcome measure chosen to determine the severity of the functional limitation was the Barthel index with a score of 60/100 which was correlated with the impairment scale. ? Self Care:     - CURRENT STATUS: CJ - 20%-39% impaired, limited or restricted    - GOAL STATUS: CI - 1%-19% impaired, limited or restricted    - D/C STATUS:  ---------------To be determined---------------     Occupational Therapy Evaluation Charge Determination   History Examination Decision-Making   LOW Complexity : Brief history review  MEDIUM Complexity : 3-5 performance deficits relating to physical, cognitive , or psychosocial skils that result in activity limitations and / or participation restrictions MEDIUM Complexity : Patient may present with comorbidities that affect occupational performnce.  Miniml to moderate modification of tasks or assistance (eg, physical or verbal ) with assesment(s) is necessary to enable patient to complete evaluation       Based on the above components, the patient evaluation is determined to be of the following complexity level: LOW   Pain:  Pain Scale 1: Numeric (0 - 10)  Pain Intensity 1: 0  Pain Location 1: Hip  Pain Orientation 1: Left  Pain Description 1: Aching  Pain Intervention(s) 1: Medication (see MAR)  Activity Tolerance:   VSS throughout session. Patient Vitals for the past 8 hrs:   Temp Pulse Resp BP SpO2   07/12/18 1433 98.1 °F (36.7 °C)  Supine in bed following transfer to bed   67 16 95/50 95 %   07/12/18 1415 -sitting in chair following PT intervention 80 - 100/68 97 %         After treatment:   [] Patient left in no apparent distress sitting up in chair  [x] Patient left in no apparent distress in bed  [x] Call bell left within reach  [x] Nursing notified  [] Caregiver present  [] Bed alarm activated    COMMUNICATION/EDUCATION:   The patients plan of care was discussed with: Physical Therapist and Registered Nurse. [x] Home safety education was provided and the patient/caregiver indicated understanding. [] Patient/family have participated as able in goal setting and plan of care. [x] Patient/family agree to work toward stated goals and plan of care. [] Patient understands intent and goals of therapy, but is neutral about his/her participation. [] Patient is unable to participate in goal setting and plan of care. This patients plan of care is appropriate for delegation to [facility].     Thank you for this referral.  Enedina Forrester, OT  Time Calculation: 20 mins

## 2018-07-12 NOTE — PROGRESS NOTES
Primary Nurse Theo Guido RN and Jason Rodrigez RN performed a dual skin assessment on this patient No impairment noted  Aneesh score is 20

## 2018-07-13 PROBLEM — D64.9 ANEMIA: Status: ACTIVE | Noted: 2018-07-13

## 2018-07-13 LAB
HGB BLD-MCNC: 7.1 G/DL (ref 11.5–16)
INR PPP: 1.4 (ref 0.9–1.1)
PROTHROMBIN TIME: 14.3 SEC (ref 9–11.1)

## 2018-07-13 PROCEDURE — 85018 HEMOGLOBIN: CPT | Performed by: PHYSICIAN ASSISTANT

## 2018-07-13 PROCEDURE — 74011250637 HC RX REV CODE- 250/637: Performed by: PHYSICIAN ASSISTANT

## 2018-07-13 PROCEDURE — 97116 GAIT TRAINING THERAPY: CPT

## 2018-07-13 PROCEDURE — 30233N1 TRANSFUSION OF NONAUTOLOGOUS RED BLOOD CELLS INTO PERIPHERAL VEIN, PERCUTANEOUS APPROACH: ICD-10-PCS | Performed by: ORTHOPAEDIC SURGERY

## 2018-07-13 PROCEDURE — 36430 TRANSFUSION BLD/BLD COMPNT: CPT

## 2018-07-13 PROCEDURE — 85610 PROTHROMBIN TIME: CPT | Performed by: PHYSICIAN ASSISTANT

## 2018-07-13 PROCEDURE — P9016 RBC LEUKOCYTES REDUCED: HCPCS | Performed by: ORTHOPAEDIC SURGERY

## 2018-07-13 PROCEDURE — 0SRB02A REPLACEMENT OF LEFT HIP JOINT WITH METAL ON POLYETHYLENE SYNTHETIC SUBSTITUTE, UNCEMENTED, OPEN APPROACH: ICD-10-PCS | Performed by: ORTHOPAEDIC SURGERY

## 2018-07-13 PROCEDURE — 36415 COLL VENOUS BLD VENIPUNCTURE: CPT | Performed by: PHYSICIAN ASSISTANT

## 2018-07-13 PROCEDURE — 97530 THERAPEUTIC ACTIVITIES: CPT

## 2018-07-13 PROCEDURE — 65270000029 HC RM PRIVATE

## 2018-07-13 RX ORDER — OXYCODONE HYDROCHLORIDE 5 MG/1
5 TABLET ORAL
Qty: 60 TAB | Refills: 0 | Status: SHIPPED | OUTPATIENT
Start: 2018-07-13 | End: 2018-10-01 | Stop reason: ALTCHOICE

## 2018-07-13 RX ORDER — SODIUM CHLORIDE 9 MG/ML
250 INJECTION, SOLUTION INTRAVENOUS AS NEEDED
Status: DISCONTINUED | OUTPATIENT
Start: 2018-07-13 | End: 2018-07-18 | Stop reason: HOSPADM

## 2018-07-13 RX ADMIN — STANDARDIZED SENNA CONCENTRATE AND DOCUSATE SODIUM 1 TABLET: 8.6; 5 TABLET, FILM COATED ORAL at 10:08

## 2018-07-13 RX ADMIN — RIVAROXABAN 10 MG: 10 TABLET, FILM COATED ORAL at 17:07

## 2018-07-13 RX ADMIN — ACETAMINOPHEN 650 MG: 325 TABLET ORAL at 06:00

## 2018-07-13 RX ADMIN — LEVOTHYROXINE SODIUM 25 MCG: 25 TABLET ORAL at 07:17

## 2018-07-13 RX ADMIN — SIMVASTATIN 40 MG: 40 TABLET, FILM COATED ORAL at 21:06

## 2018-07-13 RX ADMIN — Medication 10 ML: at 06:00

## 2018-07-13 RX ADMIN — STANDARDIZED SENNA CONCENTRATE AND DOCUSATE SODIUM 1 TABLET: 8.6; 5 TABLET, FILM COATED ORAL at 20:07

## 2018-07-13 RX ADMIN — ACETAMINOPHEN 650 MG: 325 TABLET ORAL at 14:14

## 2018-07-13 NOTE — PROGRESS NOTES
TOTAL HIP PROGRESS NOTE      Subjective:     Post-Operative Day: 2 Status Post left Total Hip Arthroplasty  Systemic or Specific Complaints:No Complaints    Objective:     Patient Vitals for the past 24 hrs:   BP Temp Pulse Resp SpO2   07/13/18 0215 125/56 99.8 °F (37.7 °C) 75 16 96 %   07/12/18 2126 115/75 98.2 °F (36.8 °C) 80 16 97 %   07/12/18 1433 95/50 98.1 °F (36.7 °C) 67 16 95 %   07/12/18 1423 136/63 - 80 - -   07/12/18 1422 136/73 - - - -   07/12/18 1419 115/72 - - - -   07/12/18 1415 100/68 - 80 - 97 %       General: alert, cooperative, no distress, appears stated age   Wound: Wound clean and dry no evidence of infection. , No Erythema, No Edema and Wound Intact   Motion: Painless Range of Motion   DVT Exam: No evidence of DVT seen on physical exam.  Negative Braydon's sign. No cords or calf tenderness. No significant calf/ankle edema. Data Review:    Recent Results (from the past 24 hour(s))   HEMOGLOBIN    Collection Time: 07/13/18  6:21 AM   Result Value Ref Range    HGB 7.1 (L) 11.5 - 16.0 g/dL   PROTHROMBIN TIME + INR    Collection Time: 07/13/18  6:21 AM   Result Value Ref Range    INR 1.4 (H) 0.9 - 1.1      Prothrombin time 14.3 (H) 9.0 - 11.1 sec         Assessment:     Status Post left Total Hip Arthroplasty. Doing well postoperatively. .  Bandage aquacell; drainage present but contained. Labs stable. PT minimal progression. Pain control WNL. Continues to have fatigue and orthostatic hypotension with PT. Plan:     Continues current post-op course  Continue PT per protocol.   Will receive 1 unit PRBCs, will re-evaluate for possible Home [facility]. discharge (most likely tomorrow)

## 2018-07-13 NOTE — CONSULTS
[address]    Name:Nicole KERNS  MR#: [id number]  : 1940  ACCOUNT #: [de-identified]   DATE OF SERVICE: 2018    HISTORY:  Patient with advanced degenerative joint disease, particularly of her hips, that has been treated as an outpatient conservatively. She now has severe discomfort with ambulation and was subsequently admitted by Thesantosh Schmidt MD, and underwent a total hip replacement of the left hip on the day of admission. We are seeing her in consult for her medical problems. Apparently, there is a concern of anemia with hemoglobin 7.1. The patient complains of fatigue. Other new complaints denied except for postoperative discomfort and discomfort in the contralateral hip. Patient seen for evaluation. CURRENT MEDICATIONS:  Amlodipine 5 mg daily, aspirin 81 mg daily, Celebrex 200 mg b.i.d., Synthroid 25 mcg daily, Lidoderm patch and simvastatin 40 mg daily. PAST MEDICAL HISTORY:  Remarkable for COPD, hypertension, onychomycoses, prediabetes. SURGICAL HISTORY:  Includes the colonoscopy with the finding of a cecal lipoma, a tubulovillous adenoma with last colon exam on 2013. ALLERGIES:  LISINOPRIL, WHICH PRODUCED A COUGH. HABITS:  Smoked a pack of cigarettes per day, has been doing so, and quit about a year ago, she states. Her last chest CT was on 2016 and was unremarkable. REVIEW OF SYSTEMS:  Denies chest pain. No shortness of breath with exertion. Otherwise, 13-point review of systems unremarkable except for postoperative discomfort. SOCIAL HISTORY:  The patient is , lives alone. She completed 3 years of college. She is retired. 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 She has a sister who is an RN and will be involved in her postoperative care. FAMILY HISTORY:  Mother  80 with heart disease. Father  62 with a myocardial infarction. One sister is alive and well. One brother . PHYSICAL EXAMINATION:  GENERAL:  Well-developed, well-nourished, well-structured black female resting quietly in the bed, nice. VITAL SIGNS:  99.8, 75, 16, 125/56, O2 sat 96%. HEENT:  PERRLA, EOM intact. ENT:  Moist mucous membranes. Throat is clear. NECK:  Supple. Thyroid is palpated within normal limits. .  CHEST:  Diminished breath sounds bilaterally. HEART:  Regular rate and rhythm. ABDOMEN:  Soft. Bowel sounds are active. EXTREMITIES:  Dressings intact. NEUROLOGIC:  Nonfocal exam.  HEMATOLOGIC:  No pathological lymph nodes are palpated. IMPRESSIONS:  1. Symptomatic anemia postoperatively. 2.  Status post left total hip replacement. 3.  Chronic obstructive pulmonary disease. 4.  Primary hypertension. 5.  Degenerative joint disease. 6.  History of cigarette abuse. 7.  Prediabetes. DISCUSSION:  Hemoglobin repeat is 7.1. We suggest transfuse 1 unit of packed cells. She apparently had a panic attack yesterday when trying to ambulate, began hyperventilating. Reassurance is given this morning and she is going to try her ambulation again. She did very well postoperatively the day of surgery. I suspect she will do well today. We continue to encourage incentive spirometry and physical activity. Thank you for allowing us to participate in the care of this patient. We will follow her medical problems with you.       Reinier Mueller MD       [facility]  D: 2018 08:33     T: 2018 09:03  JOB #: [id number]

## 2018-07-13 NOTE — PROGRESS NOTES
Care Management Interventions  PCP Verified by CM: Yes (Dr. Radha Davies)  Palliative Care Criteria Met (RRAT>21 & CHF Dx)?: No  Mode of Transport at Discharge: Other (see comment) (TBD)  Transition of Care Consult (CM Consult): Discharge Planning (inpatient rehab)  MyChart Signup: No  Discharge Durable Medical Equipment: No  Health Maintenance Reviewed: Yes  Physical Therapy Consult: Yes  Occupational Therapy Consult: Yes  Speech Therapy Consult: No  Confirm Follow Up Transport: Other (see comment)  Plan discussed with Pt/Family/Caregiver: Yes  Freedom of Choice Offered: Yes  [place] Resource Information Provided?: No  Discharge Location  Discharge Placement: Rehab hospital/unit acute     Reason for Admission:   LEFT TOTAL HIP REPLACEMENT                   RRAT Score: 16                 Do you (patient/family) have any concerns for transition/discharge? None              Plan for utilizing home health:  No, patient needs rehab. Likelihood of readmission? no            Transition of Care Plan:   Inpatient rehab. CM met with patient and sister at bedside. Patient lives alone but her sister is here staying with her. Therapy is recommending rehab. Patient is agreeable. Offered freedom of choice, [facility]. CM sent referral via [email or web address]. Insurance Velora Bushy will need to be obtained prior to discharge. Care management will continue to follow. INNA Basilio. CRM

## 2018-07-13 NOTE — PROGRESS NOTES
Occupational Therapy Note:     Attempted to see pt for continued training but she was just starting 1 unit of blood due to HgB 7.1. Will defer OT intervention today and will follow up Saturday for continued training. Pt is trying to discharge to in-pt rehab possibly over the weekend and in agreement with transfer to rehab facility as she is not yet safe or independent enough to return home.         Tito Faye, OT

## 2018-07-13 NOTE — DISCHARGE INSTRUCTIONS
DC Orders All Total Hips    Case Management for DC planning to Home HC .   - PT 2 times a week for 2 weeks; 50% WBAT with AVOID sudden and extreme movement of your hip (surgical leg). - Xarelto therapy once daily for 35 days post-operatively   - Remove staples at 2 weeks post-op; 7/25/18 .   - Follow up in Office in 2 1/2 weeks. After [facility] for SNF/Rehab    Discharge Instructions Hip Replacement-Dr. Sekou Bartlett    Patient Name: Arron Turner  Date of procedure: 7/11/2018    Procedure: Procedure(s):  LEFT TOTAL HIP REPLACEMENT  Surgeon: Carmen Flores) and Role:     * Butch Brooks MD - Primary   PCP: Ab Nair MD  Date of discharge: No discharge date for patient encounter. Follow up appointments   Follow up with Dr. Sekou Bartlett in 2 weeks. Call [phone] to make an appointment. Activity   50% weight bearing with walker or crutches for 2 weeks, then as tolerated. Refer to pages 23-33 of your handbook for instructions and pictures   Complete you Home Exercise Program daily as instructed by your therapist. Refer to pages 36-42 of your handbook for instructions and pictures   Get up every one hour and walk (except at night when sleeping)   AVOID sudden and extreme movement of your hip (surgical leg)   Do not drive or operate heavy machinery    Incision Care   The Aquacel (brown, waterproof) surgical dressing is to remain on your hip for 7 days. On the 7th day have someone gently peel the dressing off by carefully lifting the edge and stretching it slightly to break the adhesive seal   You will have staples in your hip incision. They will be removed in 2 weeks   If your Aquacel dressing comes loose/off before the 7th day, you may replace it with a dry sterile gauze dressing; change it daily. Once your incision is not draining, you may leave it open to air   You may take a shower with the Aquacel dressing in place.   Once the Aquacel is removed, you may shower and get your incision wet but do not submerge your incision under water in a bath tub, hot tub or swimming pool for 6 weeks after surgery. Preventing blood clots   Xarelto therapy once daily for 35 days post-operatively.  Wear elastic stockings (TEDS) for 4 weeks. Remove them for approximately 1 hour daily for showering/sponge bathing    Pain management   Continue pain medication as prescribed in the hospital   Continue home medications per medication reconciliation   Place an ice bag on the hip for 15-20 minutes after exercising and as needed throughout the day and night    Diet   Resume usual diet; encourage fluids; provide foods high in fiber   Provide stool softeners/laxatives as needed        Rehab/SNF Protocol (to be followed by facility)    **Anticipated length of stay is 10-14 days. Nursing   Remove staples at 2 weeks post-op   Complete head to toe assessment, vital signs   Medication reconciliation   Review pain management   Manage chronic medical conditions    Physical Therapy    Weight bearing status:  Precautions at Admission: Fall, WBAT  Left Side Weight Bearing: Partial (%) (50)       Mobility Status:  Supine to Sit: Minimum assistance  Sit to Stand: Minimum assistance, Assist x1  Sit to Supine: Moderate assistance  Bed to Chair: Minimum assistance    Gait:  Distance (ft): 10 Feet (ft) (10ft x 2)  Ambulation - Level of Assistance: Contact guard assistance  Assistive Device: Gait belt, Walker, rolling  Gait Abnormalities: Decreased step clearance, Antalgic, Shuffling gait, Step to gait    ADL status overall composite:            Toilet Transfer : Minimum assistance       Physical Therapy-anticipate 10-14 day length of stay   Assessment and evaluation-bed mobility; functional transfers (bed, chair, bathroom, stairs); ambulation with equipment, car transfers, safety and ability to get out of house in the event of an emergency   AVOID sudden and extreme movement of the hip (surgical leg)   Discuss pain management   Review how to do ADLs.   Refer to pages 43-47 of handbook    Exercise Program-refer to pages 36-42 of handbook

## 2018-07-13 NOTE — PROGRESS NOTES
Hospital Progress Note    NAME:  Elizabeth Gaston   :   [id number]   MRN:  [id number]     Date/Time:  2018 8:35 AM    Plan:   1. Transfuse 1 units pack cells  2.  Encourage activity  Risk of Deterioration: Low  []           Moderate  [x]           High  []                 Assessment:   Principal Problem:    Primary osteoarthritis of left hip (2018)      S/p thr    Active Problems:    Hypertension ()      COPD (chronic obstructive pulmonary disease) (HCC) ()      Anemia (2018) transfuse 1 unit       Subjective:     Post op discomfort - became very anxious with oob yesterday - willing to be more active today  11 Point Review of Systems:   Negative except no cp    []            Unable to obtain ROS due to:       []            mental status change []            sedated []            intubated     Social History   Substance Use Topics    Smoking status: Light Tobacco Smoker     Packs/day: 0.50     Years: 40.00    Smokeless tobacco: Never Used    Alcohol use No      Comment: NONE IN PAST 5 YEARS     Medications reviewed:  Current Facility-Administered Medications   Medication Dose Route Frequency    0.9% sodium chloride infusion 250 mL  250 mL IntraVENous PRN    rivaroxaban (XARELTO) tablet 10 mg  10 mg Oral DAILY WITH DINNER    sodium chloride (NS) flush 5-10 mL  5-10 mL IntraVENous Q8H    sodium chloride (NS) flush 5-10 mL  5-10 mL IntraVENous PRN    acetaminophen (TYLENOL) tablet 650 mg  650 mg Oral Q6H    acetaminophen (TYLENOL) tablet 650 mg  650 mg Oral Q6H PRN    naloxone (NARCAN) injection 0.4 mg  0.4 mg IntraVENous PRN    hydrOXYzine HCl (ATARAX) tablet 10 mg  10 mg Oral Q8H PRN    senna-docusate (PERICOLACE) 8.6-50 mg per tablet 1 Tab  1 Tab Oral BID    polyethylene glycol (MIRALAX) packet 17 g  17 g Oral DAILY    bisacodyl (DULCOLAX) suppository 10 mg  10 mg Rectal DAILY PRN    amLODIPine (NORVASC) tablet 5 mg  5 mg Oral DAILY    levothyroxine (SYNTHROID) tablet 25 mcg  25 mcg Oral ACB  simvastatin (ZOCOR) tablet 40 mg  40 mg Oral QHS    famotidine (PEPCID) tablet 20 mg  20 mg Oral DAILY PRN    oxyCODONE IR (ROXICODONE) tablet 5 mg  5 mg Oral Q3H PRN    oxyCODONE IR (ROXICODONE) tablet 2.5 mg  2.5 mg Oral Q3H PRN        Objective:   Vitals:  Visit Vitals    /56 (BP 1 Location: Right arm, BP Patient Position: At rest)    Pulse 75    Temp 99.8 °F (37.7 °C)    Resp 16    Ht 5' (1.524 m)    Wt 128 lb (58.1 kg)    SpO2 96%    BMI 25 kg/m2     Temp (24hrs), Av.7 °F (37.1 °C), Min:98.1 °F (36.7 °C), Max:99.8 °F (37.7 °C)    O2 Flow Rate (L/min): 1 l/min O2 Device: Room air    Last 24hr Input/Output:    Intake/Output Summary (Last 24 hours) at 18 0835  Last data filed at 18 1713   Gross per 24 hour   Intake                0 ml   Output              575 ml   Net             -575 ml        PHYSICAL EXAM:  General:    Alert, cooperative, no distress, appears stated age. Head:   Normocephalic, without obvious abnormality, atraumatic. Eyes:   Conjunctivae/corneas clear. PERRLA  Nose:  Nares normal. No drainage or sinus tenderness. Throat:    Lips, mucosa, and tongue normal.  No Thrush  Neck:  Supple, symmetrical,  no adenopathy, thyroid: non tender    no carotid bruit and no JVD. Back:    Symmetric,  No CVA tenderness. Lungs:   Clear to auscultation bilaterally. No Wheezing or Rhonchi. No rales. Chest wall:  No tenderness or deformity. No Accessory muscle use. Heart:   Regular rate and rhythm,  no murmur, rub or gallop. Abdomen:   Soft, non-tender. Not distended. Bowel sounds normal. No masses  Extremities: Extremities normal, atraumatic, No cyanosis. No edema. No clubbing  Skin:     Texture, turgor normal. No rashes or lesions. Not Jaundiced  Lymph nodes: Cervical, supraclavicular normal.  Psych:  Good insight. Not depressed. Not anxious or agitated. Neurologic: Normal strength, Alert and oriented X 3.        Lab Data Reviewed:    Recent Labs 07/13/18 0621 07/12/18 0348   HGB  7.1*  7.1*     Recent Labs      07/13/18 0621 07/12/18 0348   NA   --   143   K   --   3.7   CL   --   114*   CO2   --   21   GLU   --   115*   BUN   --   19   CREA   --   0.82   CA   --   7.7*   INR  1.4*  1.2*     Lab Results   Component Value Date/Time    Glucose (POC) 86 07/11/2018 10:23 AM     No results for input(s): PH, PCO2, PO2, HCO3, FIO2 in the last 72 hours.   Recent Labs      07/13/18 0621 07/12/18 0348   INR  1.4*  1.2*     ___________________________________________________  ___________________________________________________    Attending Physician: Luisana Serra MD

## 2018-07-13 NOTE — PROGRESS NOTES
Bedside and Verbal shift change report given to Carrie Pelletier RN (oncoming nurse) by Belle Drake RN (offgoing nurse). Report included the following information SBAR, Kardex, OR Summary, Procedure Summary, Intake/Output, MAR and Recent Results.

## 2018-07-13 NOTE — PROGRESS NOTES
Problem: Mobility Impaired (Adult and Pediatric)  Goal: *Acute Goals and Plan of Care (Insert Text)  Physical Therapy Goals  Initiated 7/11/2018    1. Patient will move from supine to sit and sit to supine  in bed with modified independence within 4 days. 2. Patient will perform sit to stand with modified independence within 4 days. 3. Patient will ambulate with supervision/set-up for 200 feet with the least restrictive device within 4 days. 4. Patient will ascend/descend 3 stairs with  handrail(s) with supervision/set-up within 4 days. 5. Patient will perform  home exercise program per protocol with supervision/set-up within 4 days. physical Therapy TREATMENT  Patient: Vicky Goins (49 y.o. female)  Date: 7/13/2018  Diagnosis: DEGENERATIVE JOINT DISEASE LEFT HIP  Primary osteoarthritis of left hip Primary osteoarthritis of left hip  Procedure(s) (LRB):  LEFT TOTAL HIP REPLACEMENT (Left) 2 Days Post-Op  Precautions: Fall, WBAT  Chart, physical therapy assessment, plan of care and goals were reviewed. ASSESSMENT:  Patient continues to be limited in her mobility by multiple factors. Her overall endurance is poor, though she did maintain stable BP with activity today 086-940 systolic and note plan for transfusion this afternoon. She does become very anxious with activity and begins to hyperventilate and has difficulty calming herself. In addition, she is weak and has difficulty advancing her LLE in gait. Discussed importance of mobility and activity recommendations and restrictions with patient to ease her fears of rehab process. She is not safe for discharge home today and expect that she will need rehab prior to return home. She could likely tolerate 3 hours of therapy a day and does have comorbidities of COPD and post op anemia requiring a transfusion.   Progression toward goals:  []    Improving appropriately and progressing toward goals  [x]    Improving slowly and progressing toward goals  [] Not making progress toward goals and plan of care will be adjusted     PLAN:  Patient continues to benefit from skilled intervention to address the above impairments. Continue treatment per established plan of care. Discharge Recommendations:  Rehab  Further Equipment Recommendations for Discharge: To be determined in rehab     SUBJECTIVE:   Patient stated I get so nervous and I didn't think it would hurt this much.     OBJECTIVE DATA SUMMARY:   Critical Behavior:  Neurologic State: Alert, Appropriate for age  Orientation Level: Oriented X4, Appropriate for age  Cognition: Appropriate decision making, Appropriate for age attention/concentration, Appropriate safety awareness  Safety/Judgement: Good awareness of safety precautions  Functional Mobility Training:  Bed Mobility:     Supine to Sit: Moderate assistance; Adaptive equipment; Additional time (using strap to assist the LLE)  Sit to Supine: Maximum assistance           Transfers:  Sit to Stand: Moderate assistance; Adaptive equipment; Additional time  Stand to Sit: Minimum assistance; Adaptive equipment; Additional time        Bed to Chair: Moderate assistance; Adaptive equipment; Additional time                    Balance:  Sitting: Intact; Without support  Standing: Impaired; With support  Standing - Static: Fair  Standing - Dynamic : Fair  Ambulation/Gait Training:  Distance (ft): 15 Feet (ft)  Assistive Device: Gait belt;Walker, rolling  Ambulation - Level of Assistance: Moderate assistance; Adaptive equipment; Additional time        Gait Abnormalities: Antalgic;Decreased step clearance; Step to gait (difficulty advancing LLE)     Left Side Weight Bearing: Partial (%) (50)     Stance: Left decreased  Speed/Love: Pace decreased (<100 feet/min)  Step Length: Left shortened;Right shortened                    Stairs:              Neuro Re-Education:    Therapeutic Exercises:     Pain:  Pain Scale 1: Numeric (0 - 10)  Pain Intensity 1: 3              Activity Tolerance:   Fair, very limited gait distance due to fatigue, but VSS  Please refer to the flowsheet for vital signs taken during this treatment.   After treatment:   []    Patient left in no apparent distress sitting up in chair  [x]    Patient left in no apparent distress in bed, ice in place  [x]    Call bell left within reach  [x]    Nursing notified  [x]    Caregiver present  []    Bed alarm activated    COMMUNICATION/COLLABORATION:   The patients plan of care was discussed with: Occupational Therapist, Registered Nurse and     Andreea Hutchins, PT   Time Calculation: 37 mins

## 2018-07-13 NOTE — PROGRESS NOTES
Bedside and Verbal shift change report given to Maykel Serrano RN (oncoming nurse) by SHIKHA Sandhu (offgoing nurse). Report included the following information SBAR, Kardex, OR Summary, Procedure Summary, Intake/Output, MAR and Recent Results.

## 2018-07-14 LAB
ABO + RH BLD: NORMAL
ANION GAP SERPL CALC-SCNC: 10 MMOL/L (ref 5–15)
BASOPHILS # BLD: 0 K/UL (ref 0–0.1)
BASOPHILS NFR BLD: 0 % (ref 0–1)
BLD PROD TYP BPU: NORMAL
BLOOD GROUP ANTIBODIES SERPL: NORMAL
BPU ID: NORMAL
BUN SERPL-MCNC: 8 MG/DL (ref 6–20)
BUN/CREAT SERPL: 15 (ref 12–20)
CALCIUM SERPL-MCNC: 8.1 MG/DL (ref 8.5–10.1)
CHLORIDE SERPL-SCNC: 111 MMOL/L (ref 97–108)
CO2 SERPL-SCNC: 22 MMOL/L (ref 21–32)
CREAT SERPL-MCNC: 0.55 MG/DL (ref 0.55–1.02)
CROSSMATCH RESULT,%XM: NORMAL
DIFFERENTIAL METHOD BLD: ABNORMAL
EOSINOPHIL # BLD: 0 K/UL (ref 0–0.4)
EOSINOPHIL NFR BLD: 0 % (ref 0–7)
ERYTHROCYTE [DISTWIDTH] IN BLOOD BY AUTOMATED COUNT: 14.7 % (ref 11.5–14.5)
GLUCOSE SERPL-MCNC: 93 MG/DL (ref 65–100)
HCT VFR BLD AUTO: 26.1 % (ref 35–47)
HGB BLD-MCNC: 8.5 G/DL (ref 11.5–16)
IMM GRANULOCYTES # BLD: 0 K/UL (ref 0–0.04)
IMM GRANULOCYTES NFR BLD AUTO: 0 % (ref 0–0.5)
INR PPP: 1.3 (ref 0.9–1.1)
LYMPHOCYTES # BLD: 0.9 K/UL (ref 0.8–3.5)
LYMPHOCYTES NFR BLD: 12 % (ref 12–49)
MCH RBC QN AUTO: 29.8 PG (ref 26–34)
MCHC RBC AUTO-ENTMCNC: 32.6 G/DL (ref 30–36.5)
MCV RBC AUTO: 91.6 FL (ref 80–99)
MONOCYTES # BLD: 0.8 K/UL (ref 0–1)
MONOCYTES NFR BLD: 10 % (ref 5–13)
NEUTS SEG # BLD: 6.1 K/UL (ref 1.8–8)
NEUTS SEG NFR BLD: 77 % (ref 32–75)
NRBC # BLD: 0 K/UL (ref 0–0.01)
NRBC BLD-RTO: 0 PER 100 WBC
PLATELET # BLD AUTO: 121 K/UL (ref 150–400)
PMV BLD AUTO: 11 FL (ref 8.9–12.9)
POTASSIUM SERPL-SCNC: 3.1 MMOL/L (ref 3.5–5.1)
PROTHROMBIN TIME: 12.7 SEC (ref 9–11.1)
RBC # BLD AUTO: 2.85 M/UL (ref 3.8–5.2)
SODIUM SERPL-SCNC: 143 MMOL/L (ref 136–145)
SPECIMEN EXP DATE BLD: NORMAL
STATUS OF UNIT,%ST: NORMAL
UNIT DIVISION, %UDIV: 0
WBC # BLD AUTO: 7.8 K/UL (ref 3.6–11)

## 2018-07-14 PROCEDURE — 74011250637 HC RX REV CODE- 250/637: Performed by: NURSE PRACTITIONER

## 2018-07-14 PROCEDURE — 65270000029 HC RM PRIVATE

## 2018-07-14 PROCEDURE — 85610 PROTHROMBIN TIME: CPT | Performed by: PHYSICIAN ASSISTANT

## 2018-07-14 PROCEDURE — 97116 GAIT TRAINING THERAPY: CPT

## 2018-07-14 PROCEDURE — 97535 SELF CARE MNGMENT TRAINING: CPT

## 2018-07-14 PROCEDURE — 74011250637 HC RX REV CODE- 250/637: Performed by: PHYSICIAN ASSISTANT

## 2018-07-14 PROCEDURE — 94760 N-INVAS EAR/PLS OXIMETRY 1: CPT

## 2018-07-14 PROCEDURE — 36415 COLL VENOUS BLD VENIPUNCTURE: CPT | Performed by: PHYSICIAN ASSISTANT

## 2018-07-14 PROCEDURE — 85025 COMPLETE CBC W/AUTO DIFF WBC: CPT | Performed by: INTERNAL MEDICINE

## 2018-07-14 PROCEDURE — 80048 BASIC METABOLIC PNL TOTAL CA: CPT | Performed by: INTERNAL MEDICINE

## 2018-07-14 RX ORDER — FAMOTIDINE 20 MG/1
20 TABLET, FILM COATED ORAL
Status: DISCONTINUED | OUTPATIENT
Start: 2018-07-14 | End: 2018-07-18 | Stop reason: HOSPADM

## 2018-07-14 RX ADMIN — SIMVASTATIN 40 MG: 40 TABLET, FILM COATED ORAL at 21:24

## 2018-07-14 RX ADMIN — ACETAMINOPHEN 650 MG: 325 TABLET ORAL at 19:01

## 2018-07-14 RX ADMIN — ACETAMINOPHEN 650 MG: 325 TABLET ORAL at 13:41

## 2018-07-14 RX ADMIN — Medication 5 ML: at 21:13

## 2018-07-14 RX ADMIN — LEVOTHYROXINE SODIUM 25 MCG: 25 TABLET ORAL at 06:30

## 2018-07-14 RX ADMIN — ACETAMINOPHEN 650 MG: 325 TABLET ORAL at 06:31

## 2018-07-14 RX ADMIN — STANDARDIZED SENNA CONCENTRATE AND DOCUSATE SODIUM 1 TABLET: 8.6; 5 TABLET, FILM COATED ORAL at 09:35

## 2018-07-14 RX ADMIN — STANDARDIZED SENNA CONCENTRATE AND DOCUSATE SODIUM 1 TABLET: 8.6; 5 TABLET, FILM COATED ORAL at 19:02

## 2018-07-14 RX ADMIN — RIVAROXABAN 10 MG: 10 TABLET, FILM COATED ORAL at 19:01

## 2018-07-14 RX ADMIN — POLYETHYLENE GLYCOL 3350 17 G: 17 POWDER, FOR SOLUTION ORAL at 09:36

## 2018-07-14 RX ADMIN — OXYCODONE HYDROCHLORIDE 5 MG: 5 TABLET ORAL at 09:35

## 2018-07-14 RX ADMIN — OXYCODONE HYDROCHLORIDE 5 MG: 5 TABLET ORAL at 13:41

## 2018-07-14 NOTE — PROGRESS NOTES
Bedside and Verbal shift change report given to Darryl pastor RN (oncoming nurse) by Yuliet Russo RN (offgoing nurse). Report included the following information SBAR, Kardex, OR Summary, Procedure Summary, Intake/Output, MAR and Recent Results.

## 2018-07-14 NOTE — PROGRESS NOTES
Bedside and Verbal shift change report given to Margarito RN (oncoming nurse) by Domenic Toro RN (offgoing nurse). Report included the following information SBAR, Kardex and MAR.

## 2018-07-14 NOTE — PROGRESS NOTES
Bedside and Verbal shift change report given to Christal Tyler (oncoming nurse) by Ekaterina Tamez (offgoing nurse). Report included the following information SBAR.

## 2018-07-14 NOTE — PROGRESS NOTES
Problem: Self Care Deficits Care Plan (Adult)  Goal: *Acute Goals and Plan of Care (Insert Text)  Occupational Therapy Goals  Initiated: 7/12/2018   1. Patient will perform grooming with supervision/set-up standing at sink within 3 day(s). 2.  Patient will perform bathing with supervision/set-up from chair within 3 day(s). 3.  Patient will perform upper body dressing and lower body dressing with supervision/set-up within 3 day(s). 4.  Patient will perform toilet transfers with supervision/set-up within 3 day(s). 5.  Patient will perform all aspects of toileting with supervision/set-up within 3 day(s). Occupational Therapy TREATMENT  Patient: Heather Be (47 y.o. female)  Date: 7/14/2018  Diagnosis: DEGENERATIVE JOINT DISEASE LEFT HIP  Primary osteoarthritis of left hip Primary osteoarthritis of left hip  Procedure(s) (LRB):  LEFT TOTAL HIP REPLACEMENT (Left) 3 Days Post-Op  Precautions: Fall, WBAT  Chart, occupational therapy assessment, plan of care, and goals were reviewed. ASSESSMENT:  Pt received supine in bed agreeable to working with OT on goals of performing LB dressing with AE. Family member present at bedside and reported that she has already ordered her a hip and showed the kit to this OT. Pt elected to practice using AE from therapist and leave her AE in the bag which will be taken home with family. Pt with improved bed mobility using the gait belt as a leg  and was able to transition from supine to sitting EOB with increased time and supervision (but must have the [facility] elevated). Pt does require up to MOD A for bed to chair and chair to [facility] transfers due to need to advance the L LE at times and pull chair up behind pt. Overall requires MOD A for LB dressing using AE due to decreased L LE strength. Pt is highly motivated and has good support system; will be an excellent IP rehab candidate.     Progression toward goals:  [x]          Improving appropriately and progressing toward goals  []          Improving slowly and progressing toward goals  []          Not making progress toward goals and plan of care will be adjusted     PLAN:  Patient continues to benefit from skilled intervention to address the above impairments. Continue treatment per established plan of care. Discharge Recommendations:  Inpatient Rehab  Further Equipment Recommendations for Discharge:  Has AE     SUBJECTIVE:   Patient stated I need to try to do this by myself.     OBJECTIVE DATA SUMMARY:   Cognitive/Behavioral Status:  Neurologic State: Alert, Appropriate for age  Orientation Level: Appropriate for age  Cognition: Appropriate decision making, Appropriate for age attention/concentration, Appropriate safety awareness, Follows commands  Safety/Judgement: Good awareness of safety precautions    Functional Mobility and Transfers for ADLs:  Bed Mobility:  Supine to Sit: Supervision (using gait belt as leg )  Scooting: Supervision    Transfers:  Sit to Stand: Minimum assistance  Functional Transfers  Toilet Transfer : Moderate assistance  Adaptive Equipment: Bedside commode;Walker (comment)   Bed to Chair: Moderate assistance    Balance:  Sitting: Intact  Standing: Impaired; With support  Standing - Static: Good  Standing - Dynamic : Fair (with walker support)    ADL Intervention and Instruction:     Lower Body Dressing Assistance  Dressing Assistance: Moderate assistance  Pants With Elastic Waist: Minimum assistance  Socks: Minimum assistance  Position Performed: Seated in chair  Adaptive Equipment Used: Reacher;Sock aid; Walker    Toileting  Toileting Assistance: Minimum assistance  Bladder Hygiene: Minimum assistance  Adaptive Equipment: Walker    Dressing joint: Patient instructed and demonstrated to don/doff Left LE first/last.  Patient instructed and demonstrated to don all clothing while sitting prior to standing, doff all clothing to knees while standing, then sit to doff clothing off from knees to feet in order to facilitate fall prevention, pain management, and energy conservation with Moderate Assistance. Pain:  Pain Scale 1: Visual (given b4pt)     Pain Location 1: Hip  Pain Orientation 1: Left  Pain Description 1: Aching  Pain Intervention(s) 1: Medication (see MAR); Ice  Activity Tolerance: Tolerated session well; no s/s of distress; provided ice pack following OT treatment    Please refer to the flowsheet for vital signs taken during this treatment.   After treatment:   [x] Patient left in no apparent distress sitting up in chair  [] Patient left in no apparent distress in bed  [x] Call bell left within reach  [x] Nursing notified  [] Caregiver present  [] Bed alarm activated    COMMUNICATION/COLLABORATION:   The patients plan of care was discussed with: Physical Therapist and Registered Nurse    Marcia Capps OT  Time Calculation: 35 mins

## 2018-07-14 NOTE — PROGRESS NOTES
Renal Dosing/Monitoring  Medication: Famotidine   Current regimen:  20 mg PO daily as needed  Recent Labs      07/14/18   0627  07/12/18   0348   CREA  0.55  0.82   BUN  8  19     Estimated CrCl:  ~60-70 ml/min  Plan: Change to 20 mg PO Q 12 hr PRN per [facility] P&T Committee Protocol with respect to renal function. Pharmacy will continue to monitor patient daily and will make dosage adjustments based upon changing renal function.

## 2018-07-14 NOTE — PROGRESS NOTES
Problem: Mobility Impaired (Adult and Pediatric)  Goal: *Acute Goals and Plan of Care (Insert Text)  Physical Therapy Goals  Initiated 7/11/2018    1. Patient will move from supine to sit and sit to supine  in bed with modified independence within 4 days. 2. Patient will perform sit to stand with modified independence within 4 days. 3. Patient will ambulate with supervision/set-up for 200 feet with the least restrictive device within 4 days. 4. Patient will ascend/descend 3 stairs with  handrail(s) with supervision/set-up within 4 days. 5. Patient will perform  home exercise program per protocol with supervision/set-up within 4 days. physical Therapy TREATMENT  Patient: Gely Calvillo (56 y.o. female)  Date: 7/14/2018  Diagnosis: DEGENERATIVE JOINT DISEASE LEFT HIP  Primary osteoarthritis of left hip Primary osteoarthritis of left hip  Procedure(s) (LRB):  LEFT TOTAL HIP REPLACEMENT (Left) 3 Days Post-Op  Precautions: Fall, WBAT  Chart, physical therapy assessment, plan of care and goals were reviewed.     ASSESSMENT:  Pt received supine, alert and agreeable to participate, completed supine post op exercises with minimal assistance provided for gentle hip flexion, pt requested to use the gait belt and was able to transfer supine to sit with moderate assistance overall, verbal cueing, and extra time, pt was able to maneuver LLE to side of bed and lower off EOB with the gait belt with minimal difficulty, sitting balance good, pt remained calm while sitting, stood with moderate assistance with cueing for hand placement, hesitant initially with ambulation stating that she was stiff and having difficulty advancing her LLE, with extra time pt able to initiate steps independently and tolerated ambulation x 25 feet with minimal assistance x 2, noted heavier breathing but no episode of hyperventilating this date, pt reports high pain levels, 8/10 on VAS with activity and relates her heavy breathing to this,  pt remained up in chair at end of session, recommend inpatient rehab to maximize strength and safe, functional mobility, pt agreeable with plan and motivated to increase her independence  Progression toward goals:  []      Improving appropriately and progressing toward goals  [x]      Improving slowly and progressing toward goals  []      Not making progress toward goals and plan of care will be adjusted     PLAN:  Patient continues to benefit from skilled intervention to address the above impairments. Continue treatment per established plan of care. Discharge Recommendations:  Inpatient Rehab  Further Equipment Recommendations for Discharge:  Has a cane and rolling walker, reports her sister is looking into getting the hip kit      SUBJECTIVE:   Patient stated I did not hyperventilate this time.     OBJECTIVE DATA SUMMARY:   Critical Behavior:  Neurologic State: Alert  Orientation Level: Oriented X4  Cognition: Appropriate decision making, Appropriate for age attention/concentration, Appropriate safety awareness, Follows commands  Safety/Judgement: Good awareness of safety precautions  Functional Mobility Training:  Bed Mobility:     Supine to Sit: Moderate assistance;Assist x1;Adaptive equipment; Additional time, pt used gait belt to assist with lowering LLE off EOB     Scooting: Minimum assistance;Assist x1        Transfers:  Sit to Stand: Assist x1; Moderate assistance; Additional time  Stand to Sit: Assist x1;Additional time;Minimum assistance                             Balance:  Sitting: Intact  Standing: Impaired  Standing - Static: Good  Standing - Dynamic : Fair (with walker support)  Ambulation/Gait Training:  Distance (ft): 25 Feet (ft)  Assistive Device: Gait belt;Walker, rolling  Ambulation - Level of Assistance: Assist x2;Minimal assistance        Gait Abnormalities: Antalgic;Decreased step clearance; Step to gait     Left Side Weight Bearing: Partial (50%)      Stance: Left decreased  Speed/Love: Four Eyes decreased (<100 feet/min)  Step Length: Right shortened;Left shortened              Therapeutic Exercises:   SUPINE  EXERCISES   Sets   Reps   Active Active Assist   Passive Self ROM   Comments   Ankle Pumps   []                                        []                                        []                                        []                                           Quad Sets 1 10 [x]                                        []                                        []                                        []                                           Heel Slides 1 10 []                                        [x]                                        []                                        []                                           Hip Abduction   []                                        []                                        []                                        []                                           Glut Sets 1 10 [x]                                        []                                        []                                        []                                              []                                        []                                        []                                        []                                              []                                        []                                        []                                        []                                             STANDING  EXERCISES   Sets   Reps   Active Active Assist   Passive Self ROM   Comments   Heel Raises   []                                        []                                        []                                        []                                           Hip Abduction   []                                        []                                        []                                        []                                              [] []                                        []                                        []                                              []                                        []                                        []                                        []                                             Pain:  Pain Scale 1: Verbal: 8     Pain Location 1: Hip  Pain Orientation 1: Left  Pain Description 1: Aching;Dull  Pain Intervention(s) 1: Medication (see MAR); Ice  Activity Tolerance:   Gradually improving     After treatment:   [x] Patient left in no apparent distress sitting up in chair  [] Patient left in no apparent distress in bed  [x] Call bell left within reach  [x] Nursing notified  [] Caregiver present  [] Bed alarm activated    COMMUNICATION/COLLABORATION:   The patients plan of care was discussed with: Registered Nurse    Amy Larene Kayser   Time Calculation: 17 mins

## 2018-07-15 LAB
ANION GAP SERPL CALC-SCNC: 9 MMOL/L (ref 5–15)
BASOPHILS # BLD: 0.1 K/UL (ref 0–0.1)
BASOPHILS NFR BLD: 1 % (ref 0–1)
BUN SERPL-MCNC: 12 MG/DL (ref 6–20)
BUN/CREAT SERPL: 19 (ref 12–20)
CALCIUM SERPL-MCNC: 8.2 MG/DL (ref 8.5–10.1)
CHLORIDE SERPL-SCNC: 112 MMOL/L (ref 97–108)
CO2 SERPL-SCNC: 24 MMOL/L (ref 21–32)
CREAT SERPL-MCNC: 0.62 MG/DL (ref 0.55–1.02)
EOSINOPHIL # BLD: 0.1 K/UL (ref 0–0.4)
EOSINOPHIL NFR BLD: 1 % (ref 0–7)
ERYTHROCYTE [DISTWIDTH] IN BLOOD BY AUTOMATED COUNT: 14.1 % (ref 11.5–14.5)
GLUCOSE SERPL-MCNC: 104 MG/DL (ref 65–100)
HCT VFR BLD AUTO: 25.2 % (ref 35–47)
HGB BLD-MCNC: 8.3 G/DL (ref 11.5–16)
IMM GRANULOCYTES # BLD: 0 K/UL (ref 0–0.04)
IMM GRANULOCYTES NFR BLD AUTO: 0 % (ref 0–0.5)
INR PPP: 1.3 (ref 0.9–1.1)
LYMPHOCYTES # BLD: 0.7 K/UL (ref 0.8–3.5)
LYMPHOCYTES NFR BLD: 11 % (ref 12–49)
MCH RBC QN AUTO: 29.9 PG (ref 26–34)
MCHC RBC AUTO-ENTMCNC: 32.9 G/DL (ref 30–36.5)
MCV RBC AUTO: 90.6 FL (ref 80–99)
MONOCYTES # BLD: 0.7 K/UL (ref 0–1)
MONOCYTES NFR BLD: 11 % (ref 5–13)
NEUTS SEG # BLD: 4.4 K/UL (ref 1.8–8)
NEUTS SEG NFR BLD: 76 % (ref 32–75)
NRBC # BLD: 0 K/UL (ref 0–0.01)
NRBC BLD-RTO: 0 PER 100 WBC
PLATELET # BLD AUTO: 152 K/UL (ref 150–400)
PMV BLD AUTO: 10.7 FL (ref 8.9–12.9)
POTASSIUM SERPL-SCNC: 3.2 MMOL/L (ref 3.5–5.1)
PROTHROMBIN TIME: 13.5 SEC (ref 9–11.1)
RBC # BLD AUTO: 2.78 M/UL (ref 3.8–5.2)
RBC MORPH BLD: ABNORMAL
SODIUM SERPL-SCNC: 145 MMOL/L (ref 136–145)
WBC # BLD AUTO: 6 K/UL (ref 3.6–11)

## 2018-07-15 PROCEDURE — 85610 PROTHROMBIN TIME: CPT | Performed by: PHYSICIAN ASSISTANT

## 2018-07-15 PROCEDURE — 97116 GAIT TRAINING THERAPY: CPT

## 2018-07-15 PROCEDURE — 74011250637 HC RX REV CODE- 250/637: Performed by: PHYSICIAN ASSISTANT

## 2018-07-15 PROCEDURE — 74011250637 HC RX REV CODE- 250/637: Performed by: NURSE PRACTITIONER

## 2018-07-15 PROCEDURE — 65270000029 HC RM PRIVATE

## 2018-07-15 PROCEDURE — 77030012935 HC DRSG AQUACEL BMS -B

## 2018-07-15 PROCEDURE — 80048 BASIC METABOLIC PNL TOTAL CA: CPT | Performed by: INTERNAL MEDICINE

## 2018-07-15 PROCEDURE — 85025 COMPLETE CBC W/AUTO DIFF WBC: CPT | Performed by: INTERNAL MEDICINE

## 2018-07-15 PROCEDURE — 94760 N-INVAS EAR/PLS OXIMETRY 1: CPT

## 2018-07-15 PROCEDURE — 36415 COLL VENOUS BLD VENIPUNCTURE: CPT | Performed by: PHYSICIAN ASSISTANT

## 2018-07-15 RX ADMIN — SIMVASTATIN 40 MG: 40 TABLET, FILM COATED ORAL at 22:23

## 2018-07-15 RX ADMIN — Medication 5 ML: at 14:00

## 2018-07-15 RX ADMIN — RIVAROXABAN 10 MG: 10 TABLET, FILM COATED ORAL at 18:17

## 2018-07-15 RX ADMIN — LEVOTHYROXINE SODIUM 25 MCG: 25 TABLET ORAL at 06:46

## 2018-07-15 RX ADMIN — Medication 10 ML: at 22:24

## 2018-07-15 RX ADMIN — OXYCODONE HYDROCHLORIDE 5 MG: 5 TABLET ORAL at 08:46

## 2018-07-15 RX ADMIN — ACETAMINOPHEN 650 MG: 325 TABLET ORAL at 18:17

## 2018-07-15 RX ADMIN — Medication 5 ML: at 06:00

## 2018-07-15 RX ADMIN — ACETAMINOPHEN 650 MG: 325 TABLET ORAL at 06:46

## 2018-07-15 RX ADMIN — OXYCODONE HYDROCHLORIDE 5 MG: 5 TABLET ORAL at 13:22

## 2018-07-15 RX ADMIN — STANDARDIZED SENNA CONCENTRATE AND DOCUSATE SODIUM 1 TABLET: 8.6; 5 TABLET, FILM COATED ORAL at 10:20

## 2018-07-15 RX ADMIN — ACETAMINOPHEN 650 MG: 325 TABLET ORAL at 13:22

## 2018-07-15 RX ADMIN — POLYETHYLENE GLYCOL 3350 17 G: 17 POWDER, FOR SOLUTION ORAL at 10:20

## 2018-07-15 NOTE — PROGRESS NOTES
Hospital Progress Note    NAME:  Nimco Faye   :      MRN:  [id number]     Date/Time:  7/15/2018 8:35 AM    Plan:   1. rehab  2.  Encourage activity  Risk of Deterioration: Low  []           Moderate  [x]           High  []                 Assessment:   Principal Problem:    Primary osteoarthritis of left hip (2018)      S/p thr    Active Problems:    Hypertension ()      COPD (chronic obstructive pulmonary disease) (HCC) ()      Anemia (2018) transfuse 1 unit       Subjective:      feeling better this am  11 Point Review of Systems:   Negative except no cp    []            Unable to obtain ROS due to:       []            mental status change []            sedated []            intubated     Social History   Substance Use Topics    Smoking status: Light Tobacco Smoker     Packs/day: 0.50     Years: 40.00    Smokeless tobacco: Never Used    Alcohol use No      Comment: NONE IN PAST 5 YEARS     Medications reviewed:  Current Facility-Administered Medications   Medication Dose Route Frequency    famotidine (PEPCID) tablet 20 mg  20 mg Oral Q12H PRN    0.9% sodium chloride infusion 250 mL  250 mL IntraVENous PRN    rivaroxaban (XARELTO) tablet 10 mg  10 mg Oral DAILY WITH DINNER    sodium chloride (NS) flush 5-10 mL  5-10 mL IntraVENous Q8H    sodium chloride (NS) flush 5-10 mL  5-10 mL IntraVENous PRN    acetaminophen (TYLENOL) tablet 650 mg  650 mg Oral Q6H    acetaminophen (TYLENOL) tablet 650 mg  650 mg Oral Q6H PRN    naloxone (NARCAN) injection 0.4 mg  0.4 mg IntraVENous PRN    hydrOXYzine HCl (ATARAX) tablet 10 mg  10 mg Oral Q8H PRN    senna-docusate (PERICOLACE) 8.6-50 mg per tablet 1 Tab  1 Tab Oral BID    polyethylene glycol (MIRALAX) packet 17 g  17 g Oral DAILY    bisacodyl (DULCOLAX) suppository 10 mg  10 mg Rectal DAILY PRN    amLODIPine (NORVASC) tablet 5 mg  5 mg Oral DAILY    levothyroxine (SYNTHROID) tablet 25 mcg  25 mcg Oral ACB    simvastatin (ZOCOR) tablet 40 mg 40 mg Oral QHS    oxyCODONE IR (ROXICODONE) tablet 5 mg  5 mg Oral Q3H PRN    oxyCODONE IR (ROXICODONE) tablet 2.5 mg  2.5 mg Oral Q3H PRN        Objective:   Vitals:  Visit Vitals    /65    Pulse 70    Temp 98.7 °F (37.1 °C)    Resp 16    Ht 5' (1.524 m)    Wt 138 lb 0.1 oz (62.6 kg)    SpO2 95%    BMI 26.95 kg/m2     Temp (24hrs), Av.6 °F (37 °C), Min:98 °F (36.7 °C), Max:98.9 °F (37.2 °C)    O2 Flow Rate (L/min): 1 l/min O2 Device: Room air    Last 24hr Input/Output:    Intake/Output Summary (Last 24 hours) at 07/15/18 4101  Last data filed at 18 1926   Gross per 24 hour   Intake              480 ml   Output              250 ml   Net              230 ml        PHYSICAL EXAM:  General:    Alert, cooperative, no distress, appears stated age. Head:   Normocephalic, without obvious abnormality, atraumatic. Eyes:   Conjunctivae/corneas clear. PERRLA  Nose:  Nares normal. No drainage or sinus tenderness. .  Lungs:   Clear to auscultation bilaterally. No Wheezing or Rhonchi. No rales. Heart:   Regular rate and rhythm,  no murmur, rub or gallop. Abdomen:   Soft, non-tender. Not distended. Bowel sounds normal. No masses  . Lab Data Reviewed:    Recent Labs      18   0618   WBC  7.8   --    HGB  8.5*  7.1*   HCT  26.1*   --    PLT  121*   --      Recent Labs      07/15/18   0234  18   0627  18   0621   NA   --   143   --    K   --   3.1*   --    CL   --   111*   --    CO2   --   22   --    GLU   --   93   --    BUN   --   8   --    CREA   --   0.55   --    CA   --   8.1*   --    INR  1.3*  1.3*  1.4*     Lab Results   Component Value Date/Time    Glucose (POC) 86 2018 10:23 AM     No results for input(s): PH, PCO2, PO2, HCO3, FIO2 in the last 72 hours.   Recent Labs      07/15/18   0234  18   0627  18   0621   INR  1.3*  1.3*  1.4* ___________________________________________________  ___________________________________________________    Attending Physician: Del Medin, MD

## 2018-07-15 NOTE — PROGRESS NOTES
Spiritual Care Partner Volunteer visited patient in Rm 565 on 7/15/18. Documented by:   Chaplain Wheeler MDiv, MACE  [phone] PRAY ([phone])

## 2018-07-15 NOTE — PROGRESS NOTES
Problem: Mobility Impaired (Adult and Pediatric)  Goal: *Acute Goals and Plan of Care (Insert Text)  Physical Therapy Goals  Initiated 7/11/2018    1. Patient will move from supine to sit and sit to supine  in bed with modified independence within 4 days. 2. Patient will perform sit to stand with modified independence within 4 days. 3. Patient will ambulate with supervision/set-up for 200 feet with the least restrictive device within 4 days. 4. Patient will ascend/descend 3 stairs with  handrail(s) with supervision/set-up within 4 days. 5. Patient will perform  home exercise program per protocol with supervision/set-up within 4 days. physical Therapy TREATMENT  Patient: Samuel Shepherd (22 y.o. female)  Date: 7/15/2018  Diagnosis: DEGENERATIVE JOINT DISEASE LEFT HIP  Primary osteoarthritis of left hip Primary osteoarthritis of left hip  Procedure(s) (LRB):  LEFT TOTAL HIP REPLACEMENT (Left) 4 Days Post-Op  Precautions: Fall, WBAT  Chart, physical therapy assessment, plan of care and goals were reviewed. ASSESSMENT:  Pt was in bed. She was able to move her LLE over to EOB using a gait belt. She was aware of her 50% WB precautions. She ambulated to the door then reported she needed to sit on BSC. Pt set up on [facility] in room with phone and call bell in reach. Colletta Babcock, RN notified that she was on the [facility]. Progression toward goals:  [x]    Improving appropriately and progressing toward goals  []    Improving slowly and progressing toward goals  []    Not making progress toward goals and plan of care will be adjusted     PLAN:  Patient continues to benefit from skilled intervention to address the above impairments. Continue treatment per established plan of care. Discharge Recommendations:  [facility]il[facility]  Further Equipment Recommendations for Discharge:  Rollator? SUBJECTIVE:   Patient stated I am ready to go.     OBJECTIVE DATA SUMMARY:   Critical Behavior:  Neurologic State: Alert  Orientation Level: Oriented X4  Cognition: Appropriate decision making, Appropriate for age attention/concentration, Appropriate safety awareness, Follows commands  Safety/Judgement: Good awareness of safety precautions  Functional Mobility Training:  Bed Mobility:     Supine to Sit: Minimum assistance (pt used gait belt to move her LLE over to EOB)     Scooting: Contact guard assistance        Transfers:  Sit to Stand: Contact guard assistance           Bed to Chair: Contact guard assistance                    Balance:  Sitting: Intact  Standing: Impaired; With support  Ambulation/Gait Training:  Distance (ft): 20 Feet (ft)  Assistive Device: Gait belt;Walker, rolling  Ambulation - Level of Assistance: Contact guard assistance                                             Pain:  Pain Scale 1: Numeric (0 - 10)  Pain Intensity 1: 8  Pain Location 1: Hip  Pain Orientation 1: Left        Activity Tolerance:   fair  Please refer to the flowsheet for vital signs taken during this treatment.   After treatment:   [x]    Patient left in no apparent distress sitting up in [facility]  []    Patient left in no apparent distress in bed  [x]    Call bell left within reach  [x]    Nursing notified  []    Caregiver present  []    Bed alarm activated    COMMUNICATION/COLLABORATION:   The patients plan of care was discussed with: Registered Nurse    Thee Solomon, PT

## 2018-07-15 NOTE — ROUTINE PROCESS
Bedside shift change report given to SHIKHA Mercedes (oncoming nurse) by Ruthie Pat RN(offgoing nurse). Report given with SBAR.

## 2018-07-15 NOTE — PROGRESS NOTES
Problem: Mobility Impaired (Adult and Pediatric)  Goal: *Acute Goals and Plan of Care (Insert Text)  Physical Therapy Goals  Initiated 7/11/2018    1. Patient will move from supine to sit and sit to supine  in bed with modified independence within 4 days. 2. Patient will perform sit to stand with modified independence within 4 days. 3. Patient will ambulate with supervision/set-up for 200 feet with the least restrictive device within 4 days. 4. Patient will ascend/descend 3 stairs with  handrail(s) with supervision/set-up within 4 days. 5. Patient will perform  home exercise program per protocol with supervision/set-up within 4 days. physical Therapy TREATMENT  Patient: Meghann Hedrick (64 y.o. female)  Date: 7/15/2018  Diagnosis: DEGENERATIVE JOINT DISEASE LEFT HIP  Primary osteoarthritis of left hip Primary osteoarthritis of left hip  Procedure(s) (LRB):  LEFT TOTAL HIP REPLACEMENT (Left) 4 Days Post-Op  Precautions: Fall, WBAT  Chart, physical therapy assessment, plan of care and goals were reviewed. ASSESSMENT:  Pt was in bed after seeing ortho PA. Pt was able to get her LLE off the bed using a gait belt. She was more efficient this time as well. While her distance did not change, her speed improved. She requested to sit on [facility] after. [facility] RN was notified. Progression toward goals:  []    Improving appropriately and progressing toward goals  [x]    Improving slowly and progressing toward goals  []    Not making progress toward goals and plan of care will be adjusted     PLAN:  Patient continues to benefit from skilled intervention to address the above impairments. Continue treatment per established plan of care. Discharge Recommendations:  Amilcar Murphy  Further Equipment Recommendations for Discharge:  rolling walker     SUBJECTIVE:   Patient stated Shad Amado.     OBJECTIVE DATA SUMMARY:   Critical Behavior:  Neurologic State: Alert, Appropriate for age  Orientation Level: Oriented X4  Cognition: Appropriate decision making, Appropriate for age attention/concentration, Appropriate safety awareness, Follows commands  Safety/Judgement: Good awareness of safety precautions  Functional Mobility Training:  Bed Mobility:     Supine to Sit: Supervision     Scooting: Supervision        Transfers:  Sit to Stand: Contact guard assistance           Bed to Chair: Contact guard assistance                    Balance:  Sitting: Intact  Standing: Intact; With support  Ambulation/Gait Training:  Distance (ft): 20 Feet (ft)  Assistive Device: Gait belt;Walker, rolling  Ambulation - Level of Assistance: Contact guard assistance                                             Pain:  Pain Scale 1: Numeric (0 - 10)  Pain Intensity 1: 6  Pain Location 1: Hip  Pain Orientation 1: Left  Pain Description 1: Aching;Dull  Pain Intervention(s) 1: Medication (see MAR)  Activity Tolerance:   fair  Please refer to the flowsheet for vital signs taken during this treatment.   After treatment:   [x]    Patient left in no apparent distress sitting up on [facility]  []    Patient left in no apparent distress in bed  [x]    Call bell left within reach  [x]    Nursing notified  []    Caregiver present  []    Bed alarm activated    COMMUNICATION/COLLABORATION:   The patients plan of care was discussed with: Registered Nurse    Marie Pereira, PT   Time Calculation: 13 mins

## 2018-07-16 LAB
ANION GAP SERPL CALC-SCNC: 10 MMOL/L (ref 5–15)
BASOPHILS # BLD: 0 K/UL (ref 0–0.1)
BASOPHILS NFR BLD: 1 % (ref 0–1)
BUN SERPL-MCNC: 12 MG/DL (ref 6–20)
BUN/CREAT SERPL: 18 (ref 12–20)
CALCIUM SERPL-MCNC: 8.4 MG/DL (ref 8.5–10.1)
CHLORIDE SERPL-SCNC: 109 MMOL/L (ref 97–108)
CO2 SERPL-SCNC: 24 MMOL/L (ref 21–32)
CREAT SERPL-MCNC: 0.66 MG/DL (ref 0.55–1.02)
DIFFERENTIAL METHOD BLD: ABNORMAL
EOSINOPHIL # BLD: 0.1 K/UL (ref 0–0.4)
EOSINOPHIL NFR BLD: 2 % (ref 0–7)
ERYTHROCYTE [DISTWIDTH] IN BLOOD BY AUTOMATED COUNT: 14.3 % (ref 11.5–14.5)
GLUCOSE SERPL-MCNC: 103 MG/DL (ref 65–100)
HCT VFR BLD AUTO: 27.1 % (ref 35–47)
HGB BLD-MCNC: 8.6 G/DL (ref 11.5–16)
IMM GRANULOCYTES # BLD: 0 K/UL (ref 0–0.04)
IMM GRANULOCYTES NFR BLD AUTO: 0 % (ref 0–0.5)
INR PPP: 1.1 (ref 0.9–1.1)
LYMPHOCYTES # BLD: 0.9 K/UL (ref 0.8–3.5)
LYMPHOCYTES NFR BLD: 18 % (ref 12–49)
MCH RBC QN AUTO: 29.4 PG (ref 26–34)
MCHC RBC AUTO-ENTMCNC: 31.7 G/DL (ref 30–36.5)
MCV RBC AUTO: 92.5 FL (ref 80–99)
MONOCYTES # BLD: 0.5 K/UL (ref 0–1)
MONOCYTES NFR BLD: 11 % (ref 5–13)
NEUTS SEG # BLD: 3.2 K/UL (ref 1.8–8)
NEUTS SEG NFR BLD: 67 % (ref 32–75)
NRBC # BLD: 0 K/UL (ref 0–0.01)
NRBC BLD-RTO: 0 PER 100 WBC
PLATELET # BLD AUTO: 181 K/UL (ref 150–400)
PMV BLD AUTO: 10.4 FL (ref 8.9–12.9)
POTASSIUM SERPL-SCNC: 3.2 MMOL/L (ref 3.5–5.1)
PROTHROMBIN TIME: 11.5 SEC (ref 9–11.1)
RBC # BLD AUTO: 2.93 M/UL (ref 3.8–5.2)
SODIUM SERPL-SCNC: 143 MMOL/L (ref 136–145)
WBC # BLD AUTO: 4.8 K/UL (ref 3.6–11)

## 2018-07-16 PROCEDURE — 85025 COMPLETE CBC W/AUTO DIFF WBC: CPT | Performed by: INTERNAL MEDICINE

## 2018-07-16 PROCEDURE — 97535 SELF CARE MNGMENT TRAINING: CPT

## 2018-07-16 PROCEDURE — 74011250637 HC RX REV CODE- 250/637: Performed by: NURSE PRACTITIONER

## 2018-07-16 PROCEDURE — 80048 BASIC METABOLIC PNL TOTAL CA: CPT | Performed by: INTERNAL MEDICINE

## 2018-07-16 PROCEDURE — 85610 PROTHROMBIN TIME: CPT | Performed by: PHYSICIAN ASSISTANT

## 2018-07-16 PROCEDURE — 36415 COLL VENOUS BLD VENIPUNCTURE: CPT | Performed by: PHYSICIAN ASSISTANT

## 2018-07-16 PROCEDURE — 74011250637 HC RX REV CODE- 250/637: Performed by: INTERNAL MEDICINE

## 2018-07-16 PROCEDURE — 97110 THERAPEUTIC EXERCISES: CPT

## 2018-07-16 PROCEDURE — 65270000029 HC RM PRIVATE

## 2018-07-16 PROCEDURE — 94760 N-INVAS EAR/PLS OXIMETRY 1: CPT

## 2018-07-16 PROCEDURE — 74011250637 HC RX REV CODE- 250/637: Performed by: PHYSICIAN ASSISTANT

## 2018-07-16 PROCEDURE — 97116 GAIT TRAINING THERAPY: CPT

## 2018-07-16 RX ORDER — POTASSIUM CHLORIDE 750 MG/1
40 TABLET, FILM COATED, EXTENDED RELEASE ORAL
Status: COMPLETED | OUTPATIENT
Start: 2018-07-16 | End: 2018-07-16

## 2018-07-16 RX ADMIN — Medication 5 ML: at 14:00

## 2018-07-16 RX ADMIN — POLYETHYLENE GLYCOL 3350 17 G: 17 POWDER, FOR SOLUTION ORAL at 08:30

## 2018-07-16 RX ADMIN — Medication 10 ML: at 06:43

## 2018-07-16 RX ADMIN — OXYCODONE HYDROCHLORIDE 5 MG: 5 TABLET ORAL at 08:30

## 2018-07-16 RX ADMIN — ACETAMINOPHEN 650 MG: 325 TABLET ORAL at 06:43

## 2018-07-16 RX ADMIN — RIVAROXABAN 10 MG: 10 TABLET, FILM COATED ORAL at 17:19

## 2018-07-16 RX ADMIN — OXYCODONE HYDROCHLORIDE 5 MG: 5 TABLET ORAL at 16:52

## 2018-07-16 RX ADMIN — SIMVASTATIN 40 MG: 40 TABLET, FILM COATED ORAL at 21:44

## 2018-07-16 RX ADMIN — LEVOTHYROXINE SODIUM 25 MCG: 25 TABLET ORAL at 06:43

## 2018-07-16 RX ADMIN — Medication 10 ML: at 22:00

## 2018-07-16 RX ADMIN — ACETAMINOPHEN 650 MG: 325 TABLET ORAL at 12:35

## 2018-07-16 RX ADMIN — AMLODIPINE BESYLATE 5 MG: 5 TABLET ORAL at 09:28

## 2018-07-16 RX ADMIN — STANDARDIZED SENNA CONCENTRATE AND DOCUSATE SODIUM 1 TABLET: 8.6; 5 TABLET, FILM COATED ORAL at 08:30

## 2018-07-16 RX ADMIN — POTASSIUM CHLORIDE 40 MEQ: 750 TABLET, EXTENDED RELEASE ORAL at 09:30

## 2018-07-16 RX ADMIN — ACETAMINOPHEN 650 MG: 325 TABLET ORAL at 17:19

## 2018-07-16 NOTE — PROGRESS NOTES
Problem: Falls - Risk of  Goal: *Absence of Falls  Document Alberto Fall Risk and appropriate interventions in the flowsheet.    Outcome: Progressing Towards Goal  Fall Risk Interventions:  Mobility Interventions: Bed/chair exit alarm, Communicate number of staff needed for ambulation/transfer, Mechanical lift, OT consult for ADLs, Patient to call before getting OOB, PT Consult for mobility concerns, PT Consult for assist device competence         Medication Interventions: Evaluate medications/consider consulting pharmacy, Patient to call before getting OOB, Teach patient to arise slowly, Utilize gait belt for transfers/ambulation    Elimination Interventions: Call light in reach, Patient to call for help with toileting needs, Toilet paper/wipes in reach, Toileting schedule/hourly rounds    History of Falls Interventions: Utilize gait belt for transfer/ambulation, Room close to nurse's station

## 2018-07-16 NOTE — PROGRESS NOTES
Bedside and Verbal shift change report given to Pa Nye RN (oncoming nurse) by Lauro Nunn RN (offgoing nurse). Report included the following information SBAR, Kardex and MAR.

## 2018-07-16 NOTE — PROGRESS NOTES
Problem: Hip Replacement: Post-Op Day 2  Goal: Respiratory  Outcome: Progressing Towards Goal  Patient on room air and able to use her incentive spirometer independently

## 2018-07-16 NOTE — PROGRESS NOTES
Bedside and Verbal shift change report given to Darryl pastor RN (oncoming nurse) by Paresh Casey RN (offgoing nurse). Report included the following information SBAR, Kardex, Intake/Output, MAR and Recent Results.

## 2018-07-16 NOTE — PROGRESS NOTES
CM received notification that patient has been accepted at [facility]. Insurance [facility] is pending.     Malissa Soto, BSW/CRM

## 2018-07-16 NOTE — PROGRESS NOTES
Bedside and Verbal shift change report given to Darline Salas RN (oncoming nurse) by [facility], RN (offgoing nurse). Report included the following information SBAR, Kardex and MAR.

## 2018-07-16 NOTE — PROGRESS NOTES
TOTAL HIP PROGRESS NOTE      Subjective:     Post-Operative Day: 5 Status Post left Total Hip Arthroplasty  Systemic or Specific Complaints:No Complaints    Objective:     Patient Vitals for the past 24 hrs:   BP Temp Pulse Resp SpO2 Weight   07/16/18 0703 - - - - - 63.4 kg (139 lb 12.4 oz)   07/16/18 0355 133/71 99.1 °F (37.3 °C) 73 16 95 % -   07/15/18 2046 137/63 99.6 °F (37.6 °C) 74 15 93 % -   07/15/18 1421 104/59 99.2 °F (37.3 °C) 83 17 95 % -   07/15/18 1016 113/73 98 °F (36.7 °C) 82 14 98 % -       General: alert, cooperative, no distress, appears stated age   Wound: Wound clean and dry no evidence of infection. , No Erythema, No Edema, No Drainage and Wound Intact   Motion: Painless Range of Motion   DVT Exam: No evidence of DVT seen on physical exam.  Negative Braydon's sign. No cords or calf tenderness. No significant calf/ankle edema. Data Review:    Recent Results (from the past 24 hour(s))   PROTHROMBIN TIME + INR    Collection Time: 07/16/18  6:40 AM   Result Value Ref Range    INR 1.1 0.9 - 1.1      Prothrombin time 11.5 (H) 9.0 - 11.1 sec   CBC WITH AUTOMATED DIFF    Collection Time: 07/16/18  6:40 AM   Result Value Ref Range    WBC 4.8 3.6 - 11.0 K/uL    RBC 2.93 (L) 3.80 - 5.20 M/uL    HGB 8.6 (L) 11.5 - 16.0 g/dL    HCT 27.1 (L) 35.0 - 47.0 %    MCV 92.5 80.0 - 99.0 FL    MCH 29.4 26.0 - 34.0 PG    MCHC 31.7 30.0 - 36.5 g/dL    RDW 14.3 11.5 - 14.5 %    PLATELET 762 174 - 811 K/uL    MPV 10.4 8.9 - 12.9 FL    NRBC 0.0 0  WBC    ABSOLUTE NRBC 0.00 0.00 - 0.01 K/uL    NEUTROPHILS 67 32 - 75 %    LYMPHOCYTES 18 12 - 49 %    MONOCYTES 11 5 - 13 %    EOSINOPHILS 2 0 - 7 %    BASOPHILS 1 0 - 1 %    IMMATURE GRANULOCYTES 0 0.0 - 0.5 %    ABS. NEUTROPHILS 3.2 1.8 - 8.0 K/UL    ABS. LYMPHOCYTES 0.9 0.8 - 3.5 K/UL    ABS. MONOCYTES 0.5 0.0 - 1.0 K/UL    ABS. EOSINOPHILS 0.1 0.0 - 0.4 K/UL    ABS. BASOPHILS 0.0 0.0 - 0.1 K/UL    ABS. IMM.  GRANS. 0.0 0.00 - 0.04 K/UL    DF AUTOMATED     METABOLIC PANEL, BASIC    Collection Time: 07/16/18  6:40 AM   Result Value Ref Range    Sodium 143 136 - 145 mmol/L    Potassium 3.2 (L) 3.5 - 5.1 mmol/L    Chloride 109 (H) 97 - 108 mmol/L    CO2 24 21 - 32 mmol/L    Anion gap 10 5 - 15 mmol/L    Glucose 103 (H) 65 - 100 mg/dL    BUN 12 6 - 20 MG/DL    Creatinine 0.66 0.55 - 1.02 MG/DL    BUN/Creatinine ratio 18 12 - 20      GFR est AA >60 >60 ml/min/1.73m2    GFR est non-AA >60 >60 ml/min/1.73m2    Calcium 8.4 (L) 8.5 - 10.1 MG/DL         Assessment:     Status Post left Total Hip Arthroplasty. Doing well postoperatively. .  Bandage aquacell, light drainage noted. Labs stable; hgb improved to 8.6. PT progressing slowly. Pain control WNL. Plan:     Continues current post-op course  Continue PT per protocol. Patient would benefit from inpatient rehab; appears awaiting approval.   Will follow for further discharge specifics.

## 2018-07-16 NOTE — PROGRESS NOTES
Problem: Mobility Impaired (Adult and Pediatric)  Goal: *Acute Goals and Plan of Care (Insert Text)  Physical Therapy Goals  Initiated 7/11/2018    1. Patient will move from supine to sit and sit to supine  in bed with modified independence within 4 days. 2. Patient will perform sit to stand with modified independence within 4 days. 3. Patient will ambulate with supervision/set-up for 200 feet with the least restrictive device within 4 days. 4. Patient will ascend/descend 3 stairs with  handrail(s) with supervision/set-up within 4 days. 5. Patient will perform  home exercise program per protocol with supervision/set-up within 4 days. Physical Therapy Goals  Initiated 7/16/2018    1. Patient will move from supine to sit and sit to supine , scoot up and down and roll side to side in bed with modified independence within 4 days. 2. Patient will perform sit to stand with modified independence within 4 days. 3. Patient will ambulate with supervision/set-up for 100 feet with the least restrictive device within 4 days. 4. Patient will ascend/descend 3 stairs with handrail(s) with supervision/set-up within 4 days. 5. Patient will perform home exercise program per protocol with supervision/set-up within 4 days. physical Therapy RE-Assessment  Patient: Karthikeyan Villalobos (66 y.o. female)  Date: 7/16/2018  Primary Diagnosis: DEGENERATIVE JOINT DISEASE LEFT HIP  Primary osteoarthritis of left hip  Procedure(s) (LRB):  LEFT TOTAL HIP REPLACEMENT (Left) 5 Days Post-Op   Precautions: Fall, PWB (50%)  Chart, physical therapy assessment, plan of care and goals were reviewed. ASSESSMENT :  Based on the objective data described below, the patient presents with impaired functional mobility, pain, and limited ROM secondary to L THR POD 5. Patient received supine in bed, alert and agreeable to therapy; very pleasant woman, reported she has been up to use the [facility] already today.  Patient performed supine exercises, demonstrating fair strength and ROM. Patient educated in avoiding resting hip in ER by keeping toes pointed up. Patient performed safe bed mobility using gait belt for L LE management. Patient demonstrated good sitting balance at EOB, complaining of some dizziness that subsided with time and ankle pumps and VSS. Patient performed safe sit <> stand, requiring cues for hand placement. Patient demonstrated good standing balance, but staggered and grabbed for RW during dynamic balance exercise. Patient ambulated about FDC to door before complaining of dizziness, patient instructed to back up to chair and performed safe stand <> sit; VSS in sitting. Patient performed seated exercise before session concluded. Patient continuing to make slow progress with PT, main barrier being pain tolerance/anxiety, but motivated and agreeable to rehab; anticipate good progress with rehab, patient can tolerate 3 hours of therapy/day. Patient will benefit from skilled intervention to address the above impairments.   Patients rehabilitation potential is considered to be Good  Factors which may influence rehabilitation potential include:   []           None noted  []           Mental ability/status  []           Medical condition  []           Home/family situation and support systems  []           Safety awareness  [x]           Pain tolerance/management  []           Other:      PLAN :  Recommendations and Planned Interventions:  [x]             Bed Mobility Training             [x]      Neuromuscular Re-Education  [x]             Transfer Training                   []      Orthotic/Prosthetic Training  [x]             Gait Training                         []      Modalities  [x]             Therapeutic Exercises           [x]      Edema Management/Control  [x]             Therapeutic Activities            [x]      Patient and Family Training/Education  []             Other (comment):  Frequency/Duration: Patient will be followed by physical therapy daily to address goals. Discharge Recommendations: Inpatient Rehab  Further Equipment Recommendations for Discharge: TBD at rehab ([facility] and RW)     SUBJECTIVE:   Patient stated I was hyperventilating a few days ago; I think I was just trying to do too much.     OBJECTIVE DATA SUMMARY:     Past Medical History:   Diagnosis Date    Arthritis     Chronically on opiate therapy     Cigarette smoker     ct 6/25/13    COPD (chronic obstructive pulmonary disease) (HCC)     Elevated cholesterol     Epicondylitis elbow, medial, right     Hypertension     Hypothyroid     Left hip pain     Onychomycosis     Prediabetes 11/13/2014    S/P colonoscopy 12-12-13    cecal lipoma    S/P colonoscopy 12/12/2013    hemorrhoids,diverticulosis    Tubulovillous adenoma of colon 2008     Past Surgical History:   Procedure Laterality Date    HX BUNIONECTOMY Left 1978    HX BUNIONECTOMY Right 2011    HX COLONOSCOPY      [address] course since last seen and reason for reevaluation: patient making slow progress with PT due to pain, anxiety, low BP  Critical Behavior:  Neurologic State: Alert, Appropriate for age  Orientation Level: Oriented X4  Cognition: Appropriate for age attention/concentration, Appropriate decision making, Appropriate safety awareness, Follows commands  Safety/Judgement: Good awareness of safety precautions  Strength:    Strength: Generally decreased, functional  Tone & Sensation:   Tone: Normal  Sensation: Intact  Range Of Motion:  AROM: Generally decreased, functional  PROM: Generally decreased, functional  Coordination:  Coordination: Within functional limits  Functional Mobility:  Bed Mobility:  Supine to Sit: Supervision  Scooting: Supervision  Transfers:  Sit to Stand: Contact guard assistance  Stand to Sit: Contact guard assistance; Additional time  Balance:   Sitting: Intact  Standing: Intact  Standing - Static: Good  Standing - Dynamic : Fair  Ambulation/Gait Training:  Distance (ft): 6 Feet (ft)  Assistive Device: Gait belt;Walker, rolling  Ambulation - Level of Assistance: Contact guard assistance  Gait Abnormalities: Antalgic;Decreased step clearance; Step to gait  Left Side Weight Bearing: Partial (%)  Stance: Left decreased;Right increased  Speed/Loev: Pace decreased (<100 feet/min)  Step Length: Right shortened;Left shortened  Therapeutic Exercises:   Glute sets, quad sets, supine abduction (active assisted), heel slides (active assisted), LAQ    Functional Measure:  Tinetti test:    Sitting Balance: 1  Arises: 1  Attempts to Rise: 2  Immediate Standing Balance: 1  Standing Balance: 2  Nudged: 1  Eyes Closed: 1  Turn 360 Degrees - Continuous/Discontinuous: 0  Turn 360 Degrees - Steady/Unsteady: 0  Sitting Down: 1  Balance Score: 10  Indication of Gait: 1  R Step Length/Height: 0  L Step Length/Height: 0  R Foot Clearance: 1  L Foot Clearance: 1  Step Symmetry: 1  Step Continuity: 1  Path: 1  Trunk: 0  Walking Time: 1  Gait Score: 7  Total Score: 17       Tinetti Test and G-code impairment scale:  Percentage of Impairment CH    0%   CI    1-19% CJ    20-39% CK    40-59% CL    60-79% CM    80-99% CN     100%   Tinetti  Score 0-28 28 23-27 17-22 12-16 6-11 1-5 0       Tinetti Tool Score Risk of Falls  <19 = High Fall Risk  19-24 = Moderate Fall Risk  25-28 = Low Fall Risk  Tinetti ME. Performance-Oriented Assessment of Mobility Problems in Elderly Patients. Ferrell 66; [id number]. (Scoring Description: PT Bulletin Feb. 10, 1993)    Older adults: Alysa Solomon et al, 2009; n = 1000 [facility] elderly evaluated with ABC, JUSTUS, ADL, and IADL)  · Mean JUSTUS score for males aged 69-68 years = 26.21(3.40)  · Mean JUSTUS score for females age 69-68 years = 25.16(4.30)  · Mean JUSTUS score for males over 80 years = 23.29(6.02)  · Mean JUSTUS score for females over 80 years = 17.20(8.32)     G codes:   In compliance with CMSs Claims Based Outcome Reporting, the following G-code set was chosen for this patient based on their primary functional limitation being treated: The outcome measure chosen to determine the severity of the functional limitation was the Tinetti with a score of 17/28 which was correlated with the impairment scale. ? Mobility - Walking and Moving Around:     - CURRENT STATUS: CJ - 20%-39% impaired, limited or restricted    - GOAL STATUS: CI - 1%-19% impaired, limited or restricted    - D/C STATUS:  ---------------To be determined---------------     Pain:  Pain Scale 1: Visual  Pain Intervention(s) 1: Medication (see MAR)  Activity Tolerance:   Limited by fatigue, pain and anxiety. Please refer to the flowsheet for vital signs taken during this treatment. After treatment:   [x]  Patient left in no apparent distress sitting up in chair  []  Patient left in no apparent distress in bed  [x]  Call bell left within reach  [x]  Nursing notified  []  Caregiver present  []  Bed alarm activated    COMMUNICATION/EDUCATION:   The patients plan of care was discussed with: Registered Nurse. [x]  Fall prevention education was provided and the patient/caregiver indicated understanding. [x]  Patient/family have participated as able in goal setting and plan of care. [x]  Patient/family agree to work toward stated goals and plan of care. []  Patient understands intent and goals of therapy, but is neutral about his/her participation. []  Patient is unable to participate in goal setting and plan of care. Thank you for this referral.  Latrice Puga, SPT   Time Calculation: 24 mins    Regarding student involvement in patient care:  A student participated in this treatment session. Per CMS Medicare statements and APTA guidelines I certify that the following was true:  1. I was present and directly observed the entire session. 2. I made all skilled judgments and clinical decisions regarding care.   3. I am the practitioner responsible for assessment, treatment, and documentation.

## 2018-07-17 LAB
INR PPP: 1.2 (ref 0.9–1.1)
PROTHROMBIN TIME: 11.8 SEC (ref 9–11.1)

## 2018-07-17 PROCEDURE — 74011250637 HC RX REV CODE- 250/637: Performed by: NURSE PRACTITIONER

## 2018-07-17 PROCEDURE — 74011250637 HC RX REV CODE- 250/637: Performed by: PHYSICIAN ASSISTANT

## 2018-07-17 PROCEDURE — 77030012893

## 2018-07-17 PROCEDURE — 65270000029 HC RM PRIVATE

## 2018-07-17 PROCEDURE — 97116 GAIT TRAINING THERAPY: CPT

## 2018-07-17 PROCEDURE — 36415 COLL VENOUS BLD VENIPUNCTURE: CPT | Performed by: PHYSICIAN ASSISTANT

## 2018-07-17 PROCEDURE — 85610 PROTHROMBIN TIME: CPT | Performed by: PHYSICIAN ASSISTANT

## 2018-07-17 RX ADMIN — SIMVASTATIN 40 MG: 40 TABLET, FILM COATED ORAL at 21:53

## 2018-07-17 RX ADMIN — STANDARDIZED SENNA CONCENTRATE AND DOCUSATE SODIUM 1 TABLET: 8.6; 5 TABLET, FILM COATED ORAL at 09:16

## 2018-07-17 RX ADMIN — RIVAROXABAN 10 MG: 10 TABLET, FILM COATED ORAL at 17:10

## 2018-07-17 RX ADMIN — Medication 5 ML: at 14:00

## 2018-07-17 RX ADMIN — STANDARDIZED SENNA CONCENTRATE AND DOCUSATE SODIUM 1 TABLET: 8.6; 5 TABLET, FILM COATED ORAL at 17:10

## 2018-07-17 RX ADMIN — Medication 10 ML: at 06:00

## 2018-07-17 RX ADMIN — OXYCODONE HYDROCHLORIDE 5 MG: 5 TABLET ORAL at 12:47

## 2018-07-17 RX ADMIN — POLYETHYLENE GLYCOL 3350 17 G: 17 POWDER, FOR SOLUTION ORAL at 09:16

## 2018-07-17 RX ADMIN — ACETAMINOPHEN 650 MG: 325 TABLET ORAL at 17:10

## 2018-07-17 RX ADMIN — OXYCODONE HYDROCHLORIDE 5 MG: 5 TABLET ORAL at 05:39

## 2018-07-17 RX ADMIN — ACETAMINOPHEN 650 MG: 325 TABLET ORAL at 05:39

## 2018-07-17 RX ADMIN — ACETAMINOPHEN 650 MG: 325 TABLET ORAL at 12:47

## 2018-07-17 RX ADMIN — Medication 10 ML: at 22:00

## 2018-07-17 RX ADMIN — OXYCODONE HYDROCHLORIDE 5 MG: 5 TABLET ORAL at 09:16

## 2018-07-17 RX ADMIN — LEVOTHYROXINE SODIUM 25 MCG: 25 TABLET ORAL at 06:54

## 2018-07-17 NOTE — PROGRESS NOTES
Chart reviewed. CM received notification that patient's insurance has denied inpatient rehab and they are recommending SNF. CM met with patient and bedside, notified of insurance denial. CM discussed SNF options and provided SNF choice list. Patient stated that her sister will be here today and she will discuss SNF choices with her sister. CM will follow-up. Spoke with patient's sister Court Aguilar (#[phone]) via phone. [facility]. CM met with patient at bedside and confirmed that she prefers these facilities. CM sent referral to [facility] via Amilcar Lluvia. Insurance Marrian Neighbours will need to be obtained prior to discharge. 3:52 PM: [facility] has accepted patient and has started insurance auth.     INNA Lorenzo/CRM

## 2018-07-17 NOTE — PROGRESS NOTES
Bedside and Verbal shift change report given to Darryl pastor RN (oncoming nurse) by Carrie Pelletier RN (offgoing nurse). Report included the following information SBAR, Kardex, OR Summary, Procedure Summary, Intake/Output, MAR and Recent Results.

## 2018-07-17 NOTE — PROGRESS NOTES
Problem: Mobility Impaired (Adult and Pediatric)  Goal: *Acute Goals and Plan of Care (Insert Text)  Physical Therapy Goals  Initiated 7/11/2018    1. Patient will move from supine to sit and sit to supine  in bed with modified independence within 4 days. 2. Patient will perform sit to stand with modified independence within 4 days. 3. Patient will ambulate with supervision/set-up for 200 feet with the least restrictive device within 4 days. 4. Patient will ascend/descend 3 stairs with  handrail(s) with supervision/set-up within 4 days. 5. Patient will perform  home exercise program per protocol with supervision/set-up within 4 days. Physical Therapy Goals  Initiated 7/16/2018    1. Patient will move from supine to sit and sit to supine , scoot up and down and roll side to side in bed with modified independence within 4 days. 2. Patient will perform sit to stand with modified independence within 4 days. 3. Patient will ambulate with supervision/set-up for 100 feet with the least restrictive device within 4 days. 4. Patient will ascend/descend 3 stairs with handrail(s) with supervision/set-up within 4 days. 5. Patient will perform home exercise program per protocol with supervision/set-up within 4 days. physical Therapy TREATMENT  Patient: Chris Hurd (44 y.o. female)  Date: 7/17/2018  Diagnosis: DEGENERATIVE JOINT DISEASE LEFT HIP  Primary osteoarthritis of left hip Primary osteoarthritis of left hip  Procedure(s) (LRB):  LEFT TOTAL HIP REPLACEMENT (Left) 6 Days Post-Op  Precautions: Fall, PWB (50%)  Chart, physical therapy assessment, plan of care and goals were reviewed. ASSESSMENT:  Patient received supine in bed, agreeable to therapy. Reported she needed to use the restroom after gait. Able to complete supine exercises with min cues for technique. With cues to scoot to far R side of the bed in preparation for supine to sit, patient was able to complete bed mobility with supervision.  Gait improved with overall distance and walker, however with shuffling gait and very poor foot clearance on L LE. Early heel rise on R to propel L LE forward, as patient with great difficulty advancing L LE. Patient completed toileting on BSC in bathroom and min A to maintain standing balance while completing self hygiene. Returned to chair at end of session. Recommend SNF. Progression toward goals:  []      Improving appropriately and progressing toward goals  [x]      Improving slowly and progressing toward goals  []      Not making progress toward goals and plan of care will be adjusted     PLAN:  Patient continues to benefit from skilled intervention to address the above impairments. Continue treatment per established plan of care. Discharge Recommendations:  Skilled Nursing Facility  Further Equipment Recommendations for Discharge:  None if d/c to SNF     SUBJECTIVE:   Patient stated I been putting in but nothing has come out until now.     OBJECTIVE DATA SUMMARY:   Critical Behavior:  Neurologic State: Alert, Appropriate for age  Orientation Level: Oriented X4  Cognition: Appropriate decision making, Appropriate for age attention/concentration, Appropriate safety awareness, Follows commands  Safety/Judgement: Good awareness of safety precautions  Functional Mobility Training:  Bed Mobility:  Supine to Sit: Supervision; Additional time (verbal cues for technique)  Scooting: Modified independent  Transfers:  Sit to Stand: Minimum assistance (to steady upon standing)  Stand to Sit: Contact guard assistance (verbal cues for safety)  Balance:  Sitting: Intact  Standing: Impaired; With support  Standing - Static: Good  Standing - Dynamic : Fair  Ambulation/Gait Training:  Distance (ft): 25 Feet (ft) (+ 10 after toileting)  Assistive Device: Walker, rolling;Gait belt  Gait Abnormalities: Antalgic;Decreased step clearance;Shuffling gait; Early heel rise (on R to propel L LE forward)  Left Side Weight Bearing: Partial (%) (50%)  Stance: Left decreased  Speed/Love: Shuffled  Step Length: Right shortened;Left shortened  Therapeutic Exercises:   SUPINE  EXERCISES   Sets   Reps   Active Active Assist   Passive Self ROM   Comments   Ankle Pumps 1 10 [x]                                           []                                           []                                           []                                              Quad Sets 1 10 [x]                                           []                                           []                                           []                                              Heel Slides 1 10 []                                           [x]                                           []                                           []                                              Hip Abduction 1 10 []                                           [x]                                           []                                           []                                              Hip External Rotation 1 10 [x]                                           []                                           []                                           []                                              Glut Sets 1 10 [x]                                           []                                           []                                           []                                                  Pain:  Pain Scale 1: Numeric (0 - 10)  Pain Intensity 1: 7  Pain Location 1: Hip  Pain Orientation 1: Left  Pain Description 1: Aching  Pain Intervention(s) 1: Medication (see MAR); Rest  Activity Tolerance:   Improving, able to complete more activity, minimal c/o dizziness  Please refer to the flowsheet for vital signs taken during this treatment.   After treatment:   [x]  Patient left in no apparent distress sitting up in chair  []  Patient left in no apparent distress in bed  [x]  Call bell left within reach  [x]  Nursing notified  []  Caregiver present  []  Bed alarm activated    COMMUNICATION/COLLABORATION:   The patients plan of care was discussed with: Registered Nurse and     Isabell Grady, PT, DPT   Time Calculation: 19 mins

## 2018-07-17 NOTE — PROGRESS NOTES
Bedside and Verbal shift change report given to Rita Rehman RN (oncoming nurse) by Lawrence Denney RN (offgoing nurse). Report included the following information SBAR, Kardex and MAR.

## 2018-07-18 VITALS
RESPIRATION RATE: 16 BRPM | WEIGHT: 139.77 LBS | OXYGEN SATURATION: 98 % | TEMPERATURE: 98.1 F | BODY MASS INDEX: 27.44 KG/M2 | HEART RATE: 70 BPM | DIASTOLIC BLOOD PRESSURE: 67 MMHG | SYSTOLIC BLOOD PRESSURE: 106 MMHG | HEIGHT: 60 IN

## 2018-07-18 LAB
INR PPP: 1.3 (ref 0.9–1.1)
PROTHROMBIN TIME: 12.7 SEC (ref 9–11.1)

## 2018-07-18 PROCEDURE — 74011250637 HC RX REV CODE- 250/637: Performed by: INTERNAL MEDICINE

## 2018-07-18 PROCEDURE — 85610 PROTHROMBIN TIME: CPT | Performed by: PHYSICIAN ASSISTANT

## 2018-07-18 PROCEDURE — 74011250637 HC RX REV CODE- 250/637: Performed by: NURSE PRACTITIONER

## 2018-07-18 PROCEDURE — 74011250637 HC RX REV CODE- 250/637: Performed by: PHYSICIAN ASSISTANT

## 2018-07-18 PROCEDURE — 36415 COLL VENOUS BLD VENIPUNCTURE: CPT | Performed by: PHYSICIAN ASSISTANT

## 2018-07-18 RX ORDER — POTASSIUM CHLORIDE 750 MG/1
40 TABLET, FILM COATED, EXTENDED RELEASE ORAL
Status: COMPLETED | OUTPATIENT
Start: 2018-07-18 | End: 2018-07-18

## 2018-07-18 RX ADMIN — OXYCODONE HYDROCHLORIDE 5 MG: 5 TABLET ORAL at 07:02

## 2018-07-18 RX ADMIN — STANDARDIZED SENNA CONCENTRATE AND DOCUSATE SODIUM 1 TABLET: 8.6; 5 TABLET, FILM COATED ORAL at 09:43

## 2018-07-18 RX ADMIN — Medication 10 ML: at 07:03

## 2018-07-18 RX ADMIN — OXYCODONE HYDROCHLORIDE 5 MG: 5 TABLET ORAL at 10:18

## 2018-07-18 RX ADMIN — POTASSIUM CHLORIDE 40 MEQ: 750 TABLET, EXTENDED RELEASE ORAL at 09:43

## 2018-07-18 RX ADMIN — ACETAMINOPHEN 650 MG: 325 TABLET ORAL at 07:02

## 2018-07-18 RX ADMIN — ACETAMINOPHEN 650 MG: 325 TABLET ORAL at 00:45

## 2018-07-18 RX ADMIN — AMLODIPINE BESYLATE 5 MG: 5 TABLET ORAL at 09:43

## 2018-07-18 RX ADMIN — LEVOTHYROXINE SODIUM 25 MCG: 25 TABLET ORAL at 07:04

## 2018-07-18 RX ADMIN — OXYCODONE HYDROCHLORIDE 5 MG: 5 TABLET ORAL at 13:24

## 2018-07-18 RX ADMIN — ACETAMINOPHEN 650 MG: 325 TABLET ORAL at 11:50

## 2018-07-18 NOTE — PROGRESS NOTES
MARIA A received notification that [facility] has obtained insurance authorization. CM will arrange discharge. CM met with patient at bedside, she prefers to transport via stretcher. CM arranged stretcher transport with [facility]. They will be here at 1 PM to transport patient to [facility]. Patient is aware. CM called sister Venice Thomas (#[phone]) and notified her of discharge and transport time. Discharge folder located on hard chart to include ambulance form and discharge instructions. RN to follow with Riaz and MAR. RN to call report to #[phone].     INNA Carlisle/CRM

## 2018-07-18 NOTE — PROGRESS NOTES
Bedside and Verbal shift change report given to SHIKHA Stoner (oncoming nurse) by Abel Maria RN (offgoing nurse). Report included the following information SBAR, Kardex, OR Summary, Procedure Summary, Intake/Output, MAR and Recent Results.

## 2018-07-18 NOTE — PROGRESS NOTES
Hospital Progress Note    NAME:  Meghann Hedrick   :      MRN:  [id number]     Date/Time:  2018 8:35 AM    Plan:   1. Will follow at [facility]  2.  Encourage activity  Risk of Deterioration: Low  []           Moderate  [x]           High  []                 Assessment:   Principal Problem:    Primary osteoarthritis of left hip (2018)      S/p thr    Active Problems:    Hypertension ()      COPD (chronic obstructive pulmonary disease) (HCC) ()      Anemia (2018) transfuse 1 unit       Subjective:      feeling better   11 Point Review of Systems:   Negative except no cp    []            Unable to obtain ROS due to:       []            mental status change []            sedated []            intubated     Social History   Substance Use Topics    Smoking status: Light Tobacco Smoker     Packs/day: 0.50     Years: 40.00    Smokeless tobacco: Never Used    Alcohol use No      Comment: NONE IN PAST 5 YEARS     Medications reviewed:  Current Facility-Administered Medications   Medication Dose Route Frequency    famotidine (PEPCID) tablet 20 mg  20 mg Oral Q12H PRN    0.9% sodium chloride infusion 250 mL  250 mL IntraVENous PRN    rivaroxaban (XARELTO) tablet 10 mg  10 mg Oral DAILY WITH DINNER    sodium chloride (NS) flush 5-10 mL  5-10 mL IntraVENous Q8H    sodium chloride (NS) flush 5-10 mL  5-10 mL IntraVENous PRN    acetaminophen (TYLENOL) tablet 650 mg  650 mg Oral Q6H    acetaminophen (TYLENOL) tablet 650 mg  650 mg Oral Q6H PRN    naloxone (NARCAN) injection 0.4 mg  0.4 mg IntraVENous PRN    hydrOXYzine HCl (ATARAX) tablet 10 mg  10 mg Oral Q8H PRN    senna-docusate (PERICOLACE) 8.6-50 mg per tablet 1 Tab  1 Tab Oral BID    polyethylene glycol (MIRALAX) packet 17 g  17 g Oral DAILY    bisacodyl (DULCOLAX) suppository 10 mg  10 mg Rectal DAILY PRN    amLODIPine (NORVASC) tablet 5 mg  5 mg Oral DAILY    levothyroxine (SYNTHROID) tablet 25 mcg  25 mcg Oral ACB    simvastatin (ZOCOR) tablet 40 mg  40 mg Oral QHS    oxyCODONE IR (ROXICODONE) tablet 5 mg  5 mg Oral Q3H PRN    oxyCODONE IR (ROXICODONE) tablet 2.5 mg  2.5 mg Oral Q3H PRN        Objective:   Vitals:  Visit Vitals    /79 (BP 1 Location: Left arm, BP Patient Position: At rest)    Pulse 70    Temp 98.4 °F (36.9 °C)    Resp 15    Ht 5' (1.524 m)    Wt 139 lb 12.4 oz (63.4 kg)    SpO2 97%    BMI 27.3 kg/m2     Temp (24hrs), Av.2 °F (36.8 °C), Min:97.5 °F (36.4 °C), Max:98.5 °F (36.9 °C)    O2 Flow Rate (L/min): 1 l/min O2 Device: Room air    Last 24hr Input/Output:    Intake/Output Summary (Last 24 hours) at 18 0740  Last data filed at 18 1302   Gross per 24 hour   Intake              236 ml   Output                0 ml   Net              236 ml        PHYSICAL EXAM:  General:    Alert, cooperative, no distress, appears stated age. Head:   Normocephalic, without obvious abnormality, atraumatic. Eyes:   Conjunctivae/corneas clear. PERRLA  Nose:  Nares normal. No drainage or sinus tenderness. .  Lungs:   Clear to auscultation bilaterally. No Wheezing or Rhonchi. No rales. Heart:   Regular rate and rhythm,  no murmur, rub or gallop. Abdomen:   Soft, non-tender. Not distended. Bowel sounds normal. No masses  . Lab Data Reviewed:    Recent Labs      18   0640  07/15/18   0759   WBC  4.8  6.0   HGB  8.6*  8.3*   HCT  27.1*  25.2*   PLT  181  152     Recent Labs      18   0335  18   0533  18   0640  07/15/18   0759   NA   --    --   143  145   K   --    --   3.2*  3.2*   CL   --    --   109*  112*   CO2   --    --   24  24   GLU   --    --   103*  104*   BUN   --    --   12  12   CREA   --    --   0.66  0.62   CA   --    --   8.4*  8.2*   INR  1.3*  1.2*  1.1   --      Lab Results   Component Value Date/Time    Glucose (POC) 86 2018 10:23 AM     No results for input(s): PH, PCO2, PO2, HCO3, FIO2 in the last 72 hours.   Recent Labs      18   0335  18   0533 07/16/18   0640   INR  1.3*  1.2*  1.1     ___________________________________________________  ___________________________________________________    Attending Physician: Linnie Boxer, MD

## 2018-07-18 NOTE — PROGRESS NOTES
Report called to nurse Esther Hatch at [facility] per sbar. All belongings sent with patient in ambulance.

## 2018-07-18 NOTE — PROGRESS NOTES
Bedside shift change report given to Jaylon Johnson (oncoming nurse) by Hood Mann (offgoing nurse). Report included the following information SBAR, Kardex, Intake/Output and MAR.

## 2018-07-19 ENCOUNTER — PATIENT OUTREACH (OUTPATIENT)
Dept: INTERNAL MEDICINE CLINIC | Age: 78
End: 2018-07-19

## 2018-07-27 NOTE — DISCHARGE SUMMARY
Discharge Summary    Physician: Isabelle Lara MD    Physician Assistant: Moris Grace PA-C    Admit Diagnosis:  DEGENERATIVE JOINT DISEASE LEFT HIP  Primary osteoarthritis of left hip    Final Diagnosis:   1. Advanced degenerative arthritis of left hip . Procedure: Left total hip replacement    Complications: None. History of Present Illness: The patient has a long-standing history of pain within the left hip . The patient has severe degenerative arthritis of the left hip and has exhausted conservative therapy at this time including prior intra-articular injections, activity modifications, OTC NSAIDS. The patient has been limited in their ability to perform ADLs, walk short distances or climb steps appropriately. The patient presents for the above-mentioned procedure. The patient has been cleared from a medical and cardiac standpoint. Past Medical History:  Recorded in the chart. Physical Examination: Also recorded in the chart. The patient is noted for ambulating with an antalgic gait favoring the left side. Examination of the left hip reveals 80% of reduction to rotation of the right hip. Any attempts to rotate elicits pains. On the left, there is little to no ROM to rotation. Any attempts to rotate elicits severe pain. The cruciate and collateral ligaments are stable. No effusion. No sign of infection. No ecchymosis or erythema. No cellulitis or rash. No calf pain, no evidence of DVT. I detect no obvious motor or sensory deficits in the lower extremities. The extensor mechanism is intact. The neurovascular status is intact . X-rays reveal advanced degenerative changes of the right hip. Large degree of osteophyte formation. Total loss of joint space. No fracture. Course in the Hospital:  The patient was admitted to the hospital.  The Pt. Underwent a Left total hip replacement . Postoperatively, the pt. did well. The pt. Remained stable from a medical standpoint.   The patient ambulated, 50% WBAT weightbearing within the hospital with Physical Therapy. The patient continued with this therapy at [facility] with PT. The patient began taking Xarelto post-operatively for DVT prophylaxis and will continue on this for 35 days. At the time of discharge, there was no evidence of any DVT noted. The patient had negative Homans signs bilateral lower extremities. At the time of discharge, the patient's left hip incision appeared with staples intact. No signs of infection or inflammation noted. The patient will follow up in our office in 2-1/2 weeks. DC med list:  Discharge Medication List as of 7/18/2018  2:33 PM      START taking these medications    Details   oxyCODONE IR (ROXICODONE) 5 mg immediate release tablet Take 1 Tab by mouth every four (4) hours as needed for Pain. Max Daily Amount: 30 mg., Print, Disp-60 Tab, R-0      rivaroxaban (XARELTO) 10 mg tablet Take 1 Tab by mouth daily (with lunch) for 35 days. Indications: hip surgery deep vein thrombosis prevention, Print, Disp-35 Tab, R-0         CONTINUE these medications which have NOT CHANGED    Details   amLODIPine (NORVASC) 5 mg tablet take 1 tablet by mouth once daily, Normal, Disp-30 Tab, R-11      celecoxib (CELEBREX) 200 mg capsule take 1 capsule by mouth twice a day PRN, Normal, Disp-60 Cap, R-6      simvastatin (ZOCOR) 40 mg tablet take 1 tablet by mouth NIGHTLY, Normal, Disp-30 Tab, R-11      levothyroxine (SYNTHROID) 25 mcg tablet take 1 tablet by mouth once daily BEFORE BREAKFAST, Normal, Disp-30 Tab, R-11      lidocaine (LIDODERM) 5 %(700 mg/patch) 1 patch by TransDERmal route every twenty-four (24) hours.  Apply affected area for 12 hours a day and remove for 12 hours a day., Normal, Disp-1 Package, R-11         STOP taking these medications       aspirin delayed-release 81 mg tablet Comments:   Reason for Stopping:               Patient Disposition: Amilcar Murphy ([facility])  Followup Care:  Discharge Condition: good  PT/OT Eval and Treat  Regular Diet  As directed    Follow-up Information     Follow up With Details [address], [place]   [facility] 9  [phone]      Magali Diehl [facility]   [address] [facility] [address] [address]  [phone]                    IRENE KeyesC

## 2018-10-01 ENCOUNTER — OFFICE VISIT (OUTPATIENT)
Dept: INTERNAL MEDICINE CLINIC | Age: 78
End: 2018-10-01

## 2018-10-01 VITALS
DIASTOLIC BLOOD PRESSURE: 72 MMHG | WEIGHT: 129.6 LBS | HEART RATE: 66 BPM | SYSTOLIC BLOOD PRESSURE: 120 MMHG | RESPIRATION RATE: 18 BRPM | OXYGEN SATURATION: 96 % | HEIGHT: 60 IN | TEMPERATURE: 97.8 F | BODY MASS INDEX: 25.45 KG/M2

## 2018-10-01 DIAGNOSIS — G89.29 TOE PAIN, CHRONIC, RIGHT: ICD-10-CM

## 2018-10-01 DIAGNOSIS — J44.9 CHRONIC OBSTRUCTIVE PULMONARY DISEASE, UNSPECIFIED COPD TYPE (HCC): Primary | ICD-10-CM

## 2018-10-01 DIAGNOSIS — I10 ESSENTIAL HYPERTENSION: ICD-10-CM

## 2018-10-01 DIAGNOSIS — M79.674 TOE PAIN, CHRONIC, RIGHT: ICD-10-CM

## 2018-10-01 DIAGNOSIS — R73.03 PREDIABETES: ICD-10-CM

## 2018-10-01 DIAGNOSIS — F17.210 CIGARETTE SMOKER: ICD-10-CM

## 2018-10-01 NOTE — MR AVS SNAPSHOT
[address] [facility] 
 
 
 [address] [address] 
[phone] Patient: Donal Bumpers MRN: [id number] [facility]:[birth year] Visit Information Date & Time Provider Department Dept. Phone Encounter #  
 10/1/2018 10:30 AM Gerry Enamorado MD [facility] Sports Medicine and Primary Care [phone] [id number] Follow-up Instructions Return in about 4 months (around 2/1/2019). Follow-up and Disposition History Your Appointments 1/7/2019  9:15 AM  
Any with Gerry Enamorado MD  
[address] [place] [address] and Primary Care [address] [place] [address]) Appt Note: 3 Month Follow Up  
 [address] [address] Upcoming Health Maintenance Date Due Shingrix Vaccine Age 50> (1 of 2) 10/1/2019* Influenza Age 5 to Adult 10/1/2019* MEDICARE YEARLY EXAM 5/22/2019 GLAUCOMA SCREENING Q2Y 2/7/2020 DTaP/Tdap/Td series (2 - Td) 12/15/2026 *Topic was postponed. The date shown is not the original due date. Allergies as of 10/1/2018  Review Complete On: 10/1/2018 By: Gerry Enamorado MD  
  
 Severity Noted Reaction Type Reactions Lisinopril  12/12/2013   Systemic Hives, Swelling Current Immunizations  Never Reviewed No immunizations on file. Not reviewed this visit You Were Diagnosed With   
  
 Codes Comments Chronic obstructive pulmonary disease, unspecified COPD type ([facility]    -  Primary ICD-10-CM: J44.9 ICD-9-CM: 529 Essential hypertension     ICD-10-CM: I10 
ICD-9-CM: 401.9 Cigarette smoker     ICD-10-CM: F17.210 ICD-9-CM: 305.1 Prediabetes     ICD-10-CM: R73.03 
ICD-9-CM: 790.29 Toe pain, chronic, right     ICD-10-CM: M79.674, K06.61 ICD-9-CM: 729.5, 338.29 Vitals BP Pulse Temp Resp Height(growth percentile) Weight(growth percentile)  120/72 66 97.8 °F (36.6 °C) (Oral) 18 5' (1.524 m) 129 lb 9.6 oz (58.8 kg)  
 SpO2 BMI OB Status Smoking Status 96% 25.31 kg/m2 Postmenopausal Light Tobacco Smoker Vitals History BMI and BSA Data Body Mass Index Body Surface Area  
 25.31 kg/m 2 1.58 m 2 Preferred Pharmacy Pharmacy Name Phone [facility] [address] [phone] Your Updated Medication List  
  
   
This list is accurate as of 10/1/18  1:14 PM.  Always use your most recent med list. amLODIPine 5 mg tablet Commonly known as:  NORVASC  
take 1 tablet by mouth once daily  
  
 celecoxib 200 mg capsule Commonly known as:  CELEBREX  
take 1 capsule by mouth twice a day PRN  
  
 levothyroxine 25 mcg tablet Commonly known as:  SYNTHROID  
take 1 tablet by mouth once daily BEFORE BREAKFAST  
  
 lidocaine 5 % Commonly known as:  Mely Hilario 1 patch by TransDERmal route every twenty-four (24) hours. Apply affected area for 12 hours a day and remove for 12 hours a day. simvastatin 40 mg tablet Commonly known as:  ZOCOR  
take 1 tablet by mouth NIGHTLY We Performed the Following METABOLIC PANEL, BASIC [49292 CPT(R)] REFERRAL TO PODIATRY [REF90 Custom] Comments:  
 Please evaluate patient for pain. Follow-up Instructions Return in about 4 months (around 2/1/2019). Referral Information Referral ID Referred By Referred To  
  
 [id number] Refugio Campos, [address] [place] [address] At [facility] [address] Phone: [phone] Fax: [phone] Visits Status Start Date End Date 1 New Request 10/1/18 10/1/19 If your referral has a status of pending review or denied, additional information will be sent to support the outcome of this decision. Introducing [facility] [place] [facility]!    
 [facility] introduces [employer] patient portal. Now you can access parts of your medical record, email your doctor's office, and request medication refills online. 1. In your internet browser, go to [email or web address] 2. Click on the First Time User? Click Here link in the Sign In box. You will see the New Member Sign Up page. 3. Enter your [employer] Access Code exactly as it appears below. You will not need to use this code after youve completed the sign-up process. If you do not sign up before the expiration date, you must request a new code. · [employer] Access Code: [email or web address] Expires: 12/30/2018  1:14 PM 
 
4. Enter the last four digits of your Social Security Number (xxxx) and Date of Birth (mm/dd/yyyy) as indicated and click Submit. You will be taken to the next sign-up page. 5. Create a [employer] ID. This will be your [employer] login ID and cannot be changed, so think of one that is secure and easy to remember. 6. Create a [employer] password. You can change your password at any time. 7. Enter your Password Reset Question and Answer. This can be used at a later time if you forget your password. 8. Enter your e-mail address. You will receive e-mail notification when new information is available in [address] 9. Click Sign Up. You can now view and download portions of your medical record. 10. Click the Download Summary menu link to download a portable copy of your medical information. If you have questions, please visit the Frequently Asked Questions section of the [employer] website. Remember, [employer] is NOT to be used for urgent needs. For medical emergencies, dial 911. Now available from your iPhone and Android! Please provide this summary of care documentation to your next provider. Your primary care clinician is listed as Rita Garcia. If you have any questions after today's visit, please call [phone].

## 2018-10-01 NOTE — PROGRESS NOTES
SPORTS MEDICINE AND PRIMARY CARE Charline Cornejo MD, [facility] [address] Phone:  [phone]  Fax: [phone] Chief Complaint Patient presents with  Hypertension Albert Canela SUBJECTIVE: 
  Lulu Paiz is a 66 y.o. female Patient returns today with known hx of recent hip replacement for DJD, cigarette abuse, dyslipidemia, primary hypertension, onychomycosis and is seen for evaluation. Patient complains of fifth toe pain on the right. She also complains of right hip discomfort. She is doing fairly well with the left hip and walking with a cane. Patient is seen for evaluation. Current Outpatient Prescriptions Medication Sig Dispense Refill  amLODIPine (NORVASC) 5 mg tablet take 1 tablet by mouth once daily 30 Tab 11  
 celecoxib (CELEBREX) 200 mg capsule take 1 capsule by mouth twice a day PRN 60 Cap 6  
 simvastatin (ZOCOR) 40 mg tablet take 1 tablet by mouth NIGHTLY 30 Tab 11  
 levothyroxine (SYNTHROID) 25 mcg tablet take 1 tablet by mouth once daily BEFORE BREAKFAST 30 Tab 11  
 lidocaine (LIDODERM) 5 %(700 mg/patch) 1 patch by TransDERmal route every twenty-four (24) hours. Apply affected area for 12 hours a day and remove for 12 hours a day. 1 Package 11 Past Medical History:  
Diagnosis Date  Arthritis  Chronically on opiate therapy  Cigarette smoker   
 ct 6/25/13  
 COPD (chronic obstructive pulmonary disease) ([facility]  Elevated cholesterol  Epicondylitis elbow, medial, right  Hypertension  Hypothyroid  Left hip pain  Onychomycosis  Prediabetes 11/13/2014  S/P colonoscopy 12-12-13  
 cecal lipoma  S/P colonoscopy 12/12/2013  
 hemorrhoids,diverticulosis  Tubulovillous adenoma of colon 2008 Past Surgical History:  
Procedure Laterality Date  HX BUNIONECTOMY Left [id number]  HX BUNIONECTOMY Right 2011  HX COLONOSCOPY    
 HX HEENT    
 ORAL Allergies Allergen Reactions  Lisinopril Hives and Swelling REVIEW OF SYSTEMS: 
General: negative for - chills or fever ENT: negative for - headaches, nasal congestion or tinnitus Respiratory: negative for - cough, hemoptysis, shortness of breath or wheezing Cardiovascular : negative for - chest pain, edema, palpitations or shortness of breath Gastrointestinal: negative for - abdominal pain, blood in stools, heartburn or nausea/vomiting Genito-Urinary: no dysuria, trouble voiding, or hematuria Musculoskeletal: negative for - gait disturbance, joint pain, joint stiffness or joint swelling Neurological: no TIA or stroke symptoms Hematologic: no bruises, no bleeding, no swollen glands Integument: no lumps, mole changes, nail changes or rash Endocrine: no malaise/lethargy or unexpected weight changes Social History Social History  Marital status:  Spouse name: N/A  
 Number of children: N/A  
 Years of education: N/A Social History Main Topics  Smoking status: Light Tobacco Smoker Packs/day: 0.50 Years: 40.00  Smokeless tobacco: Never Used  Alcohol use No  
   Comment: NONE IN PAST 5 YEARS  
 Drug use: No  
 Sexual activity: Not Currently Other Topics Concern  None Social History Narrative Medical History: Polypectomy-tubulovillous adenoma ? may 13 2013 chest CT negative Gyn History: Last mammogram date 2013. Last menstrual perioddate . OB History: Total pregnancies 0. Surgical History: Denies Past Surgical History Hospitalization/Major Diagnostic Procedure: syncope  Family History: Mother:  80 yrs, heart diseaseFather:  62 yrs, MISister(s): aliveBrother(s): alive, 1 :  
 strokeGrandfather: alive2 brother(s) , 1 sister(s) . Social History: Alcohol Use Patient does not use alcohol. Smoking Status Patient is a  smoker, Quit in: 13 patient fell off the leg start his cigarettes but assures me that his day she w ill never smoked another cigarette the rest of her life. Marital Status:  
 . Lives w ith: alone. Education/School: has completed 3 yrs college. 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 Family History Problem Relation Age of Onset  Heart Disease Mother PVD LEG AMPUTATION  
 Deep Vein Thrombosis Mother  Hypertension Mother  Alzheimer Mother  Hearing Impairment Father  Hypertension Father  Heart Failure Father  Stroke Father  Heart Attack Father  Diabetes Sister  Diabetes Brother  Heart Disease Brother VALVULAR DISEASE  Anesth Problems Neg Hx OBJECTIVE: 
 
Visit Vitals  /72  Pulse 66  Temp 97.8 °F (36.6 °C) (Oral)  Resp 18  Ht 5' (1.524 m)  Wt 129 lb 9.6 oz (58.8 kg)  SpO2 96%  BMI 25.31 kg/m2 CONSTITUTIONAL: well , well nourished, appears age appropriate EYES: perrla, eom intact ENMT:moist mucous membranes, pharynx clear NECK: supple. Thyroid normal 
RESPIRATORY: Chest: clear bilaterally CARDIOVASCULAR: Heart: regular rate and rhythm GASTROINTESTINAL: Abdomen: soft, bowel sounds active HEMATOLOGIC: no pathological lymph nodes palpated MUSCULOSKELETAL: Extremities: no edema, pulse 1+ INTEGUMENT: No unusual rashes or suspicious skin lesions noted. Nails appear normal. 
NEUROLOGIC: non-focal exam  
MENTAL STATUS: alert and oriented, appropriate affect ASSESSMENT: 
1. Chronic obstructive pulmonary disease, unspecified COPD type ([address] 2. Essential hypertension 3. Cigarette smoker 4. Prediabetes 5. Toe pain, chronic, right Patient continues to go AMA and smoke cigarettes. As we have in the past will ask her again to stop cigarettes. As she has in the past she ignores us. There is some discoloration of the fifth digit.   Fortunately she has good pulses, PT and DP. Will get her podiatrist's opinion. BP control is at goal. 
 
Gustavo Messina concerned about her potassium. The last potassium we checked was below normal.  Will therefore check for hypokalemia today. She'll return to see me in about four months, sooner if she has any problems. She's invited to walk in to see us any time should she have an urgency and unable to get an appointment. I have discussed the diagnosis with the patient and the intended plan as seen in the 
orders above. The patient understands and agees with the plan. The patient has  
received an after visit summary and questions were answered concerning 
future plans Patient labs and/or xrays were reviewed Past records were reviewed. PLAN: 
. Orders Placed This Encounter  METABOLIC PANEL, BASIC  REFERRAL TO PODIATRY Follow-up Disposition: 
Return in about 4 months (around 2/1/2019). ATTENTION:  
This medical record was transcribed using an electronic medical records system. Although proofread, it may and can contain electronic and spelling errors. Other human spelling and other errors may be present. Corrections may be executed at a later time. Please feel free to contact us for any clarifications as needed.

## 2018-10-01 NOTE — PROGRESS NOTES
1. Have you been to the ER, urgent care clinic since your last visit? Hospitalized since your last visit? No 
 
2. Have you seen or consulted any other health care providers outside of the [address] [place] [address] since your last visit? Include any pap smears or colon screening.  No  
 
Requesting referral to see

## 2018-10-02 LAB
BUN SERPL-MCNC: 21 MG/DL (ref 8–27)
BUN/CREAT SERPL: 23 (ref 12–28)
CALCIUM SERPL-MCNC: 10.5 MG/DL (ref 8.7–10.3)
CHLORIDE SERPL-SCNC: 102 MMOL/L (ref 96–106)
CO2 SERPL-SCNC: 25 MMOL/L (ref 20–29)
CREAT SERPL-MCNC: 0.92 MG/DL (ref 0.57–1)
GLUCOSE SERPL-MCNC: 94 MG/DL (ref 65–99)
POTASSIUM SERPL-SCNC: 4.5 MMOL/L (ref 3.5–5.2)
SODIUM SERPL-SCNC: 142 MMOL/L (ref 134–144)

## 2018-12-01 RX ORDER — LEVOTHYROXINE SODIUM 25 UG/1
TABLET ORAL
Qty: 30 TAB | Refills: 11 | Status: SHIPPED | OUTPATIENT
Start: 2018-12-01 | End: 2019-07-03 | Stop reason: SDUPTHER

## 2019-01-07 ENCOUNTER — OFFICE VISIT (OUTPATIENT)
Dept: INTERNAL MEDICINE CLINIC | Age: 79
End: 2019-01-07

## 2019-01-07 VITALS
BODY MASS INDEX: 26.86 KG/M2 | TEMPERATURE: 97.8 F | DIASTOLIC BLOOD PRESSURE: 62 MMHG | HEART RATE: 68 BPM | WEIGHT: 136.8 LBS | HEIGHT: 60 IN | OXYGEN SATURATION: 97 % | RESPIRATION RATE: 18 BRPM | SYSTOLIC BLOOD PRESSURE: 105 MMHG

## 2019-01-07 DIAGNOSIS — M19.90 ARTHRITIS: ICD-10-CM

## 2019-01-07 DIAGNOSIS — F17.210 CIGARETTE SMOKER: ICD-10-CM

## 2019-01-07 DIAGNOSIS — I10 ESSENTIAL HYPERTENSION: Primary | ICD-10-CM

## 2019-01-07 DIAGNOSIS — J44.9 CHRONIC OBSTRUCTIVE PULMONARY DISEASE, UNSPECIFIED COPD TYPE (HCC): ICD-10-CM

## 2019-01-07 DIAGNOSIS — G89.29 CHRONIC LEFT SHOULDER PAIN: ICD-10-CM

## 2019-01-07 DIAGNOSIS — E83.52 HYPERCALCEMIA: ICD-10-CM

## 2019-01-07 DIAGNOSIS — M25.512 CHRONIC LEFT SHOULDER PAIN: ICD-10-CM

## 2019-01-07 RX ORDER — CELECOXIB 200 MG/1
CAPSULE ORAL
Qty: 60 CAP | Refills: 6 | Status: SHIPPED | OUTPATIENT
Start: 2019-01-07 | End: 2019-07-03 | Stop reason: SDUPTHER

## 2019-01-07 NOTE — PROGRESS NOTES
[facility]OR[facility] MEDICINE [facility] PR[facility]ARE Brice Garibay MD, [address] [address] Phone:  [phone]  Fax: [phone] Chief Complaint Patient presents with  Hypertension  
  f/u Gayle Jimenez SUBJECTIVE: 
  Beth Juárez is a 78 y.o. female Patient returns today with known history of arthritis, COPD, cigarette addiction, primary hypertension and is seen for evaluation. She was previously chronically on opiate therapy and fortunately she is no longer on opiates. Patient complains of left shoulder discomfort. She is really happy with her left hip. She has good range of motion and she is now walking independently with the assistance of a cane, primarily for balance. Other new complaints denied and patient is seen for evaluation. Current Outpatient Medications Medication Sig Dispense Refill  celecoxib (CELEBREX) 200 mg capsule take 1 capsule by mouth twice a day PRN 60 Cap 6  levothyroxine (SYNTHROID) 25 mcg tablet take 1 tablet by mouth once daily BEFORE BREAKFAST 30 Tab 11  
 amLODIPine (NORVASC) 5 mg tablet take 1 tablet by mouth once daily 30 Tab 11  
 simvastatin (ZOCOR) 40 mg tablet take 1 tablet by mouth NIGHTLY 30 Tab 11  
 lidocaine (LIDODERM) 5 %(700 mg/patch) 1 patch by TransDERmal route every twenty-four (24) hours. Apply affected area for 12 hours a day and remove for 12 hours a day. 1 Package 11 Past Medical History:  
Diagnosis Date  Arthritis  Chronically on opiate therapy  Cigarette smoker   
 ct 6/25/13  
 COPD (chronic obstructive pulmonary disease) ([address]  Elevated cholesterol  Epicondylitis elbow, medial, right  Hypertension  Hypothyroid  Left hip pain  Onychomycosis  Prediabetes 11/13/2014  S/P colonoscopy 12-12-13  
 cecal lipoma  S/P colonoscopy 12/12/2013  
 hemorrhoids,diverticulosis  Tubulovillous adenoma of colon 2008 Past Surgical History:  
Procedure Laterality Date  HX BUNIONECTOMY Left [id number]  HX BUNIONECTOMY Right   HX COLONOSCOPY    
 HX HEENT    
 ORAL Allergies Allergen Reactions  Lisinopril Hives and Swelling REVIEW OF SYSTEMS: 
General: negative for - chills or fever ENT: negative for - headaches, nasal congestion or tinnitus Respiratory: negative for - cough, hemoptysis, shortness of breath or wheezing Cardiovascular : negative for - chest pain, edema, palpitations or shortness of breath Gastrointestinal: negative for - abdominal pain, blood in stools, heartburn or nausea/vomiting Genito-Urinary: no dysuria, trouble voiding, or hematuria Musculoskeletal: negative for - gait disturbance, joint pain, joint stiffness or joint swelling Neurological: no TIA or stroke symptoms Hematologic: no bruises, no bleeding, no swollen glands Integument: no lumps, mole changes, nail changes or rash Endocrine: no malaise/lethargy or unexpected weight changes Social History Socioeconomic History  Marital status:  Spouse name: Not on file  Number of children: Not on file  Years of education: Not on file  Highest education level: Not on file Tobacco Use  Smoking status: Light Tobacco Smoker Packs/day: 0.50 Years: 40.00 Pack years: 20.00  Smokeless tobacco: Never Used Substance and Sexual Activity  Alcohol use: No  
  Comment: NONE IN PAST 5 YEARS  
 Drug use: No  
 Sexual activity: Not Currently Social History Narrative Medical History: Polypectomy-tubulovillous adenoma ? may 13 2013 chest CT negative Gyn History: Last mammogram date 2013. Last menstrual perioddate . OB History: Total pregnancies 0. Surgical History: Denies Past Surgical History Hospitalization/Major Diagnostic Procedure: syncope  Family History: Mother:  80 yrs, heart diseaseFather:  62 yrs, MISister(s): aliveBrother(s): alive, 1 : strokeGrandfather: alive2 brother(s) , 1 sister(s) . Social History: Alcohol Use Patient does not use alcohol. Smoking Status Patient is a  smoker, Quit in: 6-20-13 July 17, 2014 patient  
 fell off the leg start his cigarettes but assures me that his day she w ill never smoked another cigarette the rest of her life. Marital Status:  
 . Lives w ith: alone. Education/School: has completed 3 yrs college. 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 Family History Problem Relation Age of Onset  Heart Disease Mother PVD LEG AMPUTATION  
 Deep Vein Thrombosis Mother  Hypertension Mother  Alzheimer Mother  Hearing Impairment Father  Hypertension Father  Heart Failure Father  Stroke Father  Heart Attack Father  Diabetes Sister  Diabetes Brother  Heart Disease Brother VALVULAR DISEASE  Anesth Problems Neg Hx OBJECTIVE: 
 
Visit Vitals /62 Pulse 68 Temp 97.8 °F (36.6 °C) (Oral) Resp 18 Ht 5' (1.524 m) Wt 136 lb 12.8 oz (62.1 kg) SpO2 97% BMI 26.72 kg/m² CONSTITUTIONAL: well , well nourished, appears age appropriate EYES: perrla, eom intact ENMT:moist mucous membranes, pharynx clear NECK: supple. Thyroid normal 
RESPIRATORY: Chest: clear bilaterally CARDIOVASCULAR: Heart: regular rate and rhythm GASTROINTESTINAL: Abdomen: soft, bowel sounds active HEMATOLOGIC: no pathological lymph nodes palpated MUSCULOSKELETAL: Extremities: no edema, pulse 1+ INTEGUMENT: No unusual rashes or suspicious skin lesions noted. Nails appear normal. 
NEUROLOGIC: non-focal exam  
MENTAL STATUS: alert and oriented, appropriate affect ASSESSMENT: 
1. Essential hypertension 2. Arthritis 3. Cigarette smoker 4. Chronic obstructive pulmonary disease, unspecified COPD type ([address] 5. Hypercalcemia Patient has marked limitation of motion of the shoulder, only to the level of horizontal.  I think some physical therapy would be helpful to her. I do not think an injection would help or necessarily resolve this issue. Will therefore refer to physical therapy. She is known to have hypercalcemia and for that reason will evaluate serologically etiologies of hypercalcemia. We congratulate her. She has not had a cigarette at all this year and that is wonderful, not even on her birthday, which was the [birth year], when she became 78years old. We will see her again in three months and will expect the trend to continue. Her blood pressure control is at goal.  No adjustments will be made. She raised the question of the contaminant of [address] and we advised that was not the case with Amlodipine, but only in combination with Valsartan. She will therefore return to the office in three months, sooner if she has any problems. I have discussed the diagnosis with the patient and the intended plan as seen in the 
orders above. The patient understands and agees with the plan. The patient has  
received an after visit summary and questions were answered concerning 
future plans Patient labs and/or xrays were reviewed Past records were reviewed. PLAN: 
. Orders Placed This Encounter  METABOLIC PANEL, BASIC  
 GAMMOPATHY EVAL, SPEP/GARFIELD, IG QT/FLC  VITAMIN D, 1, 25 DIHYDROXY  
 PTH INTACT  celecoxib (CELEBREX) 200 mg capsule Follow-up Disposition: 
Return in about 3 months (around 4/7/2019). ATTENTION:  
This medical record was transcribed using an electronic medical records system. Although proofread, it may and can contain electronic and spelling errors. Other human spelling and other errors may be present. Corrections may be executed at a later time. Please feel free to contact us for any clarifications as needed.

## 2019-01-07 NOTE — PROGRESS NOTES
1. Have you been to the ER, urgent care clinic since your last visit? Hospitalized since your last visit? No 
 
2. Have you seen or consulted any other health care providers outside of the [address] [place] [address] since your last visit? Include any pap smears or colon screening. No  
 
Wants to discuss amlodipine 
complains of Left shoulder pain

## 2019-01-17 ENCOUNTER — HOSPITAL ENCOUNTER (OUTPATIENT)
Dept: PHYSICAL THERAPY | Age: 79
Discharge: HOME | End: 2019-01-17
Payer: MEDICARE

## 2019-01-17 PROCEDURE — 97110 THERAPEUTIC EXERCISES: CPT

## 2019-01-17 PROCEDURE — 97161 PT EVAL LOW COMPLEX 20 MIN: CPT

## 2019-01-17 NOTE — PROGRESS NOTES
PT INITIAL EVALUATION NOTE - [facility] 2-15 Patient Name: Petty Patrick Date:2019 : 1940 [x]  Patient  Verified Payor: Ebony Mayer / Plan: [facility] MEDICARE COMPLETE / Product Type: Managed Care Medicare / In time:10:10am  Out time:11:00am 
Total Treatment Time (min): 50 Total Timed Codes (min): 10 
1:1 Treatment Time ( only): 10 Visit #: 1 Treatment Area: Left shoulder pain [M25.512] SUBJECTIVE Pain Level (0-10 scale): 5/10 Any medication changes, allergies to medications, adverse drug reactions, diagnosis change, or new procedure performed?: [] No    [x] Yes (see summary sheet for update) Subjective:   
Pt complains of chronic left shoulder pain. Pain started about 5 years ago, after falling on her bed with outstretched arm, while trying to answer the phone. Pain got a little better but in 2018, pt had a left hip replacement and ever since, left shoulder pain has increased. Pt believe it may be due to using left UE on rolling walker, initially after surgery. Pt now uses SPC in right hand. PLOF: chronic left shoulder pain Mechanism of Injury: fall on outstretched arm 5 years ago and OA Previous Treatment/Compliance: heat, avoid using left UE 
PMHx/Surgical Hx: HTN, arthritis, COPD, left hip pain, prediabetes, cigarette smoker, onychomycosis, right medial epicondylitis,chronically on opiate therapy, anemia, Work Hx: retired Living Situation: pt lives alone; has a dog Pt Goals: Ease pain. Barriers: age, OA, pain Motivation: high Substance use: none FABQ Score: low OBJECTIVE/EXAMINATION Posture: rounded shoulders, increased thoracic kyphosis Functional  and Pinch:  unremarkable Palpation: tenderness to palpation left anterior shoulder Left Shoulder ROM:  AROM   PROM Flexion   110 degrees  114 degrees Abduction  40 degrees  80 degrees IR   Hand to left PSIS 35 degrees ER   Hand to T1  52 degrees Joint Mobility Assessment: Glenohumeral: hypomobility P/A glide Flexibility: tightness in left UT, pecs UPPER QUARTER   MUSCLE STRENGTH 
KEY       R  L 
0 - No Contraction   Flexion  --  3/5, pain 1 - Trace    Extension --  4/5 
2 - Poor    Abduction --  4/5 
3 - Fair     IR  --  4/5 
4 - Good    ER  --  3/5, pain 5 - Normal    
 
Neurological: Reflexes / Sensations: intact in bilateral UEs Special Tests: Neer Impingement: positive  Celestin-Lance:  positive Shoulder Abduction: positive  Grand: positive Speed's: positive Modality rationale: decrease pain and increase tissue extensibility to improve the patients ability to use left UE to perform functional activities Type Additional Details  
[] Estim: []Att   []Unatt        []TENS instruct []IFC  []Premod   []NMES []Other:  []w/US   []w/ice   []w/heat Position: Location:  
[]  Traction: [] Cervical       []Lumbar 
                     [] Prone          []Supine []Intermittent   []Continuous Lbs: 
[] before manual 
[] after manual 
[]w/heat  
[]  Ultrasound: []Continuous   [] Pulsed at: 
                         []1MHz   []3MHz Location: 
W/cm2:  
[] Paraffin Location:  
[]w/heat  
[]  Ice     [x]  Heat x 10 minutes 
[]  Ice massage Position: seated with back supported Location: left shoulder at end of session  
[]  Laser 
[]  Other: Position: Location:  
[]  Vasopneumatic Device Pressure:       [] lo [] med [] hi  
Temperature:   
[x] Skin assessment post-treatment:  [x]intact []redness- no adverse reaction 
  []redness  adverse reaction:  
 
10 min Therapeutic Exercise:  [x] See flow sheet :  
Rationale: increase ROM and increase strength to improve the patients ability to use left UE to perform functional activities with increased ease With 
 [] TE 
 [] TA 
 [] neuro 
 [] other: Patient Education: [x] Review HEP- pt given handout with exercises for HEP   
 [] Progressed/Changed HEP based on:  
[] positioning   [] body mechanics   [] transfers   [] heat/ice application   
[] other:   
 
Other Objective/Functional Measures: FOTO: 60/100 Pain Level (0-10 scale) post treatment: 3/10 ASSESSMENT/Changes in Function:  
Pt is a 78year old female who is referred to Physical Therapy by Dr. Carline Morales with a diagnosis of chronic left shoulder pain. Patient will benefit from skilled PT services to modify and progress therapeutic interventions, address functional mobility deficits, address ROM deficits, address strength deficits, analyze and address soft tissue restrictions, analyze and cue movement patterns, analyze and modify body mechanics/ergonomics and assess and modify postural abnormalities to attain pt/PT goals. [x]  See Plan of Care Sorin Cosme PT, DPT  1/17/2019  10:12 AM

## 2019-01-17 NOTE — PROGRESS NOTES
[facility] [address] [address], [address] Phone: [phone]  Fax: [phone] Plan of Care/Statement of Necessity for Physical Therapy Services  2-15 Patient name: Naveen Lim  : [birth year]  Provider#: [id number] Referral source: Jose Hernández, * Medical/Treatment Diagnosis: Left shoulder pain [M25.512] Prior Hospitalization: see medical history Comorbidities: HTN, arthritis, COPD, left hip pain, prediabetes, cigarette smoker, onychomycosis, right medial epicondylitis,chronically on opiate therapy, anemia Prior Level of Function: chronic pain in left shoulder Medications: Verified on Patient Summary List 
Start of Care: 2019      Onset Date:  The Plan of Care and following information is based on the information from the initial evaluation. Assessment/ key information: Pt is a 78year old female who is referred to Physical Therapy by Dr. Ashley Cooley with a diagnosis of chronic left shoulder pain. Patient will benefit from skilled PT services to modify and progress therapeutic interventions, address functional mobility deficits, address ROM deficits, address strength deficits, analyze and address soft tissue restrictions, analyze and cue movement patterns, analyze and modify body mechanics/ergonomics and assess and modify postural abnormalities to attain pt/PT goals. Evaluation Complexity History HIGH Complexity :3+ comorbidities / personal factors will impact the outcome/ POC ; Examination HIGH Complexity : 4+ Standardized tests and measures addressing body structure, function, activity limitation and / or participation in recreation  ;Presentation LOW Complexity : Stable, uncomplicated  ;Clinical Decision Making MEDIUM Complexity : FOTO score of 26-74 Overall Complexity Rating: LOW Problem List: pain affecting function, decrease ROM, decrease strength, decrease ADL/ functional abilitiies, decrease activity tolerance, decrease flexibility/ joint mobility and decrease transfer abilities Treatment Plan may include any combination of the following: Therapeutic exercise, Therapeutic activities, Neuromuscular re-education, Physical agent/modality, Manual therapy and Patient education Patient / Family readiness to learn indicated by: asking questions, trying to perform skills and interest 
Persons(s) to be included in education: patient (P) Barriers to Learning/Limitations: None Patient Goal (s): Ease pain.  Patient Self Reported Health Status: good Rehabilitation Potential: good Short Term Goals: To be accomplished in 4 weeks: 
1) Pt will be Independent with HEP. 2) Pt will be able to reach to shoulder level without pain. 3) Pt will be able to wash/groom hair without increase of pain. Long Term Goals: To be accomplished in 8 weeks: 
1) Pt will be able to Reach above shoulder level without increase of pain. 2) Pt will be able to tuck in the shirt without increase of pain. 3) Pt will be able to sleep on involved side without waking due to pain. 4) Pt will report improvement in overall functional mobility, as measured by FOTO, with an increased score of at least 7 points, from 60 to 67. Frequency / Duration: Patient to be seen 2 times per week for 8 weeks. Patient/ Caregiver education and instruction: self care, activity modification and exercises 
 
[x]  Plan of care has been reviewed with PTA Certification Period: 01/17/2019-04/17/2019 Rayo Cosme, PT, DPT  1/17/2019 10:15 AM 
 
________________________________________________________________________ I certify that the above Therapy Services are being furnished while the patient is under my care. I agree with the treatment plan and certify that this therapy is necessary. [de-identified] Signature:____________________  Date:____________Time: _________

## 2019-01-21 RX ORDER — SIMVASTATIN 40 MG/1
TABLET, FILM COATED ORAL
Qty: 30 TAB | Refills: 11 | Status: SHIPPED | OUTPATIENT
Start: 2019-01-21 | End: 2019-07-03 | Stop reason: SDUPTHER

## 2019-01-22 ENCOUNTER — HOSPITAL ENCOUNTER (OUTPATIENT)
Dept: PHYSICAL THERAPY | Age: 79
Discharge: HOME | End: 2019-01-22
Payer: MEDICARE

## 2019-01-22 PROCEDURE — 97140 MANUAL THERAPY 1/> REGIONS: CPT

## 2019-01-22 PROCEDURE — 97110 THERAPEUTIC EXERCISES: CPT

## 2019-01-22 NOTE — PROGRESS NOTES
PT DAILY TREATMENT NOTE - [facility] 2-15 Patient Name: Shavon Stanley Date:2019 : 1940 [x]  Patient  Verified Payor: Mason Peacock / Plan: [facility] MEDICARE COMPLETE / Product Type: Managed Care Medicare / In time:1:03pm  Out time:2:00pm 
Total Treatment Time (min): 57 Total Timed Codes (min): 47 
1:1 Treatment Time ([facility] only): 40 Visit #: 2 Treatment Area: Left shoulder pain [M25.512] SUBJECTIVE Pain Level (0-10 scale): 0/10 Any medication changes, allergies to medications, adverse drug reactions, diagnosis change, or new procedure performed?: [x] No    [] Yes (see summary sheet for update) Subjective functional status/changes:   [] No changes reported Pt reports compliance with HEP. Pt has been using heating pad at home, which has helped minimize pain. OBJECTIVE Modality rationale: decrease pain and increase tissue extensibility to improve the patients ability to use left UE to perform functional activities Type Additional Details  
[] Estim: []Att   []Unatt    []TENS instruct []IFC  []Premod   []NMES []Other:  []w/US   []w/ice   []w/heat Position: Location:  
[]  Traction: [] Cervical       []Lumbar 
                     [] Prone          []Supine []Intermittent   []Continuous Lbs: 
[] before manual 
[] after manual 
[]w/heat  
[]  Ultrasound: []Continuous   [] Pulsed  
                    at: []1MHz   []3MHz Location: 
W/cm2:  
[] Paraffin Location:  
[]w/heat  
[]  Ice     [x]  Heat x 10 minutes 
[]  Ice massage Position: seated with back supported Location: left shoulder at end of session  
[]  Laser 
[]  Other: Position: Location:  
[]  Vasopneumatic Device Pressure:       [] lo [] med [] hi  
Temperature:   
[x] Skin assessment post-treatment:  [x]intact []redness- no adverse reaction 
  []redness  adverse reaction:  
 
30 min Therapeutic Exercise:  [x] See flow sheet :  
 Rationale: increase ROM, increase strength and improve coordination to improve the patients ability to use left UE for functional activities 17 min Manual Therapy: left glenohumeral joint mobilization- oscillation grade II and III; left scapular mobilization inferior glide and distraction grade II and III; PROM left shoulder flexion, abduction and ER to pt's tolerance Rationale: decrease pain, increase ROM, increase tissue extensibility and increase postural awareness to improve functional use of left UE With 
 [] TE 
 [] TA 
 [] neuro 
 [] other: Patient Education: [x] Review HEP [] Progressed/Changed HEP based on:  
[] positioning   [] body mechanics   [] transfers   [] heat/ice application   
[] other:   
 
Other Objective/Functional Measures: PROM: left shoulder flexion= 130 degrees ; ER= 55 degrees Pain Level (0-10 scale) post treatment: 0/10 ASSESSMENT/Changes in Function:  
Pt tolerated progression of therapeutic exercises without increase in pain. Patient will continue to benefit from skilled PT services to modify and progress therapeutic interventions, address functional mobility deficits, address ROM deficits, address strength deficits, analyze and address soft tissue restrictions, analyze and cue movement patterns, analyze and modify body mechanics/ergonomics and assess and modify postural abnormalities to attain remaining goals. []  See Plan of Care 
[]  See progress note/recertification 
[]  See Discharge Summary Progress towards goals / Updated goals: 
Short Term Goals: To be accomplished in 4 weeks: 
1) Pt will be Independent with HEP. 2) Pt will be able to reach to shoulder level without pain. 3) Pt will be able to wash/groom hair without increase of pain. 
  
Long Term Goals: To be accomplished in 8 weeks: 
1) Pt will be able to Reach above shoulder level without increase of pain. 2) Pt will be able to tuck in the shirt without increase of pain. 3) Pt will be able to sleep on involved side without waking due to pain. 4) Pt will report improvement in overall functional mobility, as measured by FOTO, with an increased score of at least 7 points, from 60 to 67. PLAN [x]  Upgrade activities as tolerated     [x]  Continue plan of care 
[]  Update interventions per flow sheet      
[]  Discharge due to:_ 
[]  Other:_   
 
Lis Piedra, PT, DPT  1/22/2019

## 2019-01-29 ENCOUNTER — APPOINTMENT (OUTPATIENT)
Dept: PHYSICAL THERAPY | Age: 79
End: 2019-01-29
Payer: MEDICARE

## 2019-01-31 ENCOUNTER — APPOINTMENT (OUTPATIENT)
Dept: PHYSICAL THERAPY | Age: 79
End: 2019-01-31
Payer: MEDICARE

## 2019-02-05 ENCOUNTER — HOSPITAL ENCOUNTER (OUTPATIENT)
Dept: PHYSICAL THERAPY | Age: 79
Discharge: HOME | End: 2019-02-05
Payer: MEDICARE

## 2019-02-05 PROCEDURE — 97140 MANUAL THERAPY 1/> REGIONS: CPT

## 2019-02-05 PROCEDURE — 97110 THERAPEUTIC EXERCISES: CPT

## 2019-02-05 NOTE — PROGRESS NOTES
PT DAILY TREATMENT NOTE - [facility] 2-15 Patient Name: Gustavo Childers Date:2019 : 1940 [x]  Patient  Verified Payor: Kris Sprague / Plan: [facility] MEDICARE COMPLETE / Product Type: Managed Care Medicare / In time: 9:35am  Out time: 10:40am 
Total Treatment Time (min): 65 Total Timed Codes (min): 55 
1:1 Treatment Time ( only): 55 Visit #: 3 Treatment Area: Left shoulder pain [M25.512] SUBJECTIVE Pain Level (0-10 scale): 0/10 Any medication changes, allergies to medications, adverse drug reactions, diagnosis change, or new procedure performed?: [x] No    [] Yes (see summary sheet for update) Subjective functional status/changes:   [] No changes reported Pt complains of left elbow and shoulder pain. OBJECTIVE Modality rationale: decrease pain and increase tissue extensibility to improve the patients ability to use left UE to perform functional activities Type Additional Details  
[] Estim: []Att   []Unatt    []TENS instruct []IFC  []Premod   []NMES []Other:  []w/US   []w/ice   []w/heat Position: Location:  
[]  Traction: [] Cervical       []Lumbar 
                     [] Prone          []Supine []Intermittent   []Continuous Lbs: 
[] before manual 
[] after manual 
[]w/heat  
[]  Ultrasound: []Continuous   [] Pulsed  
                    at: []1MHz   []3MHz Location: 
W/cm2:  
[] Paraffin Location:  
[]w/heat  
[x]  Ice x 10 minutes to left elbow    [x]  Heat x 10 minutes to left shoulder  
[]  Ice massage Position: supine at end of session  
[]  Laser 
[]  Other: Position: Location:  
[]  Vasopneumatic Device Pressure:       [] lo [] med [] hi  
Temperature:   
[x] Skin assessment post-treatment:  [x]intact []redness- no adverse reaction 
  []redness  adverse reaction:  
 
40 min Therapeutic Exercise:  [x] See flow sheet :  
Rationale: increase ROM, increase strength and improve coordination to improve the patients ability to use left UE for functional activities 15 min Manual Therapy: left glenohumeral joint mobilization- oscillation grade II and III; PROM left shoulder flexion, abduction and ER to pt's tolerance; STM left ECRL/ECRB followed by passive wrist flexion stretch Rationale: decrease pain, increase ROM, increase tissue extensibility and increase postural awareness to improve functional use of left UE With 
 [] TE 
 [] TA 
 [] neuro 
 [] other: Patient Education: [x] Review HEP [] Progressed/Changed HEP based on:  
[] positioning   [] body mechanics   [] transfers   [] heat/ice application   
[] other:   
 
Other Objective/Functional Measures: -- 
 
Pain Level (0-10 scale) post treatment: 0/10 ASSESSMENT/Changes in Function:  
Pt reported pain relief with manual techniques, especially in left forearm. Pt may try using compression strap on left elbow for pain relief (same as on right); it is possible left wrist extensors have been overused/inflammed due to compensation for limitation in left shoulder. Patient will continue to benefit from skilled PT services to modify and progress therapeutic interventions, address functional mobility deficits, address ROM deficits, address strength deficits, analyze and address soft tissue restrictions, analyze and cue movement patterns, analyze and modify body mechanics/ergonomics and assess and modify postural abnormalities to attain remaining goals. []  See Plan of Care 
[]  See progress note/recertification 
[]  See Discharge Summary Progress towards goals / Updated goals: 
Short Term Goals: To be accomplished in 4 weeks: 
1) Pt will be Independent with HEP. MET 
2) Pt will be able to reach to shoulder level without pain. 3) Pt will be able to wash/groom hair without increase of pain. 
  
Long Term Goals: To be accomplished in 8 weeks: 
1) Pt will be able to Reach above shoulder level without increase of pain. 2) Pt will be able to tuck in the shirt without increase of pain. 3) Pt will be able to sleep on involved side without waking due to pain. 4) Pt will report improvement in overall functional mobility, as measured by FOTO, with an increased score of at least 7 points, from 60 to 67. PLAN [x]  Upgrade activities as tolerated     [x]  Continue plan of care 
[]  Update interventions per flow sheet      
[]  Discharge due to:_ 
[]  Other:_   
 
Chris Winston, PT, DPT  2/5/2019

## 2019-02-12 ENCOUNTER — HOSPITAL ENCOUNTER (OUTPATIENT)
Dept: PHYSICAL THERAPY | Age: 79
Discharge: HOME | End: 2019-02-12
Payer: MEDICARE

## 2019-02-12 PROCEDURE — 97140 MANUAL THERAPY 1/> REGIONS: CPT

## 2019-02-12 PROCEDURE — 97110 THERAPEUTIC EXERCISES: CPT

## 2019-02-12 NOTE — PROGRESS NOTES
PT DAILY TREATMENT NOTE - [facility] 2-15 Patient Name: Diaz Colindres Date:2019 : 1940 [x]  Patient  Verified Payor: Mohini Sweeney / Plan: [facility] MEDICARE COMPLETE / Product Type: Managed Care Medicare / In time: 9:35am  Out time: 10:40am 
Total Treatment Time (min): 65 Total Timed Codes (min): 55 
1:1 Treatment Time ( only): 55 Visit #: 4 Treatment Area: Left shoulder pain [M25.512] SUBJECTIVE Pain Level (0-10 scale): 0/10 Any medication changes, allergies to medications, adverse drug reactions, diagnosis change, or new procedure performed?: [x] No    [] Yes (see summary sheet for update) Subjective functional status/changes:   [] No changes reported Pt complains of \"stiffness\" in left shoulder. OBJECTIVE Modality rationale: decrease pain and increase tissue extensibility to improve the patients ability to use left UE to perform functional activities Type Additional Details  
[] Estim: []Att   []Unatt    []TENS instruct []IFC  []Premod   []NMES []Other:  []w/US   []w/ice   []w/heat Position: Location:  
[]  Traction: [] Cervical       []Lumbar 
                     [] Prone          []Supine []Intermittent   []Continuous Lbs: 
[] before manual 
[] after manual 
[]w/heat  
[]  Ultrasound: []Continuous   [] Pulsed  
                    at: []1MHz   []3MHz Location: 
W/cm2:  
[] Paraffin Location:  
[]w/heat  
[x]  Ice x 10 minutes to left elbow    [x]  Heat x 10 minutes to left shoulder  
[]  Ice massage Position: supine at end of session  
[]  Laser 
[]  Other: Position: Location:  
[]  Vasopneumatic Device Pressure:       [] lo [] med [] hi  
Temperature:   
[x] Skin assessment post-treatment:  [x]intact []redness- no adverse reaction 
  []redness  adverse reaction:  
 
45 min Therapeutic Exercise:  [x] See flow sheet :  
Rationale: increase ROM, increase strength and improve coordination to improve the patients ability to use left UE for functional activities 10 min Manual Therapy: left glenohumeral joint mobilization- oscillation grade II and III; PROM left shoulder flexion, abduction and ER to pt's tolerance Rationale: decrease pain, increase ROM, increase tissue extensibility and increase postural awareness to improve functional use of left UE With 
 [] TE 
 [] TA 
 [] neuro 
 [] other: Patient Education: [x] Review HEP [] Progressed/Changed HEP based on:  
[] positioning   [] body mechanics   [] transfers   [] heat/ice application   
[] other:   
 
Other Objective/Functional Measures: -- 
 
Pain Level (0-10 scale) post treatment: 0/10 ASSESSMENT/Changes in Function:  
Pt demonstrated improvement in pain-free left shoulder A/PROM after manual.  Patient will continue to benefit from skilled PT services to modify and progress therapeutic interventions, address functional mobility deficits, address ROM deficits, address strength deficits, analyze and address soft tissue restrictions, analyze and cue movement patterns, analyze and modify body mechanics/ergonomics and assess and modify postural abnormalities to attain remaining goals. []  See Plan of Care 
[]  See progress note/recertification 
[]  See Discharge Summary Progress towards goals / Updated goals: 
Short Term Goals: To be accomplished in 4 weeks: 
1) Pt will be Independent with HEP. MET 
2) Pt will be able to reach to shoulder level without pain. 3) Pt will be able to wash/groom hair without increase of pain. 
  
Long Term Goals: To be accomplished in 8 weeks: 
1) Pt will be able to Reach above shoulder level without increase of pain. 2) Pt will be able to tuck in the shirt without increase of pain. 3) Pt will be able to sleep on involved side without waking due to pain.  
4) Pt will report improvement in overall functional mobility, as measured by FOTO, with an increased score of at least 7 points, from 60 to 67. PLAN [x]  Upgrade activities as tolerated     [x]  Continue plan of care 
[]  Update interventions per flow sheet      
[]  Discharge due to:_ 
[]  Other:_   
 
Danisha Javed, PT, DPT  2/12/2019

## 2019-02-18 ENCOUNTER — HOSPITAL ENCOUNTER (OUTPATIENT)
Dept: PHYSICAL THERAPY | Age: 79
Discharge: HOME | End: 2019-02-18
Payer: MEDICARE

## 2019-02-18 PROCEDURE — 97110 THERAPEUTIC EXERCISES: CPT

## 2019-02-18 PROCEDURE — 97140 MANUAL THERAPY 1/> REGIONS: CPT

## 2019-02-18 NOTE — PROGRESS NOTES
PT DAILY TREATMENT NOTE - [facility] -15 Patient Name: Stanley Lowe Date:2019 : 1940 [x]  Patient  Verified Payor: [address] [place] [address] / Plan: [facility] MEDICARE COMPLETE / Product Type: Managed Care Medicare / In time: 10:00am  Out time: 10:05am 
Total Treatment Time (min): 65 Total Timed Codes (min): 55 
1:1 Treatment Time ( only): 55 Visit #: 7 Treatment Area: Left shoulder pain [M25.512] SUBJECTIVE Pain Level (0-10 scale): 0/10 Any medication changes, allergies to medications, adverse drug reactions, diagnosis change, or new procedure performed?: [x] No    [] Yes (see summary sheet for update) Subjective functional status/changes:   [] No changes reported Pt states that she has \"good days and bad days\" but overall, notices improved functional mobility of left UE- pt able to roll her hair without difficulty or pain. OBJECTIVE Modality rationale: decrease pain and increase tissue extensibility to improve the patients ability to use left UE to perform functional activities Type Additional Details  
[] Estim: []Att   []Unatt    []TENS instruct []IFC  []Premod   []NMES []Other:  []w/US   []w/ice   []w/heat Position: Location:  
[]  Traction: [] Cervical       []Lumbar 
                     [] Prone          []Supine []Intermittent   []Continuous Lbs: 
[] before manual 
[] after manual 
[]w/heat  
[]  Ultrasound: []Continuous   [] Pulsed  
                    at: []1MHz   []3MHz Location: 
W/cm2:  
[] Paraffin Location:  
[]w/heat  
[x]  Heat x 10 minutes to left shoulder  
[]  Ice massage Position: supine at end of session  
[]  Laser 
[]  Other: Position: Location:  
[]  Vasopneumatic Device Pressure:       [] lo [] med [] hi  
Temperature:   
[x] Skin assessment post-treatment:  [x]intact []redness- no adverse reaction 
  []redness  adverse reaction: 45 min Therapeutic Exercise:  [x] See flow sheet :  
Rationale: increase ROM, increase strength and improve coordination to improve the patients ability to use left UE for functional activities 10 min Manual Therapy: left glenohumeral joint mobilization- oscillation grade II and III; PROM left shoulder flexion, abduction and ER to pt's tolerance Rationale: decrease pain, increase ROM, increase tissue extensibility and increase postural awareness to improve functional use of left UE With 
 [] TE 
 [] TA 
 [] neuro 
 [] other: Patient Education: [x] Review HEP [] Progressed/Changed HEP based on:  
[] positioning   [] body mechanics   [] transfers   [] heat/ice application   
[] other:   
 
Other Objective/Functional Measures: -- 
 
Pain Level (0-10 scale) post treatment: 0/10 ASSESSMENT/Changes in Function:  
Pt fatigues quickly with rotator cuff strengthening exercises but denies pain. Pt has met all short-term PT goals. Patient will continue to benefit from skilled PT services to modify and progress therapeutic interventions, address functional mobility deficits, address ROM deficits, address strength deficits, analyze and address soft tissue restrictions, analyze and cue movement patterns, analyze and modify body mechanics/ergonomics and assess and modify postural abnormalities to attain remaining goals. []  See Plan of Care 
[]  See progress note/recertification 
[]  See Discharge Summary Progress towards goals / Updated goals: 
Short Term Goals: To be accomplished in 4 weeks: 
1) Pt will be Independent with HEP. MET 
2) Pt will be able to reach to shoulder level without pain. MET 
3) Pt will be able to wash/groom hair without increase of pain. MET 
  
Long Term Goals: To be accomplished in 8 weeks: 
1) Pt will be able to Reach above shoulder level without increase of pain. 2) Pt will be able to tuck in the shirt without increase of pain. 3) Pt will be able to sleep on involved side without waking due to pain. 4) Pt will report improvement in overall functional mobility, as measured by FOTO, with an increased score of at least 7 points, from 60 to 67. PLAN [x]  Upgrade activities as tolerated     [x]  Continue plan of care 
[]  Update interventions per flow sheet      
[]  Discharge due to:_ 
[]  Other:_   
 
Armond Huerta, PT, DPT  2/18/2019

## 2019-02-20 ENCOUNTER — APPOINTMENT (OUTPATIENT)
Dept: PHYSICAL THERAPY | Age: 79
End: 2019-02-20
Payer: MEDICARE

## 2019-02-25 ENCOUNTER — HOSPITAL ENCOUNTER (OUTPATIENT)
Dept: PHYSICAL THERAPY | Age: 79
Discharge: HOME | End: 2019-02-25
Payer: MEDICARE

## 2019-02-25 PROCEDURE — 97140 MANUAL THERAPY 1/> REGIONS: CPT

## 2019-02-25 PROCEDURE — 97110 THERAPEUTIC EXERCISES: CPT

## 2019-02-25 NOTE — PROGRESS NOTES
PT DAILY TREATMENT NOTE - [facility] 2-15 Patient Name: Fannie Primer Date:2019 : 1940 [x]  Patient  Verified Payor: Diogo Landing / Plan: [facility] MEDICARE COMPLETE / Product Type: Managed Care Medicare / In time: 10:00am  Out time: 10:50am 
Total Treatment Time (min): 50 Total Timed Codes (min): 40 
1:1 Treatment Time ([facility] only): 40 Visit #: 0 Treatment Area: Left shoulder pain [M25.512] SUBJECTIVE Pain Level (0-10 scale): 0/10 Any medication changes, allergies to medications, adverse drug reactions, diagnosis change, or new procedure performed?: [x] No    [] Yes (see summary sheet for update) Subjective functional status/changes:   [] No changes reported Pt is pleased to report that she had no pain in left shoulder over the weekend. OBJECTIVE Modality rationale: decrease pain and increase tissue extensibility to improve the patients ability to use left UE to perform functional activities Type Additional Details  
[] Estim: []Att   []Unatt    []TENS instruct []IFC  []Premod   []NMES []Other:  []w/US   []w/ice   []w/heat Position: Location:  
[]  Traction: [] Cervical       []Lumbar 
                     [] Prone          []Supine []Intermittent   []Continuous Lbs: 
[] before manual 
[] after manual 
[]w/heat  
[]  Ultrasound: []Continuous   [] Pulsed  
                    at: []1MHz   []3MHz Location: 
W/cm2:  
[] Paraffin Location:  
[]w/heat  
[x]  Heat x 10 minutes to left shoulder  
[]  Ice massage Position: supine at end of session  
[]  Laser 
[]  Other: Position: Location:  
[]  Vasopneumatic Device Pressure:       [] lo [] med [] hi  
Temperature:   
[x] Skin assessment post-treatment:  [x]intact []redness- no adverse reaction 
  []redness  adverse reaction:  
 
30 min Therapeutic Exercise:  [x] See flow sheet :  
Rationale: increase ROM, increase strength and improve coordination to improve the patients ability to use left UE for functional activities 10 min Manual Therapy: passive stretching left biceps and wrist flexors; PROM left shoulder flexion, abduction and external rotation, to pt's tolerance Rationale: decrease pain, increase ROM, increase tissue extensibility and increase postural awareness to improve functional use of left UE With 
 [] TE 
 [] TA 
 [] neuro 
 [] other: Patient Education: [x] Review HEP [] Progressed/Changed HEP based on:  
[] positioning   [] body mechanics   [] transfers   [] heat/ice application   
[] other:   
 
Other Objective/Functional Measures: -- 
 
Pain Level (0-10 scale) post treatment: 0/10 ASSESSMENT/Changes in Function:  
Pt demonstrates improved performance of exercises; denies \"popping\" in left shoulder with ROM exercises. Patient will continue to benefit from skilled PT services to modify and progress therapeutic interventions, address functional mobility deficits, address ROM deficits, address strength deficits, analyze and address soft tissue restrictions, analyze and cue movement patterns, analyze and modify body mechanics/ergonomics and assess and modify postural abnormalities to attain remaining goals. []  See Plan of Care 
[]  See progress note/recertification 
[]  See Discharge Summary Progress towards goals / Updated goals: 
Short Term Goals: To be accomplished in 4 weeks: 
1) Pt will be Independent with HEP. MET 
2) Pt will be able to reach to shoulder level without pain. MET 
3) Pt will be able to wash/groom hair without increase of pain. MET 
  
Long Term Goals: To be accomplished in 8 weeks: 
1) Pt will be able to Reach above shoulder level without increase of pain. progressing 2) Pt will be able to tuck in the shirt without increase of pain. progressing 3) Pt will be able to sleep on involved side without waking due to pain. progressing 4) Pt will report improvement in overall functional mobility, as measured by FOTO, with an increased score of at least 7 points, from 60 to 67. progressing PLAN [x]  Upgrade activities as tolerated     [x]  Continue plan of care 
[]  Update interventions per flow sheet      
[]  Discharge due to:_ 
[]  Other:_   
 
Elli Berman, PT, DPT  2/25/2019

## 2019-02-25 NOTE — PROGRESS NOTES
[facility] [address] [address], [address] Phone: [phone] Fax: [phone] Progress Note Name: Nino Atkinson : 1940 MD: Phil Alfonso, * Treatment Diagnosis: Left shoulder pain [M25.512] Start of Care: 2019 Visits from Start of Care: 5 Missed Visits: 0 Summary of Care: Pt has participated in 5 outpatient PT sessions, 2019-2019, for chronic left shoulder pain. Treatment has included therapeutic exercise, manual therapy, moist hot pack, pt education and home exercise program.   
 
Assessment / Recommendations: Pt fatigues quickly with rotator cuff strengthening exercises. Noted improvement in pain-free left shoulder AROM. Pt is able to roll her hair without pain or difficulty; however, she continues to have \"good days and bad days\" due to OA. Patient will continue to benefit from skilled PT services to modify and progress therapeutic interventions, address functional mobility deficits, address ROM deficits, address strength deficits, analyze and address soft tissue restrictions, analyze and cue movement patterns, analyze and modify body mechanics/ergonomics and assess and modify postural abnormalities to attain remaining goals. Progress towards goals / Updated goals: 
Short Term Goals: To be accomplished in 4 weeks: 
1) Pt will be Independent with HEP. MET 
2) Pt will be able to reach to shoulder level without pain. MET 
3) Pt will be able to wash/groom hair without increase of pain. MET 
  
Long Term Goals: To be accomplished in 8 weeks: 
1) Pt will be able to Reach above shoulder level without increase of pain. progressing 2) Pt will be able to tuck in the shirt without increase of pain. progressing 3) Pt will be able to sleep on involved side without waking due to pain. progressing 4) Pt will report improvement in overall functional mobility, as measured by FOTO, with an increased score of at least 7 points, from 60 to 67. progressing Chase Saeed, PT, DPT  2/25/2019  
 
________________________________________________________________________ NOTE TO PHYSICIAN:  Please complete the following and fax to: [facility]: Fax: [phone]. Retain this original for your records. If you are unable to process this request in 24 hours, please contact our office. ____ I have read the above report and request that my patient continue therapy with the following changes/special instructions: 
____ I have read the above report and request that my patient be discharged from therapy [de-identified] Signature:_________________ Date:___________Time:__________

## 2019-02-27 ENCOUNTER — HOSPITAL ENCOUNTER (OUTPATIENT)
Dept: PHYSICAL THERAPY | Age: 79
Discharge: HOME | End: 2019-02-27
Payer: MEDICARE

## 2019-02-27 PROCEDURE — 97110 THERAPEUTIC EXERCISES: CPT

## 2019-02-27 PROCEDURE — 97140 MANUAL THERAPY 1/> REGIONS: CPT

## 2019-02-27 NOTE — PROGRESS NOTES
PT DAILY TREATMENT NOTE - [facility] 2-15 Patient Name: Donna Wang Date:2019 : 1940 [x]  Patient  Verified Payor: Tabitha Tripp / Plan: [facility] MEDICARE COMPLETE / Product Type: Managed Care Medicare / In time: 9:31am  Out time: 10:20am 
Total Treatment Time (min): 49 Total Timed Codes (min): 39 
1:1 Treatment Time ( [address] only): 39 Visit #: 8 Treatment Area: Left shoulder pain [M25.512] SUBJECTIVE Pain Level (0-10 scale): 0/10 Any medication changes, allergies to medications, adverse drug reactions, diagnosis change, or new procedure performed?: [x] No    [] Yes (see summary sheet for update) Subjective functional status/changes:   [] No changes reported Pt states that left shoulder does not \"pop\" or hurt at night like it used to. OBJECTIVE Modality rationale: decrease pain and increase tissue extensibility to improve the patients ability to use left UE to perform functional activities Type Additional Details  
[] Estim: []Att   []Unatt    []TENS instruct []IFC  []Premod   []NMES []Other:  []w/US   []w/ice   []w/heat Position: Location:  
[]  Traction: [] Cervical       []Lumbar 
                     [] Prone          []Supine []Intermittent   []Continuous Lbs: 
[] before manual 
[] after manual 
[]w/heat  
[]  Ultrasound: []Continuous   [] Pulsed  
                    at: []1MHz   []3MHz Location: 
W/cm2:  
[] Paraffin Location:  
[]w/heat  
[x]  Heat x 10 minutes to left shoulder  
[]  Ice massage Position: supine at end of session  
[]  Laser 
[]  Other: Position: Location:  
[]  Vasopneumatic Device Pressure:       [] lo [] med [] hi  
Temperature:   
[x] Skin assessment post-treatment:  [x]intact []redness- no adverse reaction 
  []redness  adverse reaction:  
 
30 min Therapeutic Exercise:  [x] See flow sheet :  
Rationale: increase ROM, increase strength and improve coordination to improve the patients ability to use left UE for functional activities 9 min Manual Therapy: passive stretching left biceps and wrist flexors; AAROM left shoulder flexion; PROM left shoulder flexion, abduction and external rotation, to pt's tolerance Rationale: decrease pain, increase ROM, increase tissue extensibility and increase postural awareness to improve functional use of left UE With 
 [] TE 
 [] TA 
 [] neuro 
 [] other: Patient Education: [x] Review HEP [] Progressed/Changed HEP based on:  
[] positioning   [] body mechanics   [] transfers   [] heat/ice application   
[] other:   
 
Other Objective/Functional Measures: -- 
 
Pain Level (0-10 scale) post treatment: 0/10 ASSESSMENT/Changes in Function: Pt progressing with left shoulder strength and functional use. Plan to continue outpatient PT x 1 more week, then discharge to [facility]. []  See Plan of Care 
[]  See progress note/recertification 
[]  See Discharge Summary Progress towards goals / Updated goals: 
Short Term Goals: To be accomplished in 4 weeks: 
1) Pt will be Independent with HEP. MET 
2) Pt will be able to reach to shoulder level without pain. MET 
3) Pt will be able to wash/groom hair without increase of pain. MET 
  
Long Term Goals: To be accomplished in 8 weeks: 
1) Pt will be able to Reach above shoulder level without increase of pain. progressing 2) Pt will be able to tuck in the shirt without increase of pain. progressing 3) Pt will be able to sleep on involved side without waking due to pain. progressing 4) Pt will report improvement in overall functional mobility, as measured by FOTO, with an increased score of at least 7 points, from 60 to 67. progressing PLAN [x]  Upgrade activities as tolerated     [x]  Continue plan of care 
[]  Update interventions per flow sheet      
[]  Discharge due to:_ 
[]  Other:_   
 
Georgiana Cunningham, PT, DPT  2/27/2019

## 2019-03-04 ENCOUNTER — HOSPITAL ENCOUNTER (OUTPATIENT)
Dept: PHYSICAL THERAPY | Age: 79
Discharge: HOME | End: 2019-03-04
Payer: MEDICARE

## 2019-03-04 PROCEDURE — 97110 THERAPEUTIC EXERCISES: CPT

## 2019-03-04 PROCEDURE — 97140 MANUAL THERAPY 1/> REGIONS: CPT

## 2019-03-04 NOTE — PROGRESS NOTES
PT DAILY TREATMENT NOTE - [facility] 2-15 Patient Name: Yuliet Mack Date:3/4/2019 : 1940 [x]  Patient  Verified Payor: Dameon Cantrell / [facility] [place] [facility] / Product Type: Managed Care Medicare / In time: 9:55am  Out time: 11:00am 
Total Treatment Time (min): 65 Total Timed Codes (min): 55 
1:1 Treatment Time ( only): 55 Visit #: 8 Treatment Area: Left shoulder pain [M25.512] SUBJECTIVE Pain Level (0-10 scale): 0/10 Any medication changes, allergies to medications, adverse drug reactions, diagnosis change, or new procedure performed?: [x] No    [] Yes (see summary sheet for update) Subjective functional status/changes:   [] No changes reported Pt has no left shoulder pain at night. OBJECTIVE Modality rationale: decrease pain and increase tissue extensibility to improve the patients ability to use left UE to perform functional activities Type Additional Details  
[] Estim: []Att   []Unatt    []TENS instruct []IFC  []Premod   []NMES []Other:  []w/US   []w/ice   []w/heat Position: Location:  
[]  Traction: [] Cervical       []Lumbar 
                     [] Prone          []Supine []Intermittent   []Continuous Lbs: 
[] before manual 
[] after manual 
[]w/heat  
[]  Ultrasound: []Continuous   [] Pulsed  
                    at: []1MHz   []3MHz Location: 
W/cm2:  
[] Paraffin Location:  
[]w/heat  
[x]  Heat x 10 minutes to left shoulder  
[]  Ice massage Position: supine at end of session  
[]  Laser 
[]  Other: Position: Location:  
[]  Vasopneumatic Device Pressure:       [] lo [] med [] hi  
Temperature:   
[x] Skin assessment post-treatment:  [x]intact []redness- no adverse reaction 
  []redness  adverse reaction:  
 
45 min Therapeutic Exercise:  [x] See flow sheet :  
Rationale: increase ROM, increase strength and improve coordination to improve the patients ability to use left UE for functional activities 10 min Manual Therapy: passive stretching left biceps and wrist flexors; AAROM left shoulder flexion; PROM left shoulder flexion, abduction and external rotation, to pt's tolerance Rationale: decrease pain, increase ROM, increase tissue extensibility and increase postural awareness to improve functional use of left UE With 
 [] TE 
 [] TA 
 [] neuro 
 [] other: Patient Education: [x] Review HEP [] Progressed/Changed HEP based on:  
[] positioning   [] body mechanics   [] transfers   [] heat/ice application   
[] other:   
 
Other Objective/Functional Measures: -- 
 
Pain Level (0-10 scale) post treatment: 0/10 ASSESSMENT/Changes in Function:  
Pt able to perform increased repetitions for rotator cuff strengthening without pain. Plan to continue x 1 more session then discharge to [facility]. []  See Plan of Care 
[]  See progress note/recertification 
[]  See Discharge Summary Progress towards goals / Updated goals: 
Short Term Goals: To be accomplished in 4 weeks: 
1) Pt will be Independent with HEP. MET 
2) Pt will be able to reach to shoulder level without pain. MET 
3) Pt will be able to wash/groom hair without increase of pain. MET 
  
Long Term Goals: To be accomplished in 8 weeks: 
1) Pt will be able to Reach above shoulder level without increase of pain. progressing 2) Pt will be able to tuck in the shirt without increase of pain. progressing 3) Pt will be able to sleep on involved side without waking due to pain. progressing 4) Pt will report improvement in overall functional mobility, as measured by FOTO, with an increased score of at least 7 points, from 60 to 67. progressing PLAN [x]  Upgrade activities as tolerated     [x]  Continue plan of care 
[]  Update interventions per flow sheet      
[]  Discharge due to:_ 
[]  Other:_   
 
Mari Zaragoza, PT, DPT  3/4/2019

## 2019-03-06 ENCOUNTER — APPOINTMENT (OUTPATIENT)
Dept: PHYSICAL THERAPY | Age: 79
End: 2019-03-06
Payer: MEDICARE

## 2019-03-07 ENCOUNTER — HOSPITAL ENCOUNTER (OUTPATIENT)
Dept: PHYSICAL THERAPY | Age: 79
Discharge: HOME | End: 2019-03-07
Payer: MEDICARE

## 2019-03-07 PROCEDURE — 97110 THERAPEUTIC EXERCISES: CPT

## 2019-03-07 NOTE — PROGRESS NOTES
PT DAILY TREATMENT NOTE - [facility] 2-15    Patient Name: Eder Garces  FFVC:[id number]  : 1940  [x]  Patient  Verified  Payor: [employer] MEDICARE COMPLETE / Plan: [facility] [employer] MEDICARE COMPLETE / Product Type: Managed Care Medicare /    In time: 9:00am  Out time: 9:50am  Total Treatment Time (min): 50  Total Timed Codes (min): 40  1:1 Treatment Time ( only): 30  Visit #: 9    Treatment Area: Left shoulder pain [M25.512]    SUBJECTIVE  Pain Level (0-10 scale): 0/10  Any medication changes, allergies to medications, adverse drug reactions, diagnosis change, or new procedure performed?: [x] No    [] Yes (see summary sheet for update)  Subjective functional status/changes:   [] No changes reported  Pt is pleased with gains made with PT; however, continues to complain of \"achy pain\" in left shoulder with the cold weather this morning.     OBJECTIVE  Modality rationale: decrease pain and increase tissue extensibility to improve the patients ability to use left UE to perform functional activities   Type Additional Details   [] Estim: []Att   []Unatt    []TENS instruct                  []IFC  []Premod   []NMES                     []Other:  []w/US   []w/ice   []w/heat  Position:  Location:   []  Traction: [] Cervical       []Lumbar                       [] Prone          []Supine                       []Intermittent   []Continuous Lbs:  [] before manual  [] after manual  []w/heat   []  Ultrasound: []Continuous   [] Pulsed                       at: []1MHz   []3MHz Location:  W/cm2:   [] Paraffin         Location:   []w/heat   [x]  Heat x 10 minutes to left shoulder   []  Ice massage Position: supine at end of session   []  Laser  []  Other: Position:  Location:   []  Vasopneumatic Device Pressure:       [] lo [] med [] hi   Temperature:    [x] Skin assessment post-treatment:  [x]intact []redness- no adverse reaction    []redness  adverse reaction:     45 min Therapeutic Exercise:  [x] See flow sheet :   Rationale: increase ROM, increase strength and improve coordination to improve the patients ability to use left UE for functional activities          With   [] TE   [] TA   [] neuro   [] other: Patient Education: [x] Review HEP    [] Progressed/Changed HEP based on:   [] positioning   [] body mechanics   [] transfers   [] heat/ice application    [] other:      Other Objective/Functional Measures: FOTO: 62/100  Pain Level (0-10 scale) post treatment: 0/10    ASSESSMENT/Changes in Function:   []  See Plan of Care  []  See progress note/recertification  [x]  See Discharge Summary         Progress towards goals / Updated goals:  Short Term Goals: To be accomplished in 4 weeks:  1) Pt will be Independent with HEP. MET  2) Pt will be able to reach to shoulder level without pain. MET  3) Pt will be able to wash/groom hair without increase of pain. MET     Long Term Goals: To be accomplished in 8 weeks:  1) Pt will be able to Reach above shoulder level without increase of pain. MET  2) Pt will be able to tuck in the shirt without increase of pain. MET  3) Pt will be able to sleep on involved side without waking due to pain. MET  4) Pt will report improvement in overall functional mobility, as measured by FOTO, with an increased score of at least 7 points, from 60 to 67.  NOT MET (score= 62)    PLAN  []  Upgrade activities as tolerated     []  Continue plan of care  []  Update interventions per flow sheet       [x]  Discharge due to:_good progress towards goals/met  []  Other:_      Edelmira Granados, PT, DPT  3/7/2019

## 2019-03-07 NOTE — ANCILLARY DISCHARGE INSTRUCTIONS
[facility]   [address], [address]  Phone: [phone] Fax: [phone]      Discharge Summary 2-15      Patient name: Beth Juárez  :   Provider#: [id number]  Referral source: Stephen Maki, *      Medical/Treatment Diagnosis: Left shoulder pain [M25.512]     Prior Hospitalization: see medical history     Comorbidities: HTN, arthritis, COPD, left hip pain, prediabetes, cigarette smoker, onychomycosis, right medial epicondylitis,chronically on opiate therapy, anemia  Prior Level of Function: chronic pain in left shoulder  Prior Level of Function:chronic pain in left shoulder  Medications: Verified on Patient Summary List    Start of Care: 2019     Onset Date:   Visits from Start of Care: 9     Missed Visits: 0  Reporting Period : 2019 to 2019    Progress towards goals / Updated goals:  Short Term Goals: To be accomplished in 4 weeks:  1) Pt will be Independent with HEP. MET  2) Pt will be able to reach to shoulder level without pain. MET  3) Pt will be able to wash/groom hair without increase of pain. MET     Long Term Goals: To be accomplished in 8 weeks:  1) Pt will be able to Reach above shoulder level without increase of pain. MET  2) Pt will be able to tuck in the shirt without increase of pain. MET  3) Pt will be able to sleep on involved side without waking due to pain. MET  4) Pt will report improvement in overall functional mobility, as measured by FOTO, with an increased score of at least 7 points, from 60 to 67. NOT MET (score= 62)    Assessment/Summary of care: Pt participated in 9 outpatient PT sessions, 2017-2019, for chronic left shoulder pain. Treatment included therapeutic exercise, manual therapy, moist hot pack, pt education and home exercise program. Pt demonstrates significant improvement in left shoulder pain-free ROM and overall functional use.  Pt is able to sleep through the night without waking due to pain. Pt has made good progress towards PT goals (met 6/7 goals); therefore, pt is discharged to [facility] at this time.     RECOMMENDATIONS:  [x]Discontinue therapy: [x]Patient has reached or is progressing toward set goals     []Patient is non-compliant or has abdicated     []Due to lack of appreciable progress towards set goals    Sheryl Frances, PT, DPT  3/7/2019

## 2019-04-15 ENCOUNTER — OFFICE VISIT (OUTPATIENT)
Dept: INTERNAL MEDICINE CLINIC | Age: 79
End: 2019-04-15

## 2019-04-15 VITALS
BODY MASS INDEX: 26.56 KG/M2 | HEIGHT: 60 IN | OXYGEN SATURATION: 94 % | SYSTOLIC BLOOD PRESSURE: 113 MMHG | RESPIRATION RATE: 18 BRPM | TEMPERATURE: 98.3 F | HEART RATE: 83 BPM | DIASTOLIC BLOOD PRESSURE: 70 MMHG | WEIGHT: 135.3 LBS

## 2019-04-15 DIAGNOSIS — R73.03 PREDIABETES: ICD-10-CM

## 2019-04-15 DIAGNOSIS — R05.3 COUGH, PERSISTENT: ICD-10-CM

## 2019-04-15 DIAGNOSIS — M77.01 EPICONDYLITIS ELBOW, MEDIAL, RIGHT: ICD-10-CM

## 2019-04-15 DIAGNOSIS — I10 ESSENTIAL HYPERTENSION: Primary | ICD-10-CM

## 2019-04-15 DIAGNOSIS — F17.210 CIGARETTE SMOKER: ICD-10-CM

## 2019-04-15 DIAGNOSIS — Z78.0 POST-MENOPAUSAL: ICD-10-CM

## 2019-04-15 DIAGNOSIS — J44.9 CHRONIC OBSTRUCTIVE PULMONARY DISEASE, UNSPECIFIED COPD TYPE (HCC): ICD-10-CM

## 2019-04-15 DIAGNOSIS — M16.12 PRIMARY OSTEOARTHRITIS OF LEFT HIP: ICD-10-CM

## 2019-04-15 DIAGNOSIS — M19.90 ARTHRITIS: ICD-10-CM

## 2019-04-15 NOTE — PROGRESS NOTES
1. Have you been to the ER, urgent care clinic since your last visit? Hospitalized since your last visit? No 
 
2. Have you seen or consulted any other health care providers outside of the [address] [place] [address] since your last visit? Include any pap smears or colon screening.  No

## 2019-04-15 NOTE — PROGRESS NOTES
[facility] Lilliana Champagne MD, [facility] [address] Phone:  [phone]  Fax: [phone] Chief Complaint Patient presents with  Hypertension Terry Verdugo SUBJECTIVE: 
  Eloisa Manrique is a 78 y.o. female Patient returns today after completing physical therapy. Her left shoulder is much better than what it was. She has a known history of DJD, cigarette abuse, COPD, dyslipidemia, primary hypertension, prediabetes, and is seen for evaluation. Since we last saw her she was seen by her insurance company nurse, who recommended bone scan, low dose CT, help to quit smoking, fall risk prevention plan, advanced directive. She would like to follow most of those recommendations. Since we last saw her she unfortunately had to put her dog down. It was having nausea and vomiting. The dog was 13.5, which puts her into the 80year old category for dog life. Patient is seen for evaluation. Current Outpatient Medications Medication Sig Dispense Refill  simvastatin (ZOCOR) 40 mg tablet take 1 tablet by mouth NIGHTLY 30 Tab 11  
 celecoxib (CELEBREX) 200 mg capsule take 1 capsule by mouth twice a day PRN 60 Cap 6  levothyroxine (SYNTHROID) 25 mcg tablet take 1 tablet by mouth once daily BEFORE BREAKFAST 30 Tab 11  
 amLODIPine (NORVASC) 5 mg tablet take 1 tablet by mouth once daily 30 Tab 11 Past Medical History:  
Diagnosis Date  Arthritis  Chronically on opiate therapy  Cigarette smoker   
 ct 6/25/13  
 COPD (chronic obstructive pulmonary disease) ([facility] [address]  Elevated cholesterol  Epicondylitis elbow, medial, right  Hypertension  Hypothyroid  Left hip pain  Onychomycosis  Prediabetes 11/13/2014  S/P colonoscopy 12-12-13  
 cecal lipoma  S/P colonoscopy 12/12/2013  
 hemorrhoids,diverticulosis  Tubulovillous adenoma of colon 2008 Past Surgical History:  
Procedure Laterality Date  HX BUNIONECTOMY Left [id number]  HX BUNIONECTOMY Right   HX COLONOSCOPY    
 HX HEENT    
 ORAL Allergies Allergen Reactions  Lisinopril Hives and Swelling REVIEW OF SYSTEMS: 
General: negative for - chills or fever ENT: negative for - headaches, nasal congestion or tinnitus Respiratory: negative for - cough, hemoptysis, shortness of breath or wheezing Cardiovascular : negative for - chest pain, edema, palpitations or shortness of breath Gastrointestinal: negative for - abdominal pain, blood in stools, heartburn or nausea/vomiting Genito-Urinary: no dysuria, trouble voiding, or hematuria Musculoskeletal: negative for - gait disturbance, joint pain, joint stiffness or joint swelling Neurological: no TIA or stroke symptoms Hematologic: no bruises, no bleeding, no swollen glands Integument: no lumps, mole changes, nail changes or rash Endocrine: no malaise/lethargy or unexpected weight changes Social History Socioeconomic History  Marital status:  Spouse name: Not on file  Number of children: Not on file  Years of education: Not on file  Highest education level: Not on file Tobacco Use  Smoking status: Light Tobacco Smoker Packs/day: 0.50 Years: 40.00 Pack years: 20.00  Smokeless tobacco: Never Used Substance and Sexual Activity  Alcohol use: No  
  Comment: NONE IN PAST 5 YEARS  
 Drug use: No  
 Sexual activity: Not Currently Social History Narrative Medical History: Polypectomy-tubulovillous adenoma ? may 13 2013 chest CT negative Gyn History: Last mammogram date 2013. Last menstrual perioddate . OB History: Total pregnancies 0. Surgical History: Denies Past Surgical History Hospitalization/Major Diagnostic Procedure: syncope  Family History: Mother:  80 yrs, heart diseaseFather:  62 yrs, MISister(s): aliveBrother(s): alive, 1 : strokeGrandfather: alive2 brother(s) , 1 sister(s) . Social History: Alcohol Use Patient does not use alcohol. Smoking Status Patient is a  smoker, Quit in: 6-20-13 July 17, 2014 patient  
 fell off the leg start his cigarettes but assures me that his day she w ill never smoked another cigarette the rest of her life. Marital Status:  
 . Lives w ith: alone. Education/School: has completed 3 yrs college. 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 Family History Problem Relation Age of Onset  Heart Disease Mother PVD LEG AMPUTATION  
 Deep Vein Thrombosis Mother  Hypertension Mother  Alzheimer Mother  Hearing Impairment Father  Hypertension Father  Heart Failure Father  Stroke Father  Heart Attack Father  Diabetes Sister  Diabetes Brother  Heart Disease Brother VALVULAR DISEASE  Anesth Problems Neg Hx OBJECTIVE: 
 
Visit Vitals /70 Pulse 83 Temp 98.3 °F (36.8 °C) (Oral) Resp 18 Ht 5' (1.524 m) Wt 135 lb 4.8 oz (61.4 kg) SpO2 94% BMI 26.42 kg/m² CONSTITUTIONAL: well , well nourished, appears age appropriate EYES: perrla, eom intact ENMT:moist mucous membranes, pharynx clear NECK: supple. Thyroid normal 
RESPIRATORY: Chest: clear bilaterally CARDIOVASCULAR: Heart: regular rate and rhythm GASTROINTESTINAL: Abdomen: soft, bowel sounds active HEMATOLOGIC: no pathological lymph nodes palpated MUSCULOSKELETAL: Extremities: no edema, Foot exam reveals asteatosis. There are absent pulses. Sensation is intact. INTEGUMENT: No unusual rashes or suspicious skin lesions noted. Nails appear normal. 
NEUROLOGIC: non-focal exam  
MENTAL STATUS: alert and oriented, appropriate affect ASSESSMENT: 
1. Essential hypertension 2. Chronic obstructive pulmonary disease, unspecified COPD type ([facility] [address] 3. Primary osteoarthritis of left hip 4. Prediabetes 5. Epicondylitis elbow, medial, right 6. Arthritis 7. Cigarette smoker 8. Cough, persistent 9. Post-menopausal   
 
Blood pressure control is at goal. 
 
Her BMI is approaching ideal body weight. She picked up the cigarettes again. She had stopped previously, but with the dog being sick she started smoking. She does have a cough and will ask for repeat CT of her chest.  Will also ask for PFTs to define her COPD. The DJD is not bothersome today. She is extremely remorseful regarding the loss of her dog and we talk to her at length and give her consolation. She will be back to see us in three to four months, sooner if needed. I have discussed the diagnosis with the patient and the intended plan as seen in the 
orders above. The patient understands and agees with the plan. The patient has  
received an after visit summary and questions were answered concerning 
future plans Patient labs and/or xrays were reviewed Past records were reviewed. PLAN: 
. Orders Placed This Encounter  CT CHEST WO CONT  DEXA BONE DENSITY APPENDICULAR  
 CBC WITH AUTOMATED DIFF Follow-up and Dispositions · Return in about 4 months (around 8/15/2019). ATTENTION:  
This medical record was transcribed using an electronic medical records system. Although proofread, it may and can contain electronic and spelling errors. Other human spelling and other errors may be present. Corrections may be executed at a later time. Please feel free to contact us for any clarifications as needed.

## 2019-04-15 NOTE — ASSESSMENT & PLAN NOTE
Stable, based on history, physical exam and review of pertinent labs, studies and medications; meds reconciled; continue current treatment plan. Key COPD Medications Patient is on no COPD/Asthma meds. Lab Results Component Value Date/Time  WBC 4.8 07/16/2018 06:40 AM  
 HGB 8.6 07/16/2018 06:40 AM  
 HCT 27.1 07/16/2018 06:40 AM  
 PLATELET 760 28/34/6949 06:40 AM

## 2019-04-16 LAB
BASOPHILS # BLD AUTO: 0 X10E3/UL (ref 0–0.2)
BASOPHILS NFR BLD AUTO: 0 %
EOSINOPHIL # BLD AUTO: 0 X10E3/UL (ref 0–0.4)
EOSINOPHIL NFR BLD AUTO: 0 %
ERYTHROCYTE [DISTWIDTH] IN BLOOD BY AUTOMATED COUNT: 13.9 % (ref 12.3–15.4)
HCT VFR BLD AUTO: 36.9 % (ref 34–46.6)
HGB BLD-MCNC: 12 G/DL (ref 11.1–15.9)
IMM GRANULOCYTES # BLD AUTO: 0 X10E3/UL (ref 0–0.1)
IMM GRANULOCYTES NFR BLD AUTO: 0 %
LYMPHOCYTES # BLD AUTO: 1.4 X10E3/UL (ref 0.7–3.1)
LYMPHOCYTES NFR BLD AUTO: 28 %
MCH RBC QN AUTO: 29.6 PG (ref 26.6–33)
MCHC RBC AUTO-ENTMCNC: 32.5 G/DL (ref 31.5–35.7)
MCV RBC AUTO: 91 FL (ref 79–97)
MONOCYTES # BLD AUTO: 0.4 X10E3/UL (ref 0.1–0.9)
MONOCYTES NFR BLD AUTO: 8 %
NEUTROPHILS # BLD AUTO: 3.2 X10E3/UL (ref 1.4–7)
NEUTROPHILS NFR BLD AUTO: 64 %
PLATELET # BLD AUTO: 231 X10E3/UL (ref 150–379)
RBC # BLD AUTO: 4.06 X10E6/UL (ref 3.77–5.28)
WBC # BLD AUTO: 5.1 X10E3/UL (ref 3.4–10.8)

## 2019-04-29 ENCOUNTER — HOSPITAL ENCOUNTER (OUTPATIENT)
Dept: PULMONOLOGY | Age: 79
Discharge: HOME | End: 2019-04-29
Attending: INTERNAL MEDICINE
Payer: MEDICARE

## 2019-04-29 ENCOUNTER — HOSPITAL ENCOUNTER (OUTPATIENT)
Dept: MAMMOGRAPHY | Age: 79
Discharge: HOME | End: 2019-04-29
Attending: INTERNAL MEDICINE
Payer: MEDICARE

## 2019-04-29 ENCOUNTER — HOSPITAL ENCOUNTER (OUTPATIENT)
Dept: CT IMAGING | Age: 79
Discharge: HOME | End: 2019-04-29
Attending: INTERNAL MEDICINE
Payer: MEDICARE

## 2019-04-29 DIAGNOSIS — Z78.0 POST-MENOPAUSAL: ICD-10-CM

## 2019-04-29 DIAGNOSIS — R05.3 COUGH, PERSISTENT: ICD-10-CM

## 2019-04-29 DIAGNOSIS — J44.9 CHRONIC OBSTRUCTIVE PULMONARY DISEASE, UNSPECIFIED COPD TYPE (HCC): ICD-10-CM

## 2019-04-29 DIAGNOSIS — F17.210 CIGARETTE SMOKER: ICD-10-CM

## 2019-04-29 PROCEDURE — 71250 CT THORAX DX C-: CPT

## 2019-04-29 PROCEDURE — 94010 BREATHING CAPACITY TEST: CPT

## 2019-04-29 PROCEDURE — 77080 DXA BONE DENSITY AXIAL: CPT

## 2019-05-01 NOTE — PROCEDURES
[address]   PULMONARY FUNCTION TEST    Name:  Maribel Yoon  MR#:  [id number]  :  1940  ACCOUNT #:  [de-identified]  DATE OF SERVICE:  2019      CLINICAL INDICATION:  COPD. FINDINGS:  Spirometry was performed. Spirometry is within normal limits without evidence of airflow obstruction.   The flow volume loop appears grossly normal.      Art Capellan MD      [email or web address]/  D:  2019 11:50  T:  2019 16:49  JOB #:  [id number]

## 2019-06-03 RX ORDER — AMLODIPINE BESYLATE 5 MG/1
TABLET ORAL
Qty: 30 TAB | Refills: 11 | Status: SHIPPED | OUTPATIENT
Start: 2019-06-03 | End: 2019-09-23 | Stop reason: SDUPTHER

## 2019-07-03 RX ORDER — CELECOXIB 200 MG/1
CAPSULE ORAL
Qty: 60 CAP | Refills: 6 | Status: SHIPPED | OUTPATIENT
Start: 2019-07-03 | End: 2020-04-22

## 2019-07-03 RX ORDER — LEVOTHYROXINE SODIUM 25 UG/1
TABLET ORAL
Qty: 30 TAB | Refills: 11 | Status: SHIPPED | OUTPATIENT
Start: 2019-07-03 | End: 2019-12-10 | Stop reason: SDUPTHER

## 2019-07-03 RX ORDER — SIMVASTATIN 40 MG/1
TABLET, FILM COATED ORAL
Qty: 30 TAB | Refills: 11 | Status: SHIPPED | OUTPATIENT
Start: 2019-07-03 | End: 2020-06-18

## 2019-08-12 ENCOUNTER — OFFICE VISIT (OUTPATIENT)
Dept: INTERNAL MEDICINE CLINIC | Age: 79
End: 2019-08-12

## 2019-08-12 VITALS
HEIGHT: 60 IN | HEART RATE: 66 BPM | RESPIRATION RATE: 16 BRPM | OXYGEN SATURATION: 96 % | DIASTOLIC BLOOD PRESSURE: 75 MMHG | SYSTOLIC BLOOD PRESSURE: 135 MMHG | TEMPERATURE: 97.9 F | WEIGHT: 140.3 LBS | BODY MASS INDEX: 27.55 KG/M2

## 2019-08-12 DIAGNOSIS — M16.12 PRIMARY OSTEOARTHRITIS OF LEFT HIP: ICD-10-CM

## 2019-08-12 DIAGNOSIS — Z13.31 SCREENING FOR DEPRESSION: ICD-10-CM

## 2019-08-12 DIAGNOSIS — I10 ESSENTIAL HYPERTENSION: ICD-10-CM

## 2019-08-12 DIAGNOSIS — Z00.00 MEDICARE ANNUAL WELLNESS VISIT, SUBSEQUENT: Primary | ICD-10-CM

## 2019-08-12 DIAGNOSIS — M19.90 ARTHRITIS: ICD-10-CM

## 2019-08-12 DIAGNOSIS — J44.9 CHRONIC OBSTRUCTIVE PULMONARY DISEASE, UNSPECIFIED COPD TYPE (HCC): ICD-10-CM

## 2019-08-12 DIAGNOSIS — R73.03 PREDIABETES: ICD-10-CM

## 2019-08-12 DIAGNOSIS — Z13.39 SCREENING FOR ALCOHOLISM: ICD-10-CM

## 2019-08-12 NOTE — PROGRESS NOTES
SPORTS MEDICINE AND PRIMARY CARE  Isaiah Angel MD, Maryann Perez[address] [address]  Phone:  [phone]  Fax: [phone]       Chief Complaint   Patient presents with   [facility] Wellness Visit   . SUBJECTIVE:    Francisco Javier Barker is a 78 y.o. female Patient returns today with known history of COPD, primary hypertension, osteoarthritis of hip, prediabetes, arthritis, and is seen for evaluation. Patient states that she was in [employer] a couple weeks ago, was walking going up to a pew and her right leg just gave way. People were standing around to help her, so she did not actually had a hard fall, but she still went to the floor. She had been seen by Dr. Clora Lundborg in the past and he sent her to someone to have hip injected, but it did not help at all. He told her she would eventually have to have a THR. She says she is not ready for that yet. Other new complaints denied and she is seen for evaluation.            Current Outpatient Medications   Medication Sig Dispense Refill    simvastatin (ZOCOR) 40 mg tablet take 1 tablet by mouth NIGHTLY 30 Tab 11    levothyroxine (SYNTHROID) 25 mcg tablet take 1 tablet by mouth once daily BEFORE BREAKFAST 30 Tab 11    celecoxib (CELEBREX) 200 mg capsule take 1 capsule by mouth twice a day PRN 60 Cap 6    amLODIPine (NORVASC) 5 mg tablet take 1 tablet by mouth once daily 30 Tab 11     Past Medical History:   Diagnosis Date    Arthritis     Chronically on opiate therapy     Cigarette smoker     ct 6/25/13    COPD (chronic obstructive pulmonary disease) ([facility] [address]     Elevated cholesterol     Epicondylitis elbow, medial, right     Hypertension     Hypothyroid     Left hip pain     Onychomycosis     Prediabetes 11/13/2014    S/P colonoscopy 12-12-13    cecal lipoma    S/P colonoscopy 12/12/2013    hemorrhoids,diverticulosis    Tubulovillous adenoma of colon 2008     Past Surgical History:   Procedure Laterality Date    HX BUNIONECTOMY Left 1978  HX BUNIONECTOMY Right 2011    HX COLONOSCOPY      HX HEENT      ORAL     Allergies   Allergen Reactions    Lisinopril Hives and Swelling         REVIEW OF SYSTEMS:  General: negative for - chills or fever  ENT: negative for - headaches, nasal congestion or tinnitus  Respiratory: negative for - cough, hemoptysis, shortness of breath or wheezing  Cardiovascular : negative for - chest pain, edema, palpitations or shortness of breath  Gastrointestinal: negative for - abdominal pain, blood in stools, heartburn or nausea/vomiting  Genito-Urinary: no dysuria, trouble voiding, or hematuria  Musculoskeletal: negative for - gait disturbance, joint pain, joint stiffness or joint swelling  Neurological: no TIA or stroke symptoms  Hematologic: no bruises, no bleeding, no swollen glands  Integument: no lumps, mole changes, nail changes or rash  Endocrine: no malaise/lethargy or unexpected weight changes      Social History     Socioeconomic History    Marital status:      Spouse name: Not on file    Number of children: Not on file    Years of education: Not on file    Highest education level: Not on file   Tobacco Use    Smoking status: Light Tobacco Smoker     Packs/day: 0.50     Years: 40.00     Pack years: 20.00    Smokeless tobacco: Never Used   Substance and Sexual Activity    Alcohol use: No     Comment: NONE IN PAST 5 YEARS    Drug use: No    Sexual activity: Not Currently   Social History Narrative    Medical History: Polypectomy-tubulovillous adenoma ? may 13 2013 chest CT negative    Gyn History: Last mammogram date 2013. Last menstrual perioddate . OB History: Total pregnancies 0. Surgical History: Denies Past Surgical History    Hospitalization/Major Diagnostic Procedure: syncope     Family History: Mother:  80 yrs, heart diseaseFather:  62 yrs, MISister(s): aliveBrother(s): alive, 1 :    strokeGrandfather: alive2 brother(s) , 1 sister(s) . Social History: Alcohol Use Patient does not use alcohol. Smoking Status Patient is a  smoker, Quit in: 6-20-13 July 17, 2014 patient    fell off the leg start his cigarettes but assures me that his day she w ill never smoked another cigarette the rest of her life. Marital Status:    . Lives w ith: alone. Education/School: has completed 3 yrs college. 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 Family History   Problem Relation Age of Onset    Heart Disease Mother         PVD LEG AMPUTATION    Deep Vein Thrombosis Mother     Hypertension Mother     Alzheimer Mother     Hearing Impairment Father     Hypertension Father     Heart Failure Father     Stroke Father     Heart Attack Father     Diabetes Sister     Diabetes Brother     Heart Disease Brother         VALVULAR DISEASE    Anesth Problems Neg Hx        OBJECTIVE:    Visit Vitals  /75   Pulse 66   Temp 97.9 °F (36.6 °C) (Oral)   Resp 16   Ht 5' (1.524 m)   Wt 140 lb 4.8 oz (63.6 kg)   SpO2 96%   BMI 27.40 kg/m²     CONSTITUTIONAL: well , well nourished, appears age appropriate  EYES: perrla, eom intact  ENMT:moist mucous membranes, pharynx clear  NECK: supple. Thyroid normal  RESPIRATORY: Chest: clear bilaterally   CARDIOVASCULAR: Heart: regular rate and rhythm  GASTROINTESTINAL: Abdomen: soft, bowel sounds active  HEMATOLOGIC: no pathological lymph nodes palpated  MUSCULOSKELETAL: Extremities: no edema, pulse 1+   INTEGUMENT: No unusual rashes or suspicious skin lesions noted. Nails appear normal.  NEUROLOGIC: non-focal exam   MENTAL STATUS: alert and oriented, appropriate affect           ASSESSMENT:  1. Medicare annual wellness visit, subsequent    2. Screening for alcoholism    3. Screening for depression    4. Chronic obstructive pulmonary disease, unspecified COPD type ([facility] [address]    5. Essential hypertension    6. Primary osteoarthritis of left hip    7. Prediabetes    8. Arthritis      Patient's medical status is stable. I am very concerned about the hip and this fall. But she is not ready to see Dr. Dee Núñez at this time, she tells me. We encourage her to use the cane, although I do not think the cane would have helped in this instance. If she has another episode we strongly encouraged her to make an appointment to see Dr. Dee Núñez. BP control is at goal.    She has had a left THR in the past and is doing very well with that. There is a history of prediabetes, for which we will check metabolic status today. She has elected to continue to smoke cigarettes. She is down to five cigarettes a day. Each time we have seen her ever since we first met her some 30 years ago we have asked her to stop smoking cigarettes. The best she can do is cut back, but every once in a while she will stop for a short period of time, but go right back to it. She will be back to see us again in about three months, sooner if she has any problems. I have discussed the diagnosis with the patient and the intended plan as seen in the  orders above. The patient understands and agees with the plan. The patient has   received an after visit summary and questions were answered concerning  future plans  Patient labs and/or xrays were reviewed  Past records were reviewed. PLAN:  .  Orders Placed This Encounter    Depression Screen Annual    URINALYSIS W/ RFLX MICROSCOPIC    CBC WITH AUTOMATED DIFF    METABOLIC PANEL, COMPREHENSIVE    LIPID PANEL    TSH 3RD GENERATION    HEMOGLOBIN A1C WITH EAG       Follow-up and Dispositions    · Return in about 3 months (around 11/12/2019). ATTENTION:   This medical record was transcribed using an electronic medical records system. Although proofread, it may and can contain electronic and spelling errors. Other human spelling and other errors may be present. Corrections may be executed at a later time.   Please feel free to contact us for any clarifications as needed.

## 2019-08-12 NOTE — PATIENT INSTRUCTIONS
Medicare Wellness Visit, Female The best way to live healthy is to have a lifestyle where you eat a well-balanced diet, exercise regularly, limit alcohol use, and quit all forms of tobacco/nicotine, if applicable. Regular preventive services are another way to keep healthy. Preventive services (vaccines, screening tests, monitoring & exams) can help personalize your care plan, which helps you manage your own care. Screening tests can find health problems at the earliest stages, when they are easiest to treat. [facility] follows the current, evidence-based guidelines published by the [facility] Deed[facility] ([facility]) when recommending preventive services for our patients. Because we follow these guidelines, sometimes recommendations change over time as research supports it. (For example, mammograms used to be recommended annually. Even though Medicare will still pay for an annual mammogram, the newer guidelines recommend a mammogram every two years for women of average risk.) Of course, you and your doctor may decide to screen more often for some diseases, based on your risk and your health status. Preventive services for you include: - Medicare offers their members a free annual wellness visit, which is time for you and your primary care provider to discuss and plan for your preventive service needs. Take advantage of this benefit every year! 
-All adults over the age of 72 should receive the recommended pneumonia vaccines. Current USPSTF guidelines recommend a series of two vaccines for the best pneumonia protection.  
-All adults should have a flu vaccine yearly and a tetanus vaccine every 10 years. All adults age 61 and older should receive a shingles vaccine once in their lifetime.   
-A bone mass density test is recommended when a woman turns 65 to screen for osteoporosis. This test is only recommended one time, as a screening. Some providers will use this same test as a disease monitoring tool if you already have osteoporosis. -All adults age 38-68 who are overweight should have a diabetes screening test once every three years.  
-Other screening tests and preventive services for persons with diabetes include: an eye exam to screen for diabetic retinopathy, a kidney function test, a foot exam, and stricter control over your cholesterol.  
-Cardiovascular screening for adults with routine risk involves an electrocardiogram (ECG) at intervals determined by your doctor.  
-Colorectal cancer screenings should be done for adults age 54-65 with no increased risk factors for colorectal cancer. There are a number of acceptable methods of screening for this type of cancer. Each test has its own benefits and drawbacks. Discuss with your doctor what is most appropriate for you during your annual wellness visit. The different tests include: colonoscopy (considered the best screening method), a fecal occult blood test, a fecal DNA test, and sigmoidoscopy. -Breast cancer screenings are recommended every other year for women of normal risk, age 54-69. 
-Cervical cancer screenings for women over age 72 are only recommended with certain risk factors.  
-All adults born between [facility] should be screened once for Hepatitis C. Here is a list of your current Health Maintenance items (your personalized list of preventive services) with a due date: 
Health Maintenance Due Topic Date Due  
 Annual Well Visit  05/22/2019

## 2019-08-12 NOTE — PROGRESS NOTES
1. Have you been to the ER, urgent care clinic since your last visit? Hospitalized since your last visit? No    2. Have you seen or consulted any other health care providers outside of the [address] [place] [address] since your last visit? Include any pap smears or colon screening. No     Right leg pain  This is the Subsequent Medicare Annual Wellness Exam, performed 12 months or more after the Initial AWV or the last Subsequent AWV    I have reviewed the patient's medical history in detail and updated the computerized patient record.      History     Past Medical History:   Diagnosis Date    Arthritis     Chronically on opiate therapy     Cigarette smoker     ct 6/25/13    COPD (chronic obstructive pulmonary disease) (HCC)     Elevated cholesterol     Epicondylitis elbow, medial, right     Hypertension     Hypothyroid     Left hip pain     Onychomycosis     Prediabetes 11/13/2014    S/P colonoscopy 12-12-13    cecal lipoma    S/P colonoscopy 12/12/2013    hemorrhoids,diverticulosis    Tubulovillous adenoma of colon 2008      Past Surgical History:   Procedure Laterality Date    HX BUNIONECTOMY Left 1978    HX BUNIONECTOMY Right 2011    HX COLONOSCOPY      HX HEENT      ORAL     Current Outpatient Medications   Medication Sig Dispense Refill    simvastatin (ZOCOR) 40 mg tablet take 1 tablet by mouth NIGHTLY 30 Tab 11    levothyroxine (SYNTHROID) 25 mcg tablet take 1 tablet by mouth once daily BEFORE BREAKFAST 30 Tab 11    celecoxib (CELEBREX) 200 mg capsule take 1 capsule by mouth twice a day PRN 60 Cap 6    amLODIPine (NORVASC) 5 mg tablet take 1 tablet by mouth once daily 30 Tab 11     Allergies   Allergen Reactions    Lisinopril Hives and Swelling     Family History   Problem Relation Age of Onset    Heart Disease Mother         PVD LEG AMPUTATION    Deep Vein Thrombosis Mother     Hypertension Mother     Alzheimer Mother     Hearing Impairment Father     Hypertension Father     Heart Failure Father     Stroke Father     Heart Attack Father     Diabetes Sister     Diabetes Brother     Heart Disease Brother         VALVULAR DISEASE    Anesth Problems Neg Hx      Social History     Tobacco Use    Smoking status: Light Tobacco Smoker     Packs/day: 0.50     Years: 40.00     Pack years: 20.00    Smokeless tobacco: Never Used   Substance Use Topics    Alcohol use: No     Comment: NONE IN PAST 5 YEARS     Patient Active Problem List   Diagnosis Code    Hypertension I10    Arthritis M19.90    COPD (chronic obstructive pulmonary disease) ([facility] [address] J44.9    Left hip pain M25.552    Cigarette smoker F17.210    Hip pain, left M25.552    Onychomycosis B35.1    Epicondylitis elbow, medial, right M77.01    Chronically on opiate therapy Z79.891    Prediabetes R73.03    Primary osteoarthritis of left hip M16.12    Anemia D64.9       Depression Risk Factor Screening:     3 most recent PHQ Screens 8/12/2019   Little interest or pleasure in doing things Not at all   Feeling down, depressed, irritable, or hopeless Not at all   Total Score PHQ 2 0     Alcohol Risk Factor Screening: You do not drink alcohol or very rarely. Functional Ability and Level of Safety:   Hearing Loss  Hearing is good. Activities of Daily Living  The home contains: no safety equipment. Patient does total self care    Fall Risk  Fall Risk Assessment, last 12 mths 8/12/2019   Able to walk? Yes   Fall in past 12 months? Yes   Fall with injury?  No   Number of falls in past 12 months 1   Fall Risk Score 1       Abuse Screen  Patient is not abused    Cognitive Screening   Evaluation of Cognitive Function:  Has your family/caregiver stated any concerns about your memory: no  Normal    Patient Care Team   Patient Care Team:  Yahir Rosales MD as PCP - General (Internal Medicine)  Matt Escoto DPM (Podiatry)    Assessment/Plan   Education and counseling provided:  Are appropriate based on today's review and evaluation        Health Maintenance Due   Topic Date Due    MEDICARE YEARLY EXAM  05/22/2019

## 2019-08-13 LAB
ALBUMIN SERPL-MCNC: 4.6 G/DL (ref 3.5–4.8)
ALBUMIN/GLOB SERPL: 1.4 {RATIO} (ref 1.2–2.2)
ALP SERPL-CCNC: 115 IU/L (ref 39–117)
ALT SERPL-CCNC: 6 IU/L (ref 0–32)
APPEARANCE UR: ABNORMAL
AST SERPL-CCNC: 17 IU/L (ref 0–40)
BASOPHILS # BLD AUTO: 0 X10E3/UL (ref 0–0.2)
BASOPHILS NFR BLD AUTO: 1 %
BILIRUB SERPL-MCNC: 0.3 MG/DL (ref 0–1.2)
BILIRUB UR QL STRIP: NEGATIVE
BUN SERPL-MCNC: 20 MG/DL (ref 8–27)
BUN/CREAT SERPL: 22 (ref 12–28)
CALCIUM SERPL-MCNC: 10 MG/DL (ref 8.7–10.3)
CHLORIDE SERPL-SCNC: 105 MMOL/L (ref 96–106)
CHOLEST SERPL-MCNC: 145 MG/DL (ref 100–199)
CO2 SERPL-SCNC: 23 MMOL/L (ref 20–29)
COLOR UR: YELLOW
CREAT SERPL-MCNC: 0.9 MG/DL (ref 0.57–1)
EOSINOPHIL # BLD AUTO: 0.2 X10E3/UL (ref 0–0.4)
EOSINOPHIL NFR BLD AUTO: 5 %
ERYTHROCYTE [DISTWIDTH] IN BLOOD BY AUTOMATED COUNT: 14.3 % (ref 12.3–15.4)
EST. AVERAGE GLUCOSE BLD GHB EST-MCNC: 108 MG/DL
GLOBULIN SER CALC-MCNC: 3.4 G/DL (ref 1.5–4.5)
GLUCOSE SERPL-MCNC: 95 MG/DL (ref 65–99)
GLUCOSE UR QL: NEGATIVE
HBA1C MFR BLD: 5.4 % (ref 4.8–5.6)
HCT VFR BLD AUTO: 39 % (ref 34–46.6)
HDLC SERPL-MCNC: 49 MG/DL
HGB BLD-MCNC: 12.2 G/DL (ref 11.1–15.9)
HGB UR QL STRIP: NEGATIVE
IMM GRANULOCYTES # BLD AUTO: 0 X10E3/UL (ref 0–0.1)
IMM GRANULOCYTES NFR BLD AUTO: 0 %
KETONES UR QL STRIP: NEGATIVE
LDLC SERPL CALC-MCNC: 76 MG/DL (ref 0–99)
LEUKOCYTE ESTERASE UR QL STRIP: NEGATIVE
LYMPHOCYTES # BLD AUTO: 1.5 X10E3/UL (ref 0.7–3.1)
LYMPHOCYTES NFR BLD AUTO: 33 %
MCH RBC QN AUTO: 30 PG (ref 26.6–33)
MCHC RBC AUTO-ENTMCNC: 31.3 G/DL (ref 31.5–35.7)
MCV RBC AUTO: 96 FL (ref 79–97)
MICRO URNS: ABNORMAL
MONOCYTES # BLD AUTO: 0.4 X10E3/UL (ref 0.1–0.9)
MONOCYTES NFR BLD AUTO: 8 %
NEUTROPHILS # BLD AUTO: 2.4 X10E3/UL (ref 1.4–7)
NEUTROPHILS NFR BLD AUTO: 53 %
NITRITE UR QL STRIP: NEGATIVE
PH UR STRIP: 5.5 [PH] (ref 5–7.5)
PLATELET # BLD AUTO: 239 X10E3/UL (ref 150–450)
POTASSIUM SERPL-SCNC: 4.6 MMOL/L (ref 3.5–5.2)
PROT SERPL-MCNC: 8 G/DL (ref 6–8.5)
PROT UR QL STRIP: NEGATIVE
RBC # BLD AUTO: 4.07 X10E6/UL (ref 3.77–5.28)
SODIUM SERPL-SCNC: 143 MMOL/L (ref 134–144)
SP GR UR: 1.02 (ref 1–1.03)
TRIGL SERPL-MCNC: 98 MG/DL (ref 0–149)
TSH SERPL DL<=0.005 MIU/L-ACNC: 1.92 UIU/ML (ref 0.45–4.5)
UROBILINOGEN UR STRIP-MCNC: 0.2 MG/DL (ref 0.2–1)
VLDLC SERPL CALC-MCNC: 20 MG/DL (ref 5–40)
WBC # BLD AUTO: 4.5 X10E3/UL (ref 3.4–10.8)

## 2019-09-23 RX ORDER — AMLODIPINE BESYLATE 5 MG/1
TABLET ORAL
Qty: 90 TAB | Refills: 3 | Status: SHIPPED | OUTPATIENT
Start: 2019-09-23 | End: 2020-06-18

## 2019-12-06 ENCOUNTER — OFFICE VISIT (OUTPATIENT)
Dept: INTERNAL MEDICINE CLINIC | Age: 79
End: 2019-12-06

## 2019-12-06 VITALS
WEIGHT: 140 LBS | RESPIRATION RATE: 16 BRPM | BODY MASS INDEX: 27.48 KG/M2 | TEMPERATURE: 97.7 F | DIASTOLIC BLOOD PRESSURE: 78 MMHG | HEART RATE: 60 BPM | SYSTOLIC BLOOD PRESSURE: 132 MMHG | OXYGEN SATURATION: 96 % | HEIGHT: 60 IN

## 2019-12-06 DIAGNOSIS — D64.9 ANEMIA, UNSPECIFIED TYPE: ICD-10-CM

## 2019-12-06 DIAGNOSIS — I10 ESSENTIAL HYPERTENSION: Primary | ICD-10-CM

## 2019-12-06 DIAGNOSIS — F17.210 CIGARETTE SMOKER: ICD-10-CM

## 2019-12-06 DIAGNOSIS — M16.12 PRIMARY OSTEOARTHRITIS OF LEFT HIP: ICD-10-CM

## 2019-12-06 DIAGNOSIS — G89.29 HIP PAIN, CHRONIC, RIGHT: ICD-10-CM

## 2019-12-06 DIAGNOSIS — M25.551 HIP PAIN, CHRONIC, RIGHT: ICD-10-CM

## 2019-12-06 DIAGNOSIS — J44.9 CHRONIC OBSTRUCTIVE PULMONARY DISEASE, UNSPECIFIED COPD TYPE (HCC): ICD-10-CM

## 2019-12-06 NOTE — PROGRESS NOTES
1. Have you been to the ER, urgent care clinic since your last visit? Hospitalized since your last visit? No    2. Have you seen or consulted any other health care providers outside of the [address] [place] [address] since your last visit? Include any pap smears or colon screening.  No     Wants to discuss right hip pain

## 2019-12-06 NOTE — PROGRESS NOTES
SPORTS MEDICINE AND PRIMARY CARE  Guilherme Watkins MD, [address] [address]  Phone:  [phone]  Fax: [phone]       Chief Complaint   Patient presents with    Hypertension   . SUBJECTIVE:    Arianne Block is a 78 y.o. female Patient returns today with known history of primary hypertension, cigarette abuse, COPD, osteoarthritis of the left hip, S/P replacement, anemia, and is seen for evaluation.            Current Outpatient Medications   Medication Sig Dispense Refill    amLODIPine (NORVASC) 5 mg tablet take 1 tablet by mouth once daily 90 Tab 3    simvastatin (ZOCOR) 40 mg tablet take 1 tablet by mouth NIGHTLY 30 Tab 11    levothyroxine (SYNTHROID) 25 mcg tablet take 1 tablet by mouth once daily BEFORE BREAKFAST 30 Tab 11    celecoxib (CELEBREX) 200 mg capsule take 1 capsule by mouth twice a day PRN 60 Cap 6     Past Medical History:   Diagnosis Date    Arthritis     Chronically on opiate therapy     Cigarette smoker     ct 6/25/13    COPD (chronic obstructive pulmonary disease) ([facility] [address]     Elevated cholesterol     Epicondylitis elbow, medial, right     Hip pain, chronic, right hip pain    Hypertension     Hypothyroid     IGT (impaired glucose tolerance) 11/13/2014    Onychomycosis     S/P colonoscopy 12-12-13    cecal lipoma    S/P colonoscopy 12/12/2013    hemorrhoids,diverticulosis    Tubulovillous adenoma of colon 2008     Past Surgical History:   Procedure Laterality Date    HX BUNIONECTOMY Left 1978    HX BUNIONECTOMY Right 2011    HX COLONOSCOPY      HX HEENT      ORAL     Allergies   Allergen Reactions    Lisinopril Hives and Swelling         REVIEW OF SYSTEMS:  General: negative for - chills or fever  ENT: negative for - headaches, nasal congestion or tinnitus  Respiratory: negative for - cough, hemoptysis, shortness of breath or wheezing  Cardiovascular : negative for - chest pain, edema, palpitations or shortness of breath  Gastrointestinal: negative for - abdominal pain, blood in stools, heartburn or nausea/vomiting  Genito-Urinary: no dysuria, trouble voiding, or hematuria  Musculoskeletal: negative for - gait disturbance, joint pain, joint stiffness or joint swelling  Neurological: no TIA or stroke symptoms  Hematologic: no bruises, no bleeding, no swollen glands  Integument: no lumps, mole changes, nail changes or rash  Endocrine: no malaise/lethargy or unexpected weight changes      Social History     Socioeconomic History    Marital status:      Spouse name: Not on file    Number of children: Not on file    Years of education: Not on file    Highest education level: Not on file   Tobacco Use    Smoking status: Light Tobacco Smoker     Packs/day: 0.50     Years: 40.00     Pack years: 20.00    Smokeless tobacco: Never Used   Substance and Sexual Activity    Alcohol use: No     Comment: NONE IN PAST 5 YEARS    Drug use: No    Sexual activity: Not Currently   Social History Narrative    Medical History: Polypectomy-tubulovillous adenoma ? may 13 2013 chest CT negative    Gyn History: Last mammogram date 2013. Last menstrual perioddate . OB History: Total pregnancies 0. Surgical History: Denies Past Surgical History    Hospitalization/Major Diagnostic Procedure: syncope     Family History: Mother:  80 yrs, heart diseaseFather:  62 yrs, MISister(s): aliveBrother(s): alive, 1 :    strokeGrandfather: alive2 brother(s) , 1 sister(s) . Social History: Alcohol Use Patient does not use alcohol. Smoking Status Patient is a  smoker, Quit in: 13 patient    fell off the leg start his cigarettes but assures me that his day she w ill never smoked another cigarette the rest of her life. Marital Status:    . Lives w ith: alone. Education/School: has completed 3 yrs college. 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 Family History   Problem Relation Age of Onset    Heart Disease Mother         PVD LEG AMPUTATION    Deep Vein Thrombosis Mother     Hypertension Mother     Alzheimer Mother     Hearing Impairment Father     Hypertension Father     Heart Failure Father     Stroke Father     Heart Attack Father     Diabetes Sister     Diabetes Brother     Heart Disease Brother         VALVULAR DISEASE    Anesth Problems Neg Hx        OBJECTIVE:    Visit Vitals  /63   Pulse 60   Temp 97.7 °F (36.5 °C) (Oral)   Resp 16   Ht 5' (1.524 m)   Wt 140 lb (63.5 kg)   SpO2 96%   BMI 27.34 kg/m²     CONSTITUTIONAL: well , well nourished, appears age appropriate  EYES: perrla, eom intact  ENMT:moist mucous membranes, pharynx clear  NECK: supple. Thyroid normal  RESPIRATORY: Chest: clear bilaterally   CARDIOVASCULAR: Heart: regular rate and rhythm  GASTROINTESTINAL: Abdomen: soft, bowel sounds active  HEMATOLOGIC: no pathological lymph nodes palpated  MUSCULOSKELETAL: Extremities: no edema, pulse 1+   INTEGUMENT: No unusual rashes or suspicious skin lesions noted. Nails appear normal.  NEUROLOGIC: non-focal exam   MENTAL STATUS: alert and oriented, appropriate affect           ASSESSMENT:  1. Essential hypertension    2. Cigarette smoker    3. Chronic obstructive pulmonary disease, unspecified COPD type ([address]    4. Primary osteoarthritis of left hip    5. Anemia, unspecified type    6. Hip pain, chronic, right      Repeat BP is at goal, 132/78, no adjustments will be made. She is down to two cigarettes, which is commendable. She hopes to be off completely by the next visit or next year. She has COPD related to cigarette abuse. She has osteoarthritis of the left hip and is S/P THR by Dr. Uriel Aguilar last year. She now complains of pain in her right hip, which I suspect is the same process. Will x-ray the hip and send her to the orthopedic physician.   She will be back to see us in four months, sooner if she has any problems. I have discussed the diagnosis with the patient and the intended plan as seen in the  orders above. The patient understands and agees with the plan. The patient has   received an after visit summary and questions were answered concerning  future plans  Patient labs and/or xrays were reviewed  Past records were reviewed. PLAN:  .  Orders Placed This Encounter    XR HIP RT W OR WO PELV 2-3 VWS    REFERRAL TO ORTHOPEDICS       Follow-up and Dispositions    · Return in about 4 months (around 4/6/2020). ATTENTION:   This medical record was transcribed using an electronic medical records system. Although proofread, it may and can contain electronic and spelling errors. Other human spelling and other errors may be present. Corrections may be executed at a later time. Please feel free to contact us for any clarifications as needed.

## 2019-12-10 RX ORDER — LEVOTHYROXINE SODIUM 25 UG/1
TABLET ORAL
Qty: 90 TAB | Refills: 0 | Status: SHIPPED | OUTPATIENT
Start: 2019-12-10 | End: 2020-06-18

## 2020-01-27 ENCOUNTER — HOSPITAL ENCOUNTER (OUTPATIENT)
Dept: PREADMISSION TESTING | Age: 80
Discharge: HOME | End: 2020-01-27
Payer: MEDICARE

## 2020-01-27 VITALS
DIASTOLIC BLOOD PRESSURE: 69 MMHG | SYSTOLIC BLOOD PRESSURE: 126 MMHG | HEART RATE: 65 BPM | WEIGHT: 138 LBS | TEMPERATURE: 97.8 F | BODY MASS INDEX: 26.06 KG/M2 | HEIGHT: 61 IN

## 2020-01-27 LAB
ABO + RH BLD: NORMAL
ANION GAP SERPL CALC-SCNC: 2 MMOL/L (ref 5–15)
APPEARANCE UR: CLEAR
ATRIAL RATE: 54 BPM
BACTERIA URNS QL MICRO: ABNORMAL /HPF
BILIRUB UR QL CFM: NEGATIVE
BLOOD GROUP ANTIBODIES SERPL: NORMAL
BUN SERPL-MCNC: 24 MG/DL (ref 6–20)
BUN/CREAT SERPL: 28 (ref 12–20)
CALCIUM SERPL-MCNC: 9.2 MG/DL (ref 8.5–10.1)
CALCULATED P AXIS, ECG09: 38 DEGREES
CALCULATED R AXIS, ECG10: -40 DEGREES
CALCULATED T AXIS, ECG11: 3 DEGREES
CHLORIDE SERPL-SCNC: 110 MMOL/L (ref 97–108)
CO2 SERPL-SCNC: 26 MMOL/L (ref 21–32)
COLOR UR: ABNORMAL
CREAT SERPL-MCNC: 0.87 MG/DL (ref 0.55–1.02)
DIAGNOSIS, 93000: NORMAL
EPITH CASTS URNS QL MICRO: ABNORMAL /LPF
ERYTHROCYTE [DISTWIDTH] IN BLOOD BY AUTOMATED COUNT: 13.2 % (ref 11.5–14.5)
EST. AVERAGE GLUCOSE BLD GHB EST-MCNC: 123 MG/DL
GLUCOSE SERPL-MCNC: 89 MG/DL (ref 65–100)
GLUCOSE UR STRIP.AUTO-MCNC: NEGATIVE MG/DL
HBA1C MFR BLD: 5.9 % (ref 4–5.6)
HCT VFR BLD AUTO: 38.4 % (ref 35–47)
HGB BLD-MCNC: 11.6 G/DL (ref 11.5–16)
HGB UR QL STRIP: NEGATIVE
INR PPP: 1.1 (ref 0.9–1.1)
KETONES UR QL STRIP.AUTO: ABNORMAL MG/DL
LEUKOCYTE ESTERASE UR QL STRIP.AUTO: ABNORMAL
MCH RBC QN AUTO: 29.5 PG (ref 26–34)
MCHC RBC AUTO-ENTMCNC: 30.2 G/DL (ref 30–36.5)
MCV RBC AUTO: 97.7 FL (ref 80–99)
NITRITE UR QL STRIP.AUTO: NEGATIVE
NRBC # BLD: 0 K/UL (ref 0–0.01)
NRBC BLD-RTO: 0 PER 100 WBC
P-R INTERVAL, ECG05: 162 MS
PH UR STRIP: 5 [PH] (ref 5–8)
PLATELET # BLD AUTO: 213 K/UL (ref 150–400)
PMV BLD AUTO: 11.2 FL (ref 8.9–12.9)
POTASSIUM SERPL-SCNC: 3.8 MMOL/L (ref 3.5–5.1)
PROT UR STRIP-MCNC: 30 MG/DL
PROTHROMBIN TIME: 10.8 SEC (ref 9–11.1)
Q-T INTERVAL, ECG07: 456 MS
QRS DURATION, ECG06: 92 MS
QTC CALCULATION (BEZET), ECG08: 432 MS
RBC # BLD AUTO: 3.93 M/UL (ref 3.8–5.2)
RBC #/AREA URNS HPF: ABNORMAL /HPF (ref 0–5)
SODIUM SERPL-SCNC: 138 MMOL/L (ref 136–145)
SP GR UR REFRACTOMETRY: 1.02
SPECIMEN EXP DATE BLD: NORMAL
UA: UC IF INDICATED,UAUC: ABNORMAL
UROBILINOGEN UR QL STRIP.AUTO: 1 EU/DL (ref 0.2–1)
VENTRICULAR RATE, ECG03: 54 BPM
WBC # BLD AUTO: 4.5 K/UL (ref 3.6–11)
WBC URNS QL MICRO: ABNORMAL /HPF (ref 0–4)

## 2020-01-27 PROCEDURE — 81001 URINALYSIS AUTO W/SCOPE: CPT

## 2020-01-27 PROCEDURE — 83036 HEMOGLOBIN GLYCOSYLATED A1C: CPT

## 2020-01-27 PROCEDURE — 86900 BLOOD TYPING SEROLOGIC ABO: CPT

## 2020-01-27 PROCEDURE — 85027 COMPLETE CBC AUTOMATED: CPT

## 2020-01-27 PROCEDURE — 36415 COLL VENOUS BLD VENIPUNCTURE: CPT

## 2020-01-27 PROCEDURE — 80048 BASIC METABOLIC PNL TOTAL CA: CPT

## 2020-01-27 PROCEDURE — 87086 URINE CULTURE/COLONY COUNT: CPT

## 2020-01-27 PROCEDURE — 85610 PROTHROMBIN TIME: CPT

## 2020-01-27 PROCEDURE — 93005 ELECTROCARDIOGRAM TRACING: CPT

## 2020-01-27 RX ORDER — DEXTROMETHORPHAN HYDROBROMIDE, GUAIFENESIN 5; 100 MG/5ML; MG/5ML
650 LIQUID ORAL AS NEEDED
COMMUNITY

## 2020-01-27 RX ORDER — ASPIRIN 81 MG/1
81 TABLET ORAL
COMMUNITY
End: 2020-02-07

## 2020-01-28 LAB
BACTERIA SPEC CULT: NORMAL
BACTERIA SPEC CULT: NORMAL
SERVICE CMNT-IMP: NORMAL

## 2020-01-29 LAB
BACTERIA SPEC CULT: ABNORMAL
BACTERIA SPEC CULT: ABNORMAL
CC UR VC: ABNORMAL
SERVICE CMNT-IMP: ABNORMAL

## 2020-01-31 PROBLEM — M16.11 PRIMARY LOCALIZED OSTEOARTHRITIS OF RIGHT HIP: Status: ACTIVE | Noted: 2020-01-31

## 2020-01-31 NOTE — H&P
Patient ID: Montserrat Miles is a 78 y.o. female.     Chief Complaint: Pain of the Right Hip        Montserrat Miles is a 78 y.o. female who returns for follow up of her RIGHT hip. Patient was last seen in clinic on 10/19/18 when she elected to try a injection for her RIGHT hip before scheduling surgery. Since then she had a steroid injection in the RIGHT hip, but this did not provide her with any significant relief. She continues having significant pain in the hip that interferes with her ability to get around. She states that her LEFT hip has been doing great since her total hip replacement on 7/11/18. She denies any history of cardiac disease. She voices no other complaints at this time.        Review of Systems   12/16/2019     Constitutional: Unexplained: Positive  Genitourinary: Frequent Urination: Positive  HEENT: Vision Loss: Negative  Neurological: Memory Loss: Negative  Integumentary: Rash: Negative  Cardiovascular: Palpatations: Negative  Hematologic: Bruises/Bleeds Easily: Negative  Gastrointestinal: Constipation: Negative  Immunological: Seasonal Allergies: Negative  Musculoskeletal: Joint Pain: Positive     Medical History        Past Medical History:   Diagnosis Date    Arthritis, rheumatoid      COPD (chronic obstructive pulmonary disease)      Gout      Hypertension              Surgical History         Past Surgical History:   Procedure Laterality Date    NO RELEVANT ORTHOPAEDIC SURGERIES        NO RELEVANT SURGERIES                Objective:          Vitals:     12/16/19 0918   BP: 126/77         Constitutional:  No acute distress. Well nourished. Well developed. Presents with a walking cane. Eyes:  Sclera are nonicteric. Respiratory:  No labored breathing. Cardiovascular:  No marked edema. Skin:  No marked skin ulcers. Neurological:  No marked sensory loss noted. Psychiatric: Alert and oriented x3. Musculoskeletal      Examination of the RIGHT hip reveals 80% reduction in rotation with pain. Skin intact. No effusion. No sign of infection. No ecchymosis or erythema. No cellulitis or rash. No calf pain, swelling, or other evidence of DVT. I detect no obvious motor or sensory deficits in the lower extremity. The neurovascular status is intact.     Imaging/Studies:    Order: XR HIP 2+ VW RIGHT - Indication: Primary osteoarthritis of right   hip        X-ray Hip Right 2-3 Views W/pelvis When Performed (22575)     Result Date: 12/16/2019  AP Pelvis, Frog Lateral.      Impression: I ordered and interpreted X-rays of the pelvis which reveal LEFT total hip components in good position with no evidence of loosening or complicating features. Complete loss of joint space in RIGHT hip with a large chandni-acetabular osteophyte. No gross pelvic abnormalities. Degenerative changes noted in the lower lumbar spine. No fractures or evidence of metastatic disease.           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 Primary osteoarthritis of right hip M16.11         Plan:   I personally reviewed my findings with the patient and their family. I reviewed the pathophysiology and explained that she has essentially complete loss of cartilage in her RIGHT hip. Reviewed treatment options. Explained that total knee replacement is the only thing I think will provide her with any significant relief. I reviewed the hospitalization as well as post-op course and rehabilitation for total hip replacement.  We discussed all complications associated with the surgery, including the chance of infection and thromboembolic disease. She understands that she would be on antibiotics and anti-coagulants following surgery to prevent infection and DVT. I discussed issues of leg length and instability. I discussed the chance of dislocation following total hip replacement and instructed her on total hip precautions    PROCEDURE: Right total hip replacement. Date of Surgery Update:  Gely Calvillo was seen and examined. Past Medical History:   Diagnosis Date    Arthritis     Chronically on opiate therapy     Cigarette smoker     ct 6/25/13    COPD (chronic obstructive pulmonary disease) (HCC)     Elevated cholesterol     Epicondylitis elbow, medial, right     High cholesterol     Hip pain, chronic, right hip pain    Hypertension     Hypothyroid     IGT (impaired glucose tolerance) 11/13/2014    Onychomycosis     S/P colonoscopy 12-12-13    cecal lipoma    S/P colonoscopy 12/12/2013    hemorrhoids,diverticulosis    Tubulovillous adenoma of colon 2008     Prior to Admission Medications   Prescriptions Last Dose Informant Patient Reported? Taking?   acetaminophen (TYLENOL ARTHRITIS PAIN) 650 mg TbER   Yes No   Sig: Take 650 mg by mouth as needed for Pain. amLODIPine (NORVASC) 5 mg tablet   No No   Sig: take 1 tablet by mouth once daily   Patient taking differently: 5 mg Daily (before breakfast). take 1 tablet by mouth once daily   aspirin delayed-release 81 mg tablet   Yes No   Sig: Take 81 mg by mouth nightly. celecoxib (CELEBREX) 200 mg capsule   No No   Sig: take 1 capsule by mouth twice a day PRN   levothyroxine (SYNTHROID) 25 mcg tablet   No No   Sig: take 1 tablet by mouth once daily BEFORE BREAKFAST.   simvastatin (ZOCOR) 40 mg tablet   No No   Sig: take 1 tablet by mouth NIGHTLY      Facility-Administered Medications: None      Allergy to:Bee venom protein (honey bee);  Flu vac 2018 [id number](pf); and Lisinopril  Physical Examination: General appearance - alert, well appearing, and in no distress  History and physical has been reviewed. The patient has been examined.  There have been no significant clinical changes since the completion of the originally dated History and Physical.    Signed By: Osvaldo Ulloa PA-C     February 5, 2020 7:44 AM

## 2020-02-04 ENCOUNTER — ANESTHESIA EVENT (OUTPATIENT)
Dept: SURGERY | Age: 80
DRG: 470 | End: 2020-02-04
Payer: MEDICARE

## 2020-02-05 ENCOUNTER — APPOINTMENT (OUTPATIENT)
Dept: GENERAL RADIOLOGY | Age: 80
DRG: 470 | End: 2020-02-05
Attending: ORTHOPAEDIC SURGERY
Payer: MEDICARE

## 2020-02-05 ENCOUNTER — ANESTHESIA (OUTPATIENT)
Dept: SURGERY | Age: 80
DRG: 470 | End: 2020-02-05
Payer: MEDICARE

## 2020-02-05 ENCOUNTER — HOSPITAL ENCOUNTER (INPATIENT)
Age: 80
LOS: 2 days | Discharge: NURSING, REHAB OR LONG-TERM CARE | DRG: 470 | End: 2020-02-07
Attending: ORTHOPAEDIC SURGERY | Admitting: ORTHOPAEDIC SURGERY
Payer: MEDICARE

## 2020-02-05 DIAGNOSIS — M16.11 PRIMARY LOCALIZED OSTEOARTHRITIS OF RIGHT HIP: Primary | ICD-10-CM

## 2020-02-05 LAB
GLUCOSE BLD STRIP.AUTO-MCNC: 92 MG/DL (ref 65–100)
SERVICE CMNT-IMP: NORMAL

## 2020-02-05 PROCEDURE — 74011000258 HC RX REV CODE- 258: Performed by: NURSE ANESTHETIST, CERTIFIED REGISTERED

## 2020-02-05 PROCEDURE — 74011250637 HC RX REV CODE- 250/637: Performed by: PHYSICIAN ASSISTANT

## 2020-02-05 PROCEDURE — 77030011640 HC PAD GRND REM COVD -A: Performed by: ORTHOPAEDIC SURGERY

## 2020-02-05 PROCEDURE — 77030012893

## 2020-02-05 PROCEDURE — 74011000250 HC RX REV CODE- 250: Performed by: NURSE ANESTHETIST, CERTIFIED REGISTERED

## 2020-02-05 PROCEDURE — 73501 X-RAY EXAM HIP UNI 1 VIEW: CPT

## 2020-02-05 PROCEDURE — 77030018846 HC SOL IRR STRL H20 ICUM -A: Performed by: ORTHOPAEDIC SURGERY

## 2020-02-05 PROCEDURE — 77030014125 HC TY EPDRL BBMI -B: Performed by: NURSE ANESTHETIST, CERTIFIED REGISTERED

## 2020-02-05 PROCEDURE — 74011000250 HC RX REV CODE- 250: Performed by: ANESTHESIOLOGY

## 2020-02-05 PROCEDURE — 74011250636 HC RX REV CODE- 250/636: Performed by: NURSE ANESTHETIST, CERTIFIED REGISTERED

## 2020-02-05 PROCEDURE — 77010033678 HC OXYGEN DAILY

## 2020-02-05 PROCEDURE — 77030018836 HC SOL IRR NACL ICUM -A: Performed by: ORTHOPAEDIC SURGERY

## 2020-02-05 PROCEDURE — 76010000172 HC OR TIME 2.5 TO 3 HR INTENSV-TIER 1: Performed by: ORTHOPAEDIC SURGERY

## 2020-02-05 PROCEDURE — 97116 GAIT TRAINING THERAPY: CPT

## 2020-02-05 PROCEDURE — 76210000006 HC OR PH I REC 0.5 TO 1 HR: Performed by: ORTHOPAEDIC SURGERY

## 2020-02-05 PROCEDURE — 77030008462 HC STPLR SKN PROX J&J -A: Performed by: ORTHOPAEDIC SURGERY

## 2020-02-05 PROCEDURE — 77030020268 HC MISC GENERAL SUPPLY: Performed by: ORTHOPAEDIC SURGERY

## 2020-02-05 PROCEDURE — 74011250636 HC RX REV CODE- 250/636: Performed by: ANESTHESIOLOGY

## 2020-02-05 PROCEDURE — C1776 JOINT DEVICE (IMPLANTABLE): HCPCS | Performed by: ORTHOPAEDIC SURGERY

## 2020-02-05 PROCEDURE — 77030040922 HC BLNKT HYPOTHRM STRY -A

## 2020-02-05 PROCEDURE — 65270000029 HC RM PRIVATE

## 2020-02-05 PROCEDURE — 0SR901A REPLACEMENT OF RIGHT HIP JOINT WITH METAL SYNTHETIC SUBSTITUTE, UNCEMENTED, OPEN APPROACH: ICD-10-PCS | Performed by: ORTHOPAEDIC SURGERY

## 2020-02-05 PROCEDURE — 51798 US URINE CAPACITY MEASURE: CPT

## 2020-02-05 PROCEDURE — 74011250636 HC RX REV CODE- 250/636: Performed by: PHYSICIAN ASSISTANT

## 2020-02-05 PROCEDURE — 74011000272 HC RX REV CODE- 272: Performed by: PHYSICIAN ASSISTANT

## 2020-02-05 PROCEDURE — 77030031139 HC SUT VCRL2 J&J -A: Performed by: ORTHOPAEDIC SURGERY

## 2020-02-05 PROCEDURE — 97110 THERAPEUTIC EXERCISES: CPT

## 2020-02-05 PROCEDURE — 74011000250 HC RX REV CODE- 250

## 2020-02-05 PROCEDURE — 97161 PT EVAL LOW COMPLEX 20 MIN: CPT

## 2020-02-05 PROCEDURE — 77030003671 HC NDL SPN HAVL -B: Performed by: NURSE ANESTHETIST, CERTIFIED REGISTERED

## 2020-02-05 PROCEDURE — 77030041397 HC DRSG PRIMASEAL AG MDII -B: Performed by: ORTHOPAEDIC SURGERY

## 2020-02-05 PROCEDURE — 76060000036 HC ANESTHESIA 2.5 TO 3 HR: Performed by: ORTHOPAEDIC SURGERY

## 2020-02-05 PROCEDURE — 82962 GLUCOSE BLOOD TEST: CPT

## 2020-02-05 PROCEDURE — 77030035236 HC SUT PDS STRATFX BARB J&J -B: Performed by: ORTHOPAEDIC SURGERY

## 2020-02-05 PROCEDURE — 74011000250 HC RX REV CODE- 250: Performed by: PHYSICIAN ASSISTANT

## 2020-02-05 PROCEDURE — 74011250637 HC RX REV CODE- 250/637: Performed by: ANESTHESIOLOGY

## 2020-02-05 PROCEDURE — 77030005513 HC CATH URETH FOL11 MDII -B: Performed by: ORTHOPAEDIC SURGERY

## 2020-02-05 PROCEDURE — 77030040361 HC SLV COMPR DVT MDII -B: Performed by: ORTHOPAEDIC SURGERY

## 2020-02-05 PROCEDURE — 77030018673: Performed by: ORTHOPAEDIC SURGERY

## 2020-02-05 PROCEDURE — 77030011264 HC ELECTRD BLD EXT COVD -A: Performed by: ORTHOPAEDIC SURGERY

## 2020-02-05 PROCEDURE — 77030006822 HC BLD SAW SAG BRSM -B: Performed by: ORTHOPAEDIC SURGERY

## 2020-02-05 PROCEDURE — 94760 N-INVAS EAR/PLS OXIMETRY 1: CPT

## 2020-02-05 DEVICE — SUMMIT FEMORAL STEM 12/14 TAPER TAPER ED W/POROCOAT SIZE 5 STD 145MM
Type: IMPLANTABLE DEVICE | Site: HIP | Status: FUNCTIONAL
Brand: SUMMIT POROCOAT

## 2020-02-05 DEVICE — COMPONENT TOT HIP PRIMARY MTL ON POLYETH: Type: IMPLANTABLE DEVICE | Status: FUNCTIONAL

## 2020-02-05 DEVICE — M-SPEC METAL FEMORAL HEAD 12/14 TAPER DIAMETER 36MM -2: Type: IMPLANTABLE DEVICE | Site: HIP | Status: FUNCTIONAL

## 2020-02-05 DEVICE — PINNACLE HIP SOLUTIONS ALTRX POLYETHYLENE ACETABULAR LINER NEUTRAL 36MM ID 52MM OD
Type: IMPLANTABLE DEVICE | Site: HIP | Status: FUNCTIONAL
Brand: PINNACLE ALTRX

## 2020-02-05 DEVICE — PINNACLE CANCELLOUS BONE SCREW 6.5MM X 15MM
Type: IMPLANTABLE DEVICE | Site: HIP | Status: FUNCTIONAL
Brand: PINNACLE

## 2020-02-05 DEVICE — PINNACLE GRIPTION ACETABULAR SHELL SECTOR 52MM OD
Type: IMPLANTABLE DEVICE | Site: HIP | Status: FUNCTIONAL
Brand: PINNACLE GRIPTION

## 2020-02-05 RX ORDER — LIDOCAINE HYDROCHLORIDE 10 MG/ML
0.1 INJECTION, SOLUTION EPIDURAL; INFILTRATION; INTRACAUDAL; PERINEURAL AS NEEDED
Status: DISCONTINUED | OUTPATIENT
Start: 2020-02-05 | End: 2020-02-05 | Stop reason: HOSPADM

## 2020-02-05 RX ORDER — ONDANSETRON 2 MG/ML
INJECTION INTRAMUSCULAR; INTRAVENOUS AS NEEDED
Status: DISCONTINUED | OUTPATIENT
Start: 2020-02-05 | End: 2020-02-05

## 2020-02-05 RX ORDER — ROPIVACAINE HYDROCHLORIDE 5 MG/ML
150 INJECTION, SOLUTION EPIDURAL; INFILTRATION; PERINEURAL AS NEEDED
Status: DISCONTINUED | OUTPATIENT
Start: 2020-02-05 | End: 2020-02-05 | Stop reason: HOSPADM

## 2020-02-05 RX ORDER — BUPIVACAINE HYDROCHLORIDE 5 MG/ML
INJECTION, SOLUTION EPIDURAL; INTRACAUDAL
Status: COMPLETED | OUTPATIENT
Start: 2020-02-05 | End: 2020-02-05

## 2020-02-05 RX ORDER — PROPOFOL 10 MG/ML
INJECTION, EMULSION INTRAVENOUS
Status: DISCONTINUED | OUTPATIENT
Start: 2020-02-05 | End: 2020-02-05 | Stop reason: HOSPADM

## 2020-02-05 RX ORDER — HYDROMORPHONE HYDROCHLORIDE 1 MG/ML
0.2 INJECTION, SOLUTION INTRAMUSCULAR; INTRAVENOUS; SUBCUTANEOUS
Status: ACTIVE | OUTPATIENT
Start: 2020-02-05 | End: 2020-02-05

## 2020-02-05 RX ORDER — EPHEDRINE SULFATE/0.9% NACL/PF 50 MG/5 ML
SYRINGE (ML) INTRAVENOUS
Status: COMPLETED
Start: 2020-02-05 | End: 2020-02-05

## 2020-02-05 RX ORDER — FAMOTIDINE 20 MG/1
20 TABLET, FILM COATED ORAL
Status: DISCONTINUED | OUTPATIENT
Start: 2020-02-05 | End: 2020-02-08 | Stop reason: HOSPADM

## 2020-02-05 RX ORDER — SODIUM CHLORIDE 9 MG/ML
25 INJECTION, SOLUTION INTRAVENOUS CONTINUOUS
Status: DISCONTINUED | OUTPATIENT
Start: 2020-02-05 | End: 2020-02-05 | Stop reason: HOSPADM

## 2020-02-05 RX ORDER — SIMVASTATIN 40 MG/1
40 TABLET, FILM COATED ORAL
Status: DISCONTINUED | OUTPATIENT
Start: 2020-02-06 | End: 2020-02-08 | Stop reason: HOSPADM

## 2020-02-05 RX ORDER — HYDROXYZINE HYDROCHLORIDE 10 MG/1
10 TABLET, FILM COATED ORAL
Status: DISCONTINUED | OUTPATIENT
Start: 2020-02-05 | End: 2020-02-08 | Stop reason: HOSPADM

## 2020-02-05 RX ORDER — ONDANSETRON 2 MG/ML
4 INJECTION INTRAMUSCULAR; INTRAVENOUS AS NEEDED
Status: DISCONTINUED | OUTPATIENT
Start: 2020-02-05 | End: 2020-02-05 | Stop reason: HOSPADM

## 2020-02-05 RX ORDER — MORPHINE SULFATE 10 MG/ML
2 INJECTION, SOLUTION INTRAMUSCULAR; INTRAVENOUS
Status: DISCONTINUED | OUTPATIENT
Start: 2020-02-05 | End: 2020-02-05 | Stop reason: HOSPADM

## 2020-02-05 RX ORDER — DIPHENHYDRAMINE HYDROCHLORIDE 50 MG/ML
12.5 INJECTION, SOLUTION INTRAMUSCULAR; INTRAVENOUS AS NEEDED
Status: DISCONTINUED | OUTPATIENT
Start: 2020-02-05 | End: 2020-02-05 | Stop reason: HOSPADM

## 2020-02-05 RX ORDER — OXYCODONE HYDROCHLORIDE 5 MG/1
5 TABLET ORAL AS NEEDED
Status: DISCONTINUED | OUTPATIENT
Start: 2020-02-05 | End: 2020-02-05 | Stop reason: HOSPADM

## 2020-02-05 RX ORDER — SODIUM CHLORIDE 0.9 % (FLUSH) 0.9 %
5-40 SYRINGE (ML) INJECTION AS NEEDED
Status: DISCONTINUED | OUTPATIENT
Start: 2020-02-05 | End: 2020-02-08 | Stop reason: HOSPADM

## 2020-02-05 RX ORDER — ACETAMINOPHEN 325 MG/1
650 TABLET ORAL ONCE
Status: COMPLETED | OUTPATIENT
Start: 2020-02-05 | End: 2020-02-05

## 2020-02-05 RX ORDER — EPHEDRINE SULFATE/0.9% NACL/PF 50 MG/5 ML
5 SYRINGE (ML) INTRAVENOUS AS NEEDED
Status: DISCONTINUED | OUTPATIENT
Start: 2020-02-05 | End: 2020-02-05 | Stop reason: HOSPADM

## 2020-02-05 RX ORDER — SODIUM CHLORIDE, SODIUM LACTATE, POTASSIUM CHLORIDE, CALCIUM CHLORIDE 600; 310; 30; 20 MG/100ML; MG/100ML; MG/100ML; MG/100ML
1000 INJECTION, SOLUTION INTRAVENOUS CONTINUOUS
Status: DISCONTINUED | OUTPATIENT
Start: 2020-02-05 | End: 2020-02-05 | Stop reason: HOSPADM

## 2020-02-05 RX ORDER — EPHEDRINE SULFATE/0.9% NACL/PF 50 MG/5 ML
SYRINGE (ML) INTRAVENOUS AS NEEDED
Status: DISCONTINUED | OUTPATIENT
Start: 2020-02-05 | End: 2020-02-05 | Stop reason: HOSPADM

## 2020-02-05 RX ORDER — OXYCODONE HYDROCHLORIDE 5 MG/1
5 TABLET ORAL
Status: DISCONTINUED | OUTPATIENT
Start: 2020-02-05 | End: 2020-02-08 | Stop reason: HOSPADM

## 2020-02-05 RX ORDER — CEFAZOLIN SODIUM/WATER 2 G/20 ML
2 SYRINGE (ML) INTRAVENOUS EVERY 8 HOURS
Status: COMPLETED | OUTPATIENT
Start: 2020-02-05 | End: 2020-02-06

## 2020-02-05 RX ORDER — ACETAMINOPHEN 325 MG/1
650 TABLET ORAL
Status: DISCONTINUED | OUTPATIENT
Start: 2020-02-05 | End: 2020-02-08 | Stop reason: HOSPADM

## 2020-02-05 RX ORDER — OXYCODONE HYDROCHLORIDE 5 MG/1
10 TABLET ORAL
Status: DISCONTINUED | OUTPATIENT
Start: 2020-02-05 | End: 2020-02-08 | Stop reason: HOSPADM

## 2020-02-05 RX ORDER — BUPIVACAINE HYDROCHLORIDE 5 MG/ML
INJECTION, SOLUTION EPIDURAL; INTRACAUDAL AS NEEDED
Status: DISCONTINUED | OUTPATIENT
Start: 2020-02-05 | End: 2020-02-05

## 2020-02-05 RX ORDER — MIDAZOLAM HYDROCHLORIDE 1 MG/ML
0.5 INJECTION, SOLUTION INTRAMUSCULAR; INTRAVENOUS
Status: DISCONTINUED | OUTPATIENT
Start: 2020-02-05 | End: 2020-02-05 | Stop reason: HOSPADM

## 2020-02-05 RX ORDER — MIDAZOLAM HYDROCHLORIDE 1 MG/ML
1 INJECTION, SOLUTION INTRAMUSCULAR; INTRAVENOUS AS NEEDED
Status: DISCONTINUED | OUTPATIENT
Start: 2020-02-05 | End: 2020-02-05 | Stop reason: HOSPADM

## 2020-02-05 RX ORDER — FENTANYL CITRATE 50 UG/ML
INJECTION, SOLUTION INTRAMUSCULAR; INTRAVENOUS AS NEEDED
Status: DISCONTINUED | OUTPATIENT
Start: 2020-02-05 | End: 2020-02-05 | Stop reason: HOSPADM

## 2020-02-05 RX ORDER — NALOXONE HYDROCHLORIDE 0.4 MG/ML
0.4 INJECTION, SOLUTION INTRAMUSCULAR; INTRAVENOUS; SUBCUTANEOUS AS NEEDED
Status: DISCONTINUED | OUTPATIENT
Start: 2020-02-05 | End: 2020-02-08 | Stop reason: HOSPADM

## 2020-02-05 RX ORDER — CELECOXIB 200 MG/1
200 CAPSULE ORAL EVERY 12 HOURS
Status: DISCONTINUED | OUTPATIENT
Start: 2020-02-05 | End: 2020-02-08 | Stop reason: HOSPADM

## 2020-02-05 RX ORDER — PROPOFOL 10 MG/ML
INJECTION, EMULSION INTRAVENOUS AS NEEDED
Status: DISCONTINUED | OUTPATIENT
Start: 2020-02-05 | End: 2020-02-05 | Stop reason: HOSPADM

## 2020-02-05 RX ORDER — LEVOTHYROXINE SODIUM 50 UG/1
25 TABLET ORAL
Status: DISCONTINUED | OUTPATIENT
Start: 2020-02-06 | End: 2020-02-08 | Stop reason: HOSPADM

## 2020-02-05 RX ORDER — SODIUM CHLORIDE, SODIUM LACTATE, POTASSIUM CHLORIDE, CALCIUM CHLORIDE 600; 310; 30; 20 MG/100ML; MG/100ML; MG/100ML; MG/100ML
INJECTION, SOLUTION INTRAVENOUS
Status: DISCONTINUED | OUTPATIENT
Start: 2020-02-05 | End: 2020-02-05 | Stop reason: HOSPADM

## 2020-02-05 RX ORDER — FENTANYL CITRATE 50 UG/ML
25 INJECTION, SOLUTION INTRAMUSCULAR; INTRAVENOUS
Status: DISCONTINUED | OUTPATIENT
Start: 2020-02-05 | End: 2020-02-05 | Stop reason: HOSPADM

## 2020-02-05 RX ORDER — ACETAMINOPHEN 325 MG/1
650 TABLET ORAL EVERY 6 HOURS
Status: DISCONTINUED | OUTPATIENT
Start: 2020-02-05 | End: 2020-02-08 | Stop reason: HOSPADM

## 2020-02-05 RX ORDER — AMOXICILLIN 250 MG
1 CAPSULE ORAL 2 TIMES DAILY
Status: DISCONTINUED | OUTPATIENT
Start: 2020-02-05 | End: 2020-02-08 | Stop reason: HOSPADM

## 2020-02-05 RX ORDER — HYDROMORPHONE HYDROCHLORIDE 1 MG/ML
0.5 INJECTION, SOLUTION INTRAMUSCULAR; INTRAVENOUS; SUBCUTANEOUS
Status: ACTIVE | OUTPATIENT
Start: 2020-02-05 | End: 2020-02-06

## 2020-02-05 RX ORDER — EPHEDRINE SULFATE/0.9% NACL/PF 50 MG/5 ML
10 SYRINGE (ML) INTRAVENOUS ONCE
Status: COMPLETED | OUTPATIENT
Start: 2020-02-05 | End: 2020-02-05

## 2020-02-05 RX ORDER — AMLODIPINE BESYLATE 5 MG/1
5 TABLET ORAL
Status: DISCONTINUED | OUTPATIENT
Start: 2020-02-06 | End: 2020-02-08 | Stop reason: HOSPADM

## 2020-02-05 RX ORDER — SODIUM CHLORIDE 9 MG/ML
125 INJECTION, SOLUTION INTRAVENOUS CONTINUOUS
Status: DISPENSED | OUTPATIENT
Start: 2020-02-05 | End: 2020-02-06

## 2020-02-05 RX ORDER — DEXAMETHASONE SODIUM PHOSPHATE 4 MG/ML
INJECTION, SOLUTION INTRA-ARTICULAR; INTRALESIONAL; INTRAMUSCULAR; INTRAVENOUS; SOFT TISSUE AS NEEDED
Status: DISCONTINUED | OUTPATIENT
Start: 2020-02-05 | End: 2020-02-05 | Stop reason: HOSPADM

## 2020-02-05 RX ORDER — FACIAL-BODY WIPES
10 EACH TOPICAL DAILY PRN
Status: DISCONTINUED | OUTPATIENT
Start: 2020-02-07 | End: 2020-02-08 | Stop reason: HOSPADM

## 2020-02-05 RX ORDER — FENTANYL CITRATE 50 UG/ML
50 INJECTION, SOLUTION INTRAMUSCULAR; INTRAVENOUS AS NEEDED
Status: DISCONTINUED | OUTPATIENT
Start: 2020-02-05 | End: 2020-02-05 | Stop reason: HOSPADM

## 2020-02-05 RX ORDER — LIDOCAINE HYDROCHLORIDE 20 MG/ML
INJECTION, SOLUTION EPIDURAL; INFILTRATION; INTRACAUDAL; PERINEURAL AS NEEDED
Status: DISCONTINUED | OUTPATIENT
Start: 2020-02-05 | End: 2020-02-05 | Stop reason: HOSPADM

## 2020-02-05 RX ORDER — SODIUM CHLORIDE, SODIUM LACTATE, POTASSIUM CHLORIDE, CALCIUM CHLORIDE 600; 310; 30; 20 MG/100ML; MG/100ML; MG/100ML; MG/100ML
100 INJECTION, SOLUTION INTRAVENOUS CONTINUOUS
Status: DISCONTINUED | OUTPATIENT
Start: 2020-02-05 | End: 2020-02-05 | Stop reason: HOSPADM

## 2020-02-05 RX ORDER — CELECOXIB 200 MG/1
200 CAPSULE ORAL ONCE
Status: COMPLETED | OUTPATIENT
Start: 2020-02-05 | End: 2020-02-05

## 2020-02-05 RX ORDER — ONDANSETRON 4 MG/1
4 TABLET, ORALLY DISINTEGRATING ORAL
Status: DISCONTINUED | OUTPATIENT
Start: 2020-02-05 | End: 2020-02-08 | Stop reason: HOSPADM

## 2020-02-05 RX ORDER — CEFAZOLIN SODIUM/WATER 2 G/20 ML
2 SYRINGE (ML) INTRAVENOUS EVERY 8 HOURS
Status: DISCONTINUED | OUTPATIENT
Start: 2020-02-05 | End: 2020-02-05 | Stop reason: HOSPADM

## 2020-02-05 RX ORDER — SODIUM CHLORIDE 0.9 % (FLUSH) 0.9 %
5-40 SYRINGE (ML) INJECTION EVERY 8 HOURS
Status: DISCONTINUED | OUTPATIENT
Start: 2020-02-05 | End: 2020-02-08 | Stop reason: HOSPADM

## 2020-02-05 RX ORDER — MIDAZOLAM HYDROCHLORIDE 1 MG/ML
INJECTION, SOLUTION INTRAMUSCULAR; INTRAVENOUS AS NEEDED
Status: DISCONTINUED | OUTPATIENT
Start: 2020-02-05 | End: 2020-02-05 | Stop reason: HOSPADM

## 2020-02-05 RX ORDER — POLYETHYLENE GLYCOL 3350 17 G/17G
17 POWDER, FOR SOLUTION ORAL DAILY
Status: DISCONTINUED | OUTPATIENT
Start: 2020-02-06 | End: 2020-02-08 | Stop reason: HOSPADM

## 2020-02-05 RX ADMIN — MIDAZOLAM 1 MG: 1 INJECTION INTRAMUSCULAR; INTRAVENOUS at 09:03

## 2020-02-05 RX ADMIN — TRANEXAMIC ACID 1 G: 100 INJECTION, SOLUTION INTRAVENOUS at 09:50

## 2020-02-05 RX ADMIN — FENTANYL CITRATE 25 MCG: 50 INJECTION, SOLUTION INTRAMUSCULAR; INTRAVENOUS at 10:21

## 2020-02-05 RX ADMIN — CELECOXIB 200 MG: 200 CAPSULE ORAL at 20:01

## 2020-02-05 RX ADMIN — BUPIVACAINE HYDROCHLORIDE 10 MG: 5 INJECTION, SOLUTION EPIDURAL; INTRACAUDAL; PERINEURAL at 09:14

## 2020-02-05 RX ADMIN — PROPOFOL 30 MG: 10 INJECTION, EMULSION INTRAVENOUS at 10:21

## 2020-02-05 RX ADMIN — Medication 2 G: at 09:44

## 2020-02-05 RX ADMIN — DEXAMETHASONE SODIUM PHOSPHATE 4 MG: 4 INJECTION, SOLUTION INTRAMUSCULAR; INTRAVENOUS at 09:26

## 2020-02-05 RX ADMIN — FENTANYL CITRATE 25 MCG: 50 INJECTION, SOLUTION INTRAMUSCULAR; INTRAVENOUS at 09:32

## 2020-02-05 RX ADMIN — CELECOXIB 200 MG: 200 CAPSULE ORAL at 15:20

## 2020-02-05 RX ADMIN — PROPOFOL 20 MG: 10 INJECTION, EMULSION INTRAVENOUS at 09:30

## 2020-02-05 RX ADMIN — SENNOSIDES AND DOCUSATE SODIUM 1 TABLET: 8.6; 5 TABLET ORAL at 17:32

## 2020-02-05 RX ADMIN — Medication 10 MG: at 09:17

## 2020-02-05 RX ADMIN — Medication 10 MG: at 11:55

## 2020-02-05 RX ADMIN — PROPOFOL 10 MG: 10 INJECTION, EMULSION INTRAVENOUS at 09:20

## 2020-02-05 RX ADMIN — DEXMEDETOMIDINE HYDROCHLORIDE 4 MCG: 100 INJECTION, SOLUTION, CONCENTRATE INTRAVENOUS at 09:03

## 2020-02-05 RX ADMIN — DEXMEDETOMIDINE HYDROCHLORIDE 8 MCG: 100 INJECTION, SOLUTION, CONCENTRATE INTRAVENOUS at 10:22

## 2020-02-05 RX ADMIN — FENTANYL CITRATE 25 MCG: 50 INJECTION, SOLUTION INTRAMUSCULAR; INTRAVENOUS at 09:52

## 2020-02-05 RX ADMIN — ACETAMINOPHEN 650 MG: 325 TABLET ORAL at 08:43

## 2020-02-05 RX ADMIN — ACETAMINOPHEN 650 MG: 325 TABLET ORAL at 20:01

## 2020-02-05 RX ADMIN — SODIUM CHLORIDE, SODIUM LACTATE, POTASSIUM CHLORIDE, AND CALCIUM CHLORIDE 1000 ML: 600; 310; 30; 20 INJECTION, SOLUTION INTRAVENOUS at 08:45

## 2020-02-05 RX ADMIN — SODIUM CHLORIDE, POTASSIUM CHLORIDE, SODIUM LACTATE AND CALCIUM CHLORIDE: 600; 310; 30; 20 INJECTION, SOLUTION INTRAVENOUS at 09:54

## 2020-02-05 RX ADMIN — CELECOXIB 200 MG: 200 CAPSULE ORAL at 08:43

## 2020-02-05 RX ADMIN — SODIUM CHLORIDE 125 ML/HR: 900 INJECTION, SOLUTION INTRAVENOUS at 20:10

## 2020-02-05 RX ADMIN — PROPOFOL 50 MCG/KG/MIN: 10 INJECTION, EMULSION INTRAVENOUS at 09:10

## 2020-02-05 RX ADMIN — RIVAROXABAN 10 MG: 10 TABLET, FILM COATED ORAL at 20:01

## 2020-02-05 RX ADMIN — FENTANYL CITRATE 25 MCG: 50 INJECTION, SOLUTION INTRAMUSCULAR; INTRAVENOUS at 09:09

## 2020-02-05 RX ADMIN — SODIUM CHLORIDE 125 ML/HR: 900 INJECTION, SOLUTION INTRAVENOUS at 12:00

## 2020-02-05 RX ADMIN — ACETAMINOPHEN 650 MG: 325 TABLET ORAL at 15:20

## 2020-02-05 RX ADMIN — LIDOCAINE HYDROCHLORIDE 40 MG: 20 INJECTION, SOLUTION EPIDURAL; INFILTRATION; INTRACAUDAL; PERINEURAL at 09:10

## 2020-02-05 RX ADMIN — SODIUM CHLORIDE, POTASSIUM CHLORIDE, SODIUM LACTATE AND CALCIUM CHLORIDE: 600; 310; 30; 20 INJECTION, SOLUTION INTRAVENOUS at 08:00

## 2020-02-05 RX ADMIN — PROPOFOL 20 MG: 10 INJECTION, EMULSION INTRAVENOUS at 09:23

## 2020-02-05 RX ADMIN — Medication 5 MG: at 10:40

## 2020-02-05 RX ADMIN — PHENYLEPHRINE HYDROCHLORIDE 40 MCG/MIN: 10 INJECTION INTRAVENOUS at 09:09

## 2020-02-05 RX ADMIN — Medication 2 G: at 17:32

## 2020-02-05 NOTE — PROGRESS NOTES
Primary Nurse Amie Meza and Mely Oh RN performed a dual skin assessment on this patient. No impairment noted with exception to surgical site/dressing to R hip. Aneesh score is 21.

## 2020-02-05 NOTE — BRIEF OP NOTE
BRIEF OPERATIVE NOTE    Date of Procedure: 2/5/2020   Preoperative Diagnosis: DJD RIGHT HIP  Postoperative Diagnosis: DEGENERATIVE JOINT DISEASE RIGHT HIP    Procedure(s):  RIGHT TOTAL HIP ARTHROPLASTY  Surgeon(s) and Role:     Bryant Jane MD - Primary         Surgical Assistant: Jeni Castillo PA-C    Surgical Staff:  Circ-1: Aminata Gillis RN  Physician Assistant: Any Poon PA-C  Radiology Technician: Chris Aguirre  Scrub Tech-1: Manju Vallecillo  Surg Asst-1: Hannah Vicente  Float Staff: Mark Anthony Quiros RN  Event Time In Time Out   Incision Start 6774    Incision Close 1134      Anesthesia: Spinal   Estimated Blood Loss: 200 mL  Specimens: * No specimens in log *   Findings: DJD Right hip   Complications: None. Implants:   Implant Name Type Inv.  Item Serial No.  Lot No. LRB No. Used Action   CUP ACET SECTOR GRIPTION 52MM -- TI - SNA  CUP ACET SECTOR GRIPTION 52MM -- TI NA [facility] [id number] Right 1 Implanted   SCR ACET CANC PINN 6.5X15MM SS --  - SNA  SCR ACET CANC PINN 6.5X15MM SS --  NA [facility] [id number] Right 1 Implanted   LINER ACET PINN NEUT [id number] -- ALTRX - SNA  LINER ACET PINN NEUT [id number] -- ALTRX NA [facility] [id number] Right 1 Implanted   STEM FEMORAL SUMMIT - SNA  STEM FEMORAL SUMMIT NA [facility] [id number] Right 1 Implanted   HEAD FEM 36MM 2MM NK --  - SNA  HEAD FEM 36MM 2MM NK --  NA [facility] [id number] Right 1 Implanted

## 2020-02-05 NOTE — PERIOP NOTES
TRANSFER - OUT REPORT:    Verbal [facility]  being transferred to [place] [address](unit) for routine post - op       Report consisted of patients Situation, Background, Assessment and   Recommendations(SBAR). Time Pre op antibiotic OXNHE:9544  Anesthesia Stop time: 7507  Germain Present on Transfer to floor:no  Order for Germain on Chart:no  Discharge Prescriptions with Chart:no    Information from the following report(s) SBAR, Kardex, OR Summary, MAR, Med Rec Status and Cardiac Rhythm SR was reviewed with the receiving nurse. Opportunity for questions and clarification was provided. Is the patient on 02? YES       L/Min 2L      Is the patient on a monitor? NO    Is the nurse transporting with the patient? NO    Surgical Waiting Area notified of patient's transfer from PACU? YES      The following personal items collected during your admission accompanied patient upon transfer:   Dental Appliance: Dental Appliances: Uppers, With patient(full upper dentures returned to patient)  Vision:    Hearing Aid:    Jewelry:    Clothing: Clothing: (bag of clothes returned to pt)  Other Valuables:  Other Valuables: Cane, Eyeglasses(returned to patient)  Valuables sent to safe:

## 2020-02-05 NOTE — PERIOP NOTES
Patient's sister updated by ARABELLA Holden RN--informed of surgery start time and patient's well-being. Called at [address] [place] [address].

## 2020-02-05 NOTE — ANESTHESIA PREPROCEDURE EVALUATION
Relevant Problems   No relevant active problems       Anesthetic History   No history of anesthetic complications            Review of Systems / Medical History  Patient summary reviewed, nursing notes reviewed and pertinent labs reviewed    Pulmonary    COPD      Smoker         Neuro/Psych   Within defined limits           Cardiovascular    Hypertension                   GI/Hepatic/Renal  Within defined limits              Endo/Other    Diabetes  Hypothyroidism  Arthritis and anemia     Other Findings            Physical Exam    Airway  Mallampati: II  TM Distance: > 6 cm  Neck ROM: normal range of motion   Mouth opening: Normal     Cardiovascular  Regular rate and rhythm,  S1 and S2 normal,  no murmur, click, rub, or gallop             Dental  No notable dental hx       Pulmonary  Breath sounds clear to auscultation               Abdominal  GI exam deferred       Other Findings            Anesthetic Plan    ASA: 3  Anesthesia type: spinal            Anesthetic plan and risks discussed with: Patient

## 2020-02-05 NOTE — PROGRESS NOTES
Problem: Falls - Risk of  Goal: *Absence of Falls  Description  Document Charlotte Escoto Fall Risk and appropriate interventions in the flowsheet. Outcome: Progressing Towards Goal  Note: Fall Risk Interventions:  Mobility Interventions: Communicate number of staff needed for ambulation/transfer, Patient to call before getting OOB    Medication Interventions: Evaluate medications/consider consulting pharmacy, Patient to call before getting OOB, Teach patient to arise slowly    Elimination Interventions: Call light in reach, Patient to call for help with toileting needs, Toileting schedule/hourly rounds      Problem: Hip Replacement: Day of Surgery/Unit  Goal: Activity/Safety  Outcome: Progressing Towards Goal  Patient ambulated safely with PT utilizing gait belt and walker. Patient tolerated well per PT. Problem: Pain  Goal: *Control of Pain  Outcome: Progressing Towards Goal  RN assessing patient's pain levels every 4 hours and reassessing within an hour of intervention.

## 2020-02-05 NOTE — PROGRESS NOTES
Problem: Falls - Risk of  Goal: *Absence of Falls  Description  Document Colette Norman Fall Risk and appropriate interventions in the flowsheet.   Outcome: Progressing Towards Goal  Note: Fall Risk Interventions:  Mobility Interventions: PT Consult for mobility concerns         Medication Interventions: Patient to call before getting OOB    Elimination Interventions: Call light in reach

## 2020-02-05 NOTE — ANESTHESIA PROCEDURE NOTES
Spinal Block    Start time: 2/5/2020 9:08 AM  End time: 2/5/2020 9:14 AM  Performed by: Latrell Tang CRNA  Authorized by: Mili Ko MD     Pre-procedure:   Indications: at surgeon's request and primary anesthetic  Preanesthetic Checklist: patient identified, risks and benefits discussed, anesthesia consent, site marked, patient being monitored and timeout performed    Timeout Time: 09:08          Spinal Block:   Patient Position:  Seated  Prep Region:  Lumbar  Prep: Betadine      Location:  L3-4  Technique:  Single shot    Local Dose (mL):  10    Needle:   Needle Type:  Pencan  Needle Gauge:  25 G  Attempts:  1      Events: CSF confirmed, no blood with aspiration and no paresthesia        Assessment:  Insertion:  Uncomplicated  Patient tolerance:  Patient tolerated the procedure well with no immediate complications

## 2020-02-05 NOTE — PROGRESS NOTES
Problem: Mobility Impaired (Adult and Pediatric)  Goal: *Acute Goals and Plan of Care (Insert Text)  Description  FUNCTIONAL STATUS PRIOR TO ADMISSION: Patient was independent and active without use of DME.    HOME SUPPORT PRIOR TO ADMISSION: The patient lived alone with family to provide assistance if needed. Physical Therapy Goals  Initiated 2/5/2020    1. Patient will move from supine to sit and sit to supine , scoot up and down and roll side to side in bed with modified independence within 4 days. 2. Patient will perform sit to stand with modified independence within 4 days. 3. Patient will ambulate with modified independence for 150 feet with the least restrictive device within 4 days. 4. Patient will ascend/descend 4 stairs with cane and one handrail with modified independence within 4 days. 5. Patient will perform post-OLIVE home exercise program per protocol with independence within 4 days. Outcome: Progressing Towards Goal   PHYSICAL THERAPY EVALUATION  Patient: Bryan Simmonds (38 y.o. female)  Date: 2/5/2020  Primary Diagnosis: Osteoarthritis of right hip [M16.11]  Procedure(s) (LRB):  RIGHT TOTAL HIP ARTHROPLASTY (Right) Day of Surgery   Precautions:   Fall, PWB(50%)      ASSESSMENT  Based on the objective data described below, the patient presents with  impairment in functional mobility, activity tolerance and balance s/p R OLIVE. , posterior approach. Had L OLIVE 2 years ago. PLOF: Independent with ADLs and IADLs. Lives alone in a 2 story home, but stays on first floor (master bed & bath downstairs). 2 steps to enter the home, no rails. Family is available to assist her as needed and will take turns staying with her upon discharge. Patient's mobility was on target for POD#0. Will address more exercises, increase gait distance, negotiate stairs and assess for discharge at BID PT sessions tomorrow . Patient instructed NOT to get up from bed, chair or commode without calling for assistance.  She will benefit from [address] PT in order to achieve maximum level of safe, functional mobility, balance and return to independent PLOF. Current Level of Function Impacting Discharge (mobility/balance): Contact guard assistance for all mobility. Ambulated 12 ft with RW and contact guard. Patient attended pre-op JOINT CLASS, is independent with post-op OLIVE exercise protocol and has same in written, illlustrated form. Functional Outcome Measure: The patient scored 50/100 on the Barthel outcome measure which is indicative of moderate dependence on caregivers for assistance with mobility and ADLs. Other factors to consider for discharge: Motivated/A & O x 4/Supportive Family/Independent PLOF      Patient will benefit from skilled therapy intervention to address the above noted impairments. PLAN :  Recommendations and Planned Interventions: bed mobility training, transfer training, gait training, therapeutic exercises, patient and family training/education, and therapeutic activities      Frequency/Duration: Patient will be followed by physical therapy:  twice daily to address goals. Recommendation for discharge: (in order for the patient to meet his/her long term goals)  Physical therapy at least 2 days/week in the home     This discharge recommendation:  Has been made in collaboration with the attending provider and/or case management    IF patient discharges home will need the following DME: patient owns DME required for discharge         SUBJECTIVE:   Patient stated I want to try to get up and do as much as I can.     OBJECTIVE DATA SUMMARY:   HISTORY:    Past Medical History:   Diagnosis Date    Arthritis     Chronically on opiate therapy     Cigarette smoker     ct 6/25/13    COPD (chronic obstructive pulmonary disease) (HCC)     Elevated cholesterol     Epicondylitis elbow, medial, right     High cholesterol     Hip pain, chronic, right hip pain    Hypertension     Hypothyroid     IGT (impaired glucose tolerance) 11/13/2014    Onychomycosis     S/P colonoscopy 12-12-13    cecal lipoma    S/P colonoscopy 12/12/2013    hemorrhoids,diverticulosis    Tubulovillous adenoma of colon 2008     Past Surgical History:   Procedure Laterality Date    HX BREAST BIOPSY Left 1980    BENIGN    HX BUNIONECTOMY Left 1978    HX BUNIONECTOMY Right 2011    HX COLONOSCOPY      HX DILATION AND CURETTAGE  1980`S    HX GI      COLONOSCOPY    HX HEENT  2017    ORAL SURGERY TO REMOVE UPPER  TEETH     HX ORTHOPAEDIC Left 1980`S     BUNIONECTOMY     HX ORTHOPAEDIC Right 2015    BUNIONECTOMY    HX POLYPECTOMY      TOTAL HIP ARTHROPLASTY Left 2018       Personal factors and/or comorbidities impacting plan of care: Motivated/A & O x 4/Supportive Family/Independent PLOF     Home Situation  Home Environment: Private residence  # Steps to Enter: 2  Rails to Enter: No  Wheelchair Ramp: No  One/Two Story Residence: Two story, live on 1st floor  # of Interior Steps: 14  Interior Rails: Both  Lift Chair Available: No  Living Alone: Yes  Support Systems: Family member(s)  Patient Expects to be Discharged to[de-identified] Private residence  Current DME Used/Available at Home: Cane, straight, Raised toilet seat, Shower chair, Walker, rolling, Wheelchair  Tub or Shower Type: Tub/Shower combination    EXAMINATION/PRESENTATION/DECISION MAKING:   Critical Behavior:  Neurologic State: Alert           Hearing:   Auditory  Auditory Impairment: None    Range Of Motion:  AROM: Generally decreased, functional           PROM: Generally decreased, functional           Strength:    Strength: Generally decreased, functional                    Tone & Sensation:   Tone: Normal              Sensation: Intact               Coordination:  Coordination: Within functional limits  Vision:      Functional Mobility:  Bed Mobility:     Supine to Sit: Contact guard assistance  Sit to Supine: Contact guard assistance  Scooting: Contact guard assistance  Transfers:  Sit to Stand: Contact guard assistance  Stand to Sit: Contact guard assistance                       Balance:   Sitting: Intact  Standing: Impaired; Without support  Standing - Static: Good;Constant support  Standing - Dynamic : Good;Constant support  Ambulation/Gait Training:  Distance (ft): 12 Feet (ft)  Assistive Device: Walker, rolling;Gait belt  Ambulation - Level of Assistance: Contact guard assistance        Gait Abnormalities: Antalgic;Decreased step clearance; Step to gait  Right Side Weight Bearing: As tolerated     Base of Support: Narrowed  Stance: Right decreased  Speed/Love: Slow  Step Length: Left shortened;Right shortened  Swing Pattern: Right asymmetrical                Therapeutic Exercises: Ankle Pumps  Quad sets (5 second hold)  Glute sets (5 second hold)  X 10 reps every hour   Heel slides x 10     Functional Measure:  Barthel Index:    Bathin  Bladder: 10  Bowels: 10  Groomin  Dressin  Feeding: 10  Mobility: 0  Stairs: 0  Toilet Use: 5  Transfer (Bed to Chair and Back): 10  Total: 50/100       The Barthel ADL Index: Guidelines  1. The index should be used as a record of what a patient does, not as a record of what a patient could do. 2. The main aim is to establish degree of independence from any help, physical or verbal, however minor and for whatever reason. 3. The need for supervision renders the patient not independent. 4. A patient's performance should be established using the best available evidence. Asking the patient, friends/relatives and nurses are the usual sources, but direct observation and common sense are also important. However direct testing is not needed. 5. Usually the patient's performance over the preceding 24-48 hours is important, but occasionally longer periods will be relevant. 6. Middle categories imply that the patient supplies over 50 per cent of the effort. 7. Use of aids to be independent is allowed. Mustapha Driscoll., Barthel, D.W. (2258).  Functional evaluation: the Barthel Index. [facility] (14)2. Xochitl SAMY Amaya, Lashell Cox., [facility]., [place], [address] (1999). Measuring the change indisability after inpatient rehabilitation; comparison of the responsiveness of the Barthel Index and Functional [place] Measure. Journal of Neurology, Neurosurgery, and Psychiatry, 66(4), 614-887. MELANIE Galeana, LY Saldivar, & Shelby Lazcano M.A. (2004.) Assessment of post-stroke quality of life in cost-effectiveness studies: The usefulness of the Barthel Index and the EuroQoL-5D. Quality of Life Research, 15, 344-37           Physical Therapy Evaluation Charge Determination   History Examination Presentation Decision-Making   LOW Complexity : Zero comorbidities / personal factors that will impact the outcome / POC LOW Complexity : 1-2 Standardized tests and measures addressing body structure, function, activity limitation and / or participation in recreation  LOW Complexity : Stable, uncomplicated  LOW Complexity : FOTO score of       Based on the above components, the patient evaluation is determined to be of the following complexity level: LOW     Pain Rating:  3/10    Activity Tolerance:   Fair  Please refer to the flowsheet for vital signs taken during this treatment. Vitals:    02/05/20 1333 02/05/20 1425 02/05/20 1430 02/05/20 1448   BP: 109/62 103/63 101/63 104/60   BP 1 Location:  Right arm Right arm Right arm   BP Patient Position:  At rest;Head of bed elevated (Comment degrees)  Comment: 30 degrees Sitting At rest   Pulse:  (!) 51 73 72   Resp:       Temp:    97.4 °F (36.3 °C)   SpO2:  100%     Weight:            After treatment patient left in no apparent distress:   Supine in bed, Call bell within reach, Side rails x 3, and nurse notified. COMMUNICATION/EDUCATION:   The patients plan of care was discussed with: Registered Nurse.     Fall prevention education was provided and the patient/caregiver indicated understanding., Patient/family have participated as able in goal setting and plan of care. , and Patient/family agree to work toward stated goals and plan of care.     Thank you for this referral.  Leydi Foreman   Time Calculation: 30 mins

## 2020-02-05 NOTE — PROGRESS NOTES
Bedside shift change report given to [employer], RN (oncoming nurse) by Lex Payment, RN (offgoing nurse). Report included the following information SBAR, Kardex, OR Summary, Intake/Output, MAR and Recent Results.

## 2020-02-05 NOTE — PROGRESS NOTES
TRANSFER - IN REPORT:    Verbal report received from [place] [id number] [facility] (name) on Oni Corley  being received from PACU (unit) for routine post - op      Report consisted of patients Situation, Background, Assessment and   Recommendations(SBAR). Information from the following report(s) SBAR, Kardex, OR Summary, Intake/Output, MAR and Recent Results was reviewed with the receiving nurse. Opportunity for questions and clarification was provided. Assessment completed upon patients arrival to unit and care assumed.

## 2020-02-05 NOTE — ANESTHESIA POSTPROCEDURE EVALUATION
Post-Anesthesia Evaluation and Assessment    Patient: Kalpana Burrell MRN: [id number]  SSN: [id number]    YOB: 1940  Age: [de-identified] y.o. Sex: female      I have evaluated the patient and they are stable and ready for discharge from the PACU. Cardiovascular Function/Vital Signs  Visit Vitals  /55   Pulse (!) 56   Temp 36.4 °C (97.6 °F)   Resp 12   Wt 62.6 kg (138 lb)   SpO2 98%   BMI 26.07 kg/m²       Patient is status post Spinal anesthesia for Procedure(s):  RIGHT TOTAL HIP ARTHROPLASTY. Nausea/Vomiting: None    Postoperative hydration reviewed and adequate. Pain:  Pain Scale 1: Numeric (0 - 10) (02/05/20 1220)  Pain Intensity 1: 0 (02/05/20 1220)   Managed    Neurological Status:   Neuro (WDL): Exceptions to WDL (02/05/20 1220)  Neuro  Neurologic State: Appropriate for age (02/05/20 1220)  LUE Motor Response: Purposeful (02/05/20 1220)  LLE Motor Response: Pharmacologically paralyzed;Numbness (02/05/20 1220)  RUE Motor Response: Purposeful (02/05/20 1220)  RLE Motor Response: Pharmacologically paralyzed;Numbness (02/05/20 1220)   At baseline    Mental Status, Level of Consciousness: Alert and  oriented to person, place, and time    Pulmonary Status:   O2 Device: CO2 nasal cannula (02/05/20 1220)   Adequate oxygenation and airway patent    Complications related to anesthesia: None    Post-anesthesia assessment completed.  No concerns    Signed By: Tali Felton MD     February 5, 2020

## 2020-02-06 LAB
ANION GAP SERPL CALC-SCNC: 7 MMOL/L (ref 5–15)
BUN SERPL-MCNC: 20 MG/DL (ref 6–20)
BUN/CREAT SERPL: 21 (ref 12–20)
CALCIUM SERPL-MCNC: 8.3 MG/DL (ref 8.5–10.1)
CHLORIDE SERPL-SCNC: 112 MMOL/L (ref 97–108)
CO2 SERPL-SCNC: 21 MMOL/L (ref 21–32)
CREAT SERPL-MCNC: 0.95 MG/DL (ref 0.55–1.02)
GLUCOSE SERPL-MCNC: 127 MG/DL (ref 65–100)
HGB BLD-MCNC: 8.4 G/DL (ref 11.5–16)
INR PPP: 1.2 (ref 0.9–1.1)
POTASSIUM SERPL-SCNC: 4.1 MMOL/L (ref 3.5–5.1)
PROTHROMBIN TIME: 11.9 SEC (ref 9–11.1)
SODIUM SERPL-SCNC: 140 MMOL/L (ref 136–145)

## 2020-02-06 PROCEDURE — 74011250637 HC RX REV CODE- 250/637: Performed by: PHYSICIAN ASSISTANT

## 2020-02-06 PROCEDURE — 97165 OT EVAL LOW COMPLEX 30 MIN: CPT

## 2020-02-06 PROCEDURE — 36415 COLL VENOUS BLD VENIPUNCTURE: CPT

## 2020-02-06 PROCEDURE — 85610 PROTHROMBIN TIME: CPT

## 2020-02-06 PROCEDURE — 97116 GAIT TRAINING THERAPY: CPT

## 2020-02-06 PROCEDURE — 74011250636 HC RX REV CODE- 250/636: Performed by: PHYSICIAN ASSISTANT

## 2020-02-06 PROCEDURE — 97530 THERAPEUTIC ACTIVITIES: CPT

## 2020-02-06 PROCEDURE — 85018 HEMOGLOBIN: CPT

## 2020-02-06 PROCEDURE — 77030012893

## 2020-02-06 PROCEDURE — 65270000029 HC RM PRIVATE

## 2020-02-06 PROCEDURE — 80048 BASIC METABOLIC PNL TOTAL CA: CPT

## 2020-02-06 PROCEDURE — 97535 SELF CARE MNGMENT TRAINING: CPT

## 2020-02-06 RX ORDER — OXYCODONE HYDROCHLORIDE 5 MG/1
5 TABLET ORAL
Qty: 40 TAB | Refills: 0 | Status: SHIPPED | OUTPATIENT
Start: 2020-02-06 | End: 2020-02-13

## 2020-02-06 RX ADMIN — Medication 10 ML: at 02:16

## 2020-02-06 RX ADMIN — ACETAMINOPHEN 650 MG: 325 TABLET ORAL at 15:43

## 2020-02-06 RX ADMIN — LEVOTHYROXINE SODIUM 25 MCG: 0.05 TABLET ORAL at 07:26

## 2020-02-06 RX ADMIN — SIMVASTATIN 40 MG: 40 TABLET, FILM COATED ORAL at 21:43

## 2020-02-06 RX ADMIN — CELECOXIB 200 MG: 200 CAPSULE ORAL at 21:43

## 2020-02-06 RX ADMIN — Medication 2 G: at 02:16

## 2020-02-06 RX ADMIN — SODIUM CHLORIDE 125 ML/HR: 900 INJECTION, SOLUTION INTRAVENOUS at 02:16

## 2020-02-06 RX ADMIN — CELECOXIB 200 MG: 200 CAPSULE ORAL at 09:29

## 2020-02-06 RX ADMIN — SENNOSIDES AND DOCUSATE SODIUM 1 TABLET: 8.6; 5 TABLET ORAL at 09:29

## 2020-02-06 RX ADMIN — ACETAMINOPHEN 650 MG: 325 TABLET ORAL at 02:16

## 2020-02-06 RX ADMIN — POLYETHYLENE GLYCOL 3350 17 G: 17 POWDER, FOR SOLUTION ORAL at 09:30

## 2020-02-06 RX ADMIN — Medication 10 ML: at 21:44

## 2020-02-06 RX ADMIN — ACETAMINOPHEN 650 MG: 325 TABLET ORAL at 21:43

## 2020-02-06 RX ADMIN — ACETAMINOPHEN 650 MG: 325 TABLET ORAL at 09:29

## 2020-02-06 RX ADMIN — RIVAROXABAN 10 MG: 10 TABLET, FILM COATED ORAL at 12:44

## 2020-02-06 RX ADMIN — SENNOSIDES AND DOCUSATE SODIUM 1 TABLET: 8.6; 5 TABLET ORAL at 19:18

## 2020-02-06 NOTE — PROGRESS NOTES
Problem: Self Care Deficits Care Plan (Adult)  Goal: *Acute Goals and Plan of Care (Insert Text)  Description  Occupational Therapy Goals  Initiated: 2/6/2020   1. Patient will perform grooming with supervision/set-up standing at sink within 3 day(s). 2.  Patient will perform bathing with supervision/set-up from chair within 3 day(s). 3.  Patient will perform upper body dressing and lower body dressing with supervision/set-up within 3 day(s). 4.  Patient will perform toilet transfers with supervision/set-up within 3 day(s). 5.  Patient will perform all aspects of toileting with supervision/set-up within 3 day(s). 6.  Patient will be independent with hip precautions within 3 days. FUNCTIONAL STATUS PRIOR TO ADMISSION: reports she uses a walker and wheelchair at home. She reports difficulty with moblity since other hip replacement in 2018. [facility] HOME SUPPORT: unclear of support level for home. Outcome: Progressing Towards Goal     OCCUPATIONAL THERAPY EVALUATION  Patient: Jessica Hayes (80 y.o. female)  Date: 2/6/2020  Primary Diagnosis: Osteoarthritis of right hip [M16.11]  Procedure(s) (LRB):  RIGHT TOTAL HIP ARTHROPLASTY (Right) 1 Day Post-Op   Precautions:   Fall, PWB    ASSESSMENT  Based on the objective data described below, the patient presents with decreased independence with all self care and functional mobility following admission for THR. Pt progressed OOB to bathroom with additional time and cues. Pt stood but noted with difficulty placing RLE on the floor and with weight bearing. Pt transferred to [facility] only and was unable to ambulate to the bathroom due to pain with activity. Pt returned to chair following intervention and remained sitting upright in chair. Pt plans to discharge to SNF setting as she did with other surgery in the past.  At this time, in agreement with discharge to rehab setting as she is demonstrating poor pain tolerance with significant impairment with ADL activities. Current Level of Function Impacting Discharge (ADLs/self-care): maximal impairment with ADL activities    Functional Outcome Measure: The patient scored 45 on the Barthel Index outcome measure which is indicative of moderate impairment with ADL activities. Other factors to consider for discharge: pain tolerate, PLOF     Patient will benefit from skilled therapy intervention to address the above noted impairments. PLAN :  Recommendations and Planned Interventions: self care training, functional mobility training, therapeutic exercise, balance training, visual/perceptual training, therapeutic activities, endurance activities, patient education, home safety training, and family training/education    Frequency/Duration: Patient will be followed by occupational therapy 5 times a week to address goals. Recommendation for discharge: (in order for the patient to meet his/her long term goals)  Therapy up to 5 days/week in SNF setting or an intensive home health therapy program    This discharge recommendation:  Has been made in collaboration with the attending provider and/or case management    IF patient discharges home will need the following DME: TBD       SUBJECTIVE:   Patient stated I am feeling alright.     OBJECTIVE DATA SUMMARY:   HISTORY:   Past Medical History:   Diagnosis Date    Arthritis     Chronically on opiate therapy     Cigarette smoker     ct 6/25/13    COPD (chronic obstructive pulmonary disease) (HCC)     Elevated cholesterol     Epicondylitis elbow, medial, right     High cholesterol     Hip pain, chronic, right hip pain    Hypertension     Hypothyroid     IGT (impaired glucose tolerance) 11/13/2014    Onychomycosis     S/P colonoscopy 12-12-13    cecal lipoma    S/P colonoscopy 12/12/2013    hemorrhoids,diverticulosis    Tubulovillous adenoma of colon 2008     Past Surgical History:   Procedure Laterality Date    HX BREAST BIOPSY Left 1980    BENIGN    HX BUNIONECTOMY Left 1978 HX BUNIONECTOMY Right 2011    HX COLONOSCOPY      HX DILATION AND CURETTAGE  1980`S    HX GI      COLONOSCOPY    HX HEENT  2017    ORAL SURGERY TO REMOVE UPPER  TEETH     HX ORTHOPAEDIC Left 1980`S     BUNIONECTOMY     HX ORTHOPAEDIC Right 2015    BUNIONECTOMY    HX POLYPECTOMY      TOTAL HIP ARTHROPLASTY Left 2018       Expanded or extensive additional review of patient history:     Home Situation  Home Environment: Private residence  # Steps to Enter: 2  Rails to Enter: No  Wheelchair Ramp: No  One/Two Story Residence: Two story, live on 1st floor  # of Interior Steps: 14  Interior Rails: Both  Lift Chair Available: No  Living Alone: Yes  Support Systems: Family member(s)  Patient Expects to be Discharged to[de-identified] Private residence  Current DME Used/Available at Home: Cane, straight, Raised toilet seat, Shower chair, Walker, Wheelchair  Tub or Shower Type: Tub/Shower combination    Hand dominance: Right    EXAMINATION OF PERFORMANCE DEFICITS:  Cognitive/Behavioral Status:  Neurologic State: Alert  Orientation Level: Oriented X4  Cognition: Appropriate for age attention/concentration  Perception: Appears intact  Perseveration: No perseveration noted  Safety/Judgement: Good awareness of safety precautions    Skin: see nursing notes    Edema: none noted    Hearing: Auditory  Auditory Impairment: None    Vision/Perceptual:                           Acuity: Within Defined Limits         Range of Motion:    AROM: Within functional limits  PROM: Within functional limits                      Strength:    Strength: Within functional limits                Coordination:  Coordination: Within functional limits  Fine Motor Skills-Upper: Right Intact; Left Intact    Gross Motor Skills-Upper: Right Intact; Left Intact    Tone & Sensation:    Tone: Normal  Sensation: Intact                      Balance:  Sitting: Intact  Standing: Impaired; With support  Standing - Static: Fair  Standing - Dynamic : Poor    Functional Mobility and Transfers for ADLs:  Bed Mobility:  Supine to Sit: Additional time;Minimum assistance  Sit to Supine: (remained in chair)    Transfers:  Sit to Stand: Minimum assistance  Stand to Sit: Minimum assistance  Bed to Chair: Minimum assistance  Bathroom Mobility: Minimum assistance(to [facility] only)  Toilet Transfer : Minimum assistance    ADL Assessment:  Feeding: Supervision    Oral Facial Hygiene/Grooming: Supervision    Bathing: Maximum assistance    Upper Body Dressing: Supervision    Lower Body Dressing: Maximum assistance    Toileting: Maximum assistance(unable to safely stand for hygiene)                ADL Intervention and task modifications:     Pt completed OOB to [facility] with cues and education for technique and for safety. Pt with poor walker management skills and provided with training and education for safety with functional transfers. Pt returned to chair following. Cognitive Retraining  Safety/Judgement: Good awareness of safety precautions    Functional Measure:  Barthel Index:    Bathin  Bladder: 10  Bowels: 10  Groomin  Dressin  Feeding: 10  Mobility: 0  Stairs: 0  Toilet Use: 5  Transfer (Bed to Chair and Back): 5  Total: 45/100        The Barthel ADL Index: Guidelines  1. The index should be used as a record of what a patient does, not as a record of what a patient could do. 2. The main aim is to establish degree of independence from any help, physical or verbal, however minor and for whatever reason. 3. The need for supervision renders the patient not independent. 4. A patient's performance should be established using the best available evidence. Asking the patient, friends/relatives and nurses are the usual sources, but direct observation and common sense are also important. However direct testing is not needed. 5. Usually the patient's performance over the preceding 24-48 hours is important, but occasionally longer periods will be relevant.   6. Middle categories imply that the patient supplies over 50 per cent of the effort. 7. Use of aids to be independent is allowed. Juan Ramon Mcguire., Barthel, D.W. (2983). Functional evaluation: the Barthel Index. [address] (14)2. Sandria Cogan [place], J.J.M.F, Beverley Model., Latricelj Arrington, [place], [address] (1999). Measuring the change indisability after inpatient rehabilitation; comparison of the responsiveness of the Barthel Index and Functional Clarke Measure. Journal of Neurology, Neurosurgery, and Psychiatry, 66(4), 108-474. MELANIE Palacios, LY Saldivar, & Ghazal Koch M.A. (2004.) Assessment of post-stroke quality of life in cost-effectiveness studies: The usefulness of the Barthel Index and the EuroQoL-5D. Quality of Life Research, 15, 702-32         Occupational Therapy Evaluation Charge Determination   History Examination Decision-Making   LOW Complexity : Brief history review  MEDIUM Complexity : 3-5 performance deficits relating to physical, cognitive , or psychosocial skils that result in activity limitations and / or participation restrictions LOW Complexity : No comorbidities that affect functional and no verbal or physical assistance needed to complete eval tasks       Based on the above components, the patient evaluation is determined to be of the following complexity level: LOW   Pain Rating: Moderate pain reported during all activities    Activity Tolerance:   Good  Please refer to the flowsheet for vital signs taken during this treatment. After treatment patient left in no apparent distress:    Sitting in chair and Call bell within reach    COMMUNICATION/EDUCATION:   The patients plan of care was discussed with: Physical Therapist and Registered Nurse. Home safety education was provided and the patient/caregiver indicated understanding. and Patient/family have participated as able in goal setting and plan of care.     This patients plan of care is appropriate for delegation to [facility].    Thank you for this referral.  Leeroy Linder, OT  Time Calculation: 18 mins

## 2020-02-06 NOTE — PROGRESS NOTES
Problem: Mobility Impaired (Adult and Pediatric)  Goal: *Acute Goals and Plan of Care (Insert Text)  Description  FUNCTIONAL STATUS PRIOR TO ADMISSION: Patient was independent and active without use of DME.    HOME SUPPORT PRIOR TO ADMISSION: The patient lived alone with family to provide assistance if needed. Physical Therapy Goals  Initiated 2/5/2020    1. Patient will move from supine to sit and sit to supine , scoot up and down and roll side to side in bed with modified independence within 4 days. 2. Patient will perform sit to stand with modified independence within 4 days. 3. Patient will ambulate with modified independence for 150 feet with the least restrictive device within 4 days. 4. Patient will ascend/descend 4 stairs with cane and one handrail with modified independence within 4 days. 5. Patient will perform post-OLIVE home exercise program per protocol with independence within 4 days. Outcome: Progressing Towards Goal  PHYSICAL THERAPY MORNING SESSION NOTE  Patient: Dante Toth (44 y.o. female)  Date: 2/6/2020  Primary Diagnosis: Osteoarthritis of right hip [M16.11]  Procedure(s) (LRB):  RIGHT TOTAL HIP ARTHROPLASTY (Right) 1 Day Post-Op   Precautions:  Fall, PWB    Dante Toth was seen for morning PT session. She is only walking 4 Ft with CGA and with RW. Gait is limited this morning d/t pain in which she reports is 8/10 on numerical scale. Currently, expect Dante Toth will need rehab prior to discharging to prior place of residence since she does live alone. Per OT Alondra, pt reports \"she uses walker and wheelchair at home and his been having difficulty w/ mobility since previous hip replacement (2018)\", which is different that PLOF mentioned above. Will plan to progress mobility as able and appropriate during this afternoon's session.     Morning session functional mobility:  Bed Mobility:     Supine to Sit: (received OOB in chair)  Sit to Supine: Stand-by assistance(into flat bed ) Transfers:  Sit to Stand: Minimum assistance  Stand to Sit: Stand-by assistance;Minimum assistance(pt quickly sitting,causing her to fall back)        Bed to Chair: Minimum assistance              Balance:   Sitting: Intact  Standing: Impaired; With support  Standing - Static: Constant support;Good  Standing - Dynamic : Constant support; Fair  Ambulation/Gait Training:  Distance (ft): 4 Feet (ft)(limited by pain (8/10))  Assistive Device: Gait belt;Walker, rolling  Ambulation - Level of Assistance: Contact guard assistance        Gait Abnormalities: Antalgic;Decreased step clearance; Step to gait        Base of Support: Narrowed  Stance: Right decreased  Speed/Love: Shuffled; Slow  Step Length: Left shortened;Right shortened  Swing Pattern: Right asymmetrical               Thank you,  Octavia Altamirano,PTA   Time Calculation: 19 mins

## 2020-02-06 NOTE — PROGRESS NOTES
Problem: Falls - Risk of  Goal: *Absence of Falls  Description  Document Jeremias Bill Fall Risk and appropriate interventions in the flowsheet.   Outcome: Progressing Towards Goal  Note: Fall Risk Interventions:  Mobility Interventions: PT Consult for mobility concerns         Medication Interventions: Patient to call before getting OOB    Elimination Interventions: Call light in reach              Problem: Hip Replacement: Post Op Day 1  Goal: *Hemodynamically stable  Outcome: Progressing Towards Goal     Problem: Hip Replacement: Post-Op Day 2  Goal: *Optimal pain control with oral analgesia  Outcome: Progressing Towards Goal

## 2020-02-06 NOTE — PROGRESS NOTES
TOTAL HIP PROGRESS NOTE      Subjective:     Post-Operative Day: 1 Status Post right Total Hip Arthroplasty  Systemic or Specific Complaints:No Complaints    Objective:     Patient Vitals for the past 24 hrs:   BP Temp Pulse Resp SpO2   02/06/20 0902 100/53 98.3 °F (36.8 °C) 82 15 96 %   02/06/20 0215 141/61 98.7 °F (37.1 °C) 84 15 96 %   02/05/20 2333 102/55 98.1 °F (36.7 °C) 60 16 98 %   02/05/20 1842 107/68 97.7 °F (36.5 °C) 63 16 98 %   02/05/20 1622 104/55 97.8 °F (36.6 °C) (!) 57 16 96 %   02/05/20 1533     98 %   02/05/20 1520 107/54 97.4 °F (36.3 °C) (!) 56     02/05/20 1448 104/60 97.4 °F (36.3 °C) 72     02/05/20 1430 101/63  73     02/05/20 1425 103/63  (!) 51  100 %   02/05/20 1333 109/62       02/05/20 1330 101/43 97.3 °F (36.3 °C) (!) 57 14 97 %   02/05/20 1310   (!) 56 13 100 %   02/05/20 1300 101/48  (!) 58 15 100 %   02/05/20 1245 106/53  60 12 100 %   02/05/20 1230 109/54  60 15 99 %   02/05/20 1225 102/55  60 16 98 %   02/05/20 1220 100/55 97.6 °F (36.4 °C) (!) 58 14 99 %   02/05/20 1215 94/63  (!) 56 15 99 %   02/05/20 1200 110/48  (!) 59 15 99 %   02/05/20 1155 (!) 83/44  64 16 99 %   02/05/20 1150 (!) 86/49 97.6 °F (36.4 °C) 75 14 96 %   02/05/20 1145 97/48  82 13 92 %   02/05/20 1144 90/49  64  96 %       General: alert, cooperative, no distress, appears stated age   Wound: Wound clean and dry no evidence of infection. , No Erythema, No Edema, No Drainage and Wound Intact   Motion: Painless Range of Motion   DVT Exam: No evidence of DVT seen on physical exam.  Negative Braydon's sign. No cords or calf tenderness. No significant calf/ankle edema.      Data Review:    Recent Results (from the past 24 hour(s))   METABOLIC PANEL, BASIC    Collection Time: 02/06/20  2:29 AM   Result Value Ref Range    Sodium 140 136 - 145 mmol/L    Potassium 4.1 3.5 - 5.1 mmol/L    Chloride 112 (H) 97 - 108 mmol/L    CO2 21 21 - 32 mmol/L    Anion gap 7 5 - 15 mmol/L    Glucose 127 (H) 65 - 100 mg/dL    BUN 20 6 - 20 MG/DL    Creatinine 0.95 0.55 - 1.02 MG/DL    BUN/Creatinine ratio 21 (H) 12 - 20      GFR est AA >60 >60 ml/min/1.73m2    GFR est non-AA 57 (L) >60 ml/min/1.73m2    Calcium 8.3 (L) 8.5 - 10.1 MG/DL   HEMOGLOBIN    Collection Time: 02/06/20  2:29 AM   Result Value Ref Range    HGB 8.4 (L) 11.5 - 16.0 g/dL   PROTHROMBIN TIME + INR    Collection Time: 02/06/20  2:29 AM   Result Value Ref Range    INR 1.2 (H) 0.9 - 1.1      Prothrombin time 11.9 (H) 9.0 - 11.1 sec         Assessment:     Status Post right Total Hip Arthroplasty. Doing well postoperatively. .  Bandage aquacel, clear/dry. Labs stable. PT progressing slowly. Pain control improving. Plan:     Continues current post-op course  Continue PT per protocol. Lives alone, slow PT progression. Plan to discharge to SNF, may discharge when cleared by PT.                                                    Medicare Reason for Inpatient    Need for admitting the patient as an Inpatient:    Lack of pain control: YES    Slow PT Progress: YES    Exacerbation of Chronic Condition: [address] or SNF: YES    New onset medical conditions: NO

## 2020-02-06 NOTE — OP NOTES
[address]   OPERATIVE REPORT    Name:  Cindy Márquez  MR#:  [id number]  :  1940  ACCOUNT #:  [de-identified]  DATE OF SERVICE:  2020    PREOPERATIVE DIAGNOSES:  1. Advanced degenerative arthritis of right hip. 2.  Left total hip replacement. POSTOPERATIVE DIAGNOSES:  1. Advanced degenerative arthritis of right hip. 2.  Left total hip replacement. PROCEDURE PERFORMED:  Right total hip replacement. SURGEON:  Bebo Dean MD    ASSISTANT:  Chloe Wiggins PA-C    ANESTHESIA:  Spinal.    COMPLICATIONS:  None. SPECIMENS REMOVED:  Right femoral head. IMPLANTS:  Right total hip components as listed in the operative report. ESTIMATED BLOOD LOSS:  200 mL. DRAINS:  None. INDICATIONS:  The patient has previously undergone left total hip replacement. She now presents for right total hip replacement. PROCEDURE:  On the day of operation, the patient was taken to the operating room. Spinal anesthesia was administered. The patient was then placed in the left lateral decubitus position and positioned with [facility] positioner. Antibiotics were given. Consent was confirmed. The right lower extremity was prepped and draped in the usual fashion. A mini posterolateral incision was made over the hip. It was carried through subcutaneous tissue. The tensor fascia neto was sharply divided. Fascia of the gluteal muscles divided in line with their fibers. The Charnley retractor was carefully placed. External rotator muscles were taken down. A T-shaped posterior capsular incision was made. Advanced degenerative changes of the hip were noted. The hip was dislocated. Oscillating saw was used to make the femoral neck osteotomy. Retractors were carefully placed around the acetabulum. Acetabulum was debrided of soft tissue and osteophytes. Acetabulum was then reamed to 51 mm and felt to have good coverage and good bone quality at this point.   A 52 mm [place] [employer] technology acetabular component was press fit into place and felt to have a tight fit with proper position. One superior screw was placed, 36 mm trial liner placed. Attention was then turned to the femur. Box osteotome was utilized. Femur was reamed to a #5 in the Philip system. It was broached to #5 in the Philip system. The various head and neck trials were utilized. The standard -2 provided the best fit, range of motion, with proper leg lengths and proper stability in all planes of motion. X-ray confirmed good position of the components. The trial components were then removed. The 36-mm polyethylene liner was placed in the acetabulum. The femur was thoroughly irrigated with pulse irrigation as well as antibiotic solution. The #5 standard Philip Por[place] femoral stem was press fit into place and felt to have a tight fit. The -2 x 36 head was felt to be appropriate and reduced. It was felt to be stable in all ranges of motion. It was stable at 90 degrees with internal rotation of 80 degrees. It was anteriorly stable as well. The incision was sterilely irrigated with antibiotic solution. The posterior capsule repaired with #1 Vicryl suture. Fascia closed with a combination of #1 and #2 Vicryl, supplemented with #2 PDS, 2-0 Vicryl was used to close the subcutaneous tissue and staples used to close the skin. Sterile dressings were applied. The patient was taken to the recovery room in satisfactory condition. The assistant, William Padilla PA-C, assisted me with positioning, retraction, implant installation, and incision closure.       Hernan Little MD      [email or web address]  D:  02/06/2020 8:43  T:  02/06/2020 13:33  JOB #:  [id number]

## 2020-02-06 NOTE — PROGRESS NOTES
Problem: Mobility Impaired (Adult and Pediatric)  Goal: *Acute Goals and Plan of Care (Insert Text)  Description  FUNCTIONAL STATUS PRIOR TO ADMISSION: Patient was independent and active without use of DME.    HOME SUPPORT PRIOR TO ADMISSION: The patient lived alone with family to provide assistance if needed. Physical Therapy Goals  Initiated 2/5/2020    1. Patient will move from supine to sit and sit to supine , scoot up and down and roll side to side in bed with modified independence within 4 days. 2. Patient will perform sit to stand with modified independence within 4 days. 3. Patient will ambulate with modified independence for 150 feet with the least restrictive device within 4 days. 4. Patient will ascend/descend 4 stairs with cane and one handrail with modified independence within 4 days. 5. Patient will perform post-OLIVE home exercise program per protocol with independence within 4 days. Outcome: Progressing Towards Goal   PHYSICAL THERAPY TREATMENT  Patient: Gely Calvillo (1 y.o. female)  Date: 2/6/2020  Diagnosis: Osteoarthritis of right hip [M16.11]   Primary localized osteoarthritis of right hip  Procedure(s) (LRB):  RIGHT TOTAL HIP ARTHROPLASTY (Right) 1 Day Post-Op  Precautions: Fall, PWB  Chart, physical therapy assessment, plan of care and goals were reviewed. ASSESSMENT  Patient continues with skilled PT services and is progressing towards goals. Pt supine in bed upon arrival. Pt was able to transfer supine to sit without physical assistance, but required additional time. Pt requested to use BSC before gait. Pt able to perform toileting hygiene with UE on walker and leaning on PT for support. Pt ambulated 10ft with RW and CGA. Pt requested to turn around d/t to dizziness and pain. Observed SOB during activity. Reviewed PLB with pt. The main limiting factors to pt progress is pain and decreased activity tolerance.  Pt will benefit from SNF level rehab to return to baseline of independent and active. Current Level of Function Impacting Discharge (mobility/balance): CGA with functional mobility    Other factors to consider for discharge: independent and active at baseline, pain management         PLAN :  Patient continues to benefit from skilled intervention to address the above impairments. Continue treatment per established plan of care. to address goals. Recommendation for discharge: (in order for the patient to meet his/her long term goals)  Therapy up to 5 days/week in SNF setting    This discharge recommendation:  Has not yet been discussed the attending provider and/or case management    IF patient discharges home will need the following DME: patient owns DME required for discharge       SUBJECTIVE:   Patient stated \"I'm going to turn around before I get dizzy.     OBJECTIVE DATA SUMMARY:   Critical Behavior:  Neurologic State: Alert  Orientation Level: Oriented X4  Cognition: Appropriate for age attention/concentration  Safety/Judgement: Good awareness of safety precautions  Functional Mobility Training:  Bed Mobility:     Supine to Sit: Stand-by assistance; Additional time  Sit to Supine: Stand-by assistance(Flat bed)           Transfers:  Sit to Stand: Contact guard assistance;Assist x1  Stand to Sit: Contact guard assistance; Additional time;Assist x1        Bed to Chair: Minimum assistance                    Balance:  Sitting: Intact  Standing: Impaired; With support  Standing - Static: Constant support;Good  Standing - Dynamic : Constant support; Fair  Ambulation/Gait Training:  Distance (ft): 10 Feet (ft)  Assistive Device: Gait belt;Walker, rolling  Ambulation - Level of Assistance: Contact guard assistance        Gait Abnormalities: Antalgic;Decreased step clearance;Shuffling gait; Step to gait        Base of Support: Narrowed  Stance: Right decreased  Speed/Love: Pace decreased (<100 feet/min); Shuffled  Step Length: Left shortened;Right shortened  Swing Pattern: Right asymmetrical          Therapeutic Exercises:   PLB  Pain Rating:  C/o R Hip pain. Pt reported \"it's not a 10 just yet\"    Activity Tolerance:   Fair, requires rest breaks, and observed SOB with activity  Please refer to the flowsheet for vital signs taken during this treatment. After treatment patient left in no apparent distress:   Supine in bed, Call bell within reach, and Side rails x 3    COMMUNICATION/COLLABORATION:   The patients plan of care was discussed with: Registered Nurse    DIANA Rma   Time Calculation: 19 mins    Regarding student involvement in patient care:  A student participated in this treatment session. Per CMS Medicare statements and APTA guidelines I certify that the following was true:  1. I was present and directly observed the entire session. 2. I made all skilled judgments and clinical decisions regarding care. 3. I am the practitioner responsible for assessment, treatment, and documentation.

## 2020-02-06 NOTE — ROUTINE PROCESS
Bedside shift change report given to Dameon Singleton (oncoming nurse) by Corinne Edgar (offgoing nurse). Report included the following information SBAR.

## 2020-02-06 NOTE — PROGRESS NOTES
[facility]: [facility] accept patient. Insurance Jessica Flores will need to be obtained prior to discharge. Care Management Interventions  PCP Verified by CM: Yes  Mode of Transport at Discharge: Other (see comment)(family/car)  Transition of Care Consult (CM Consult): Discharge Planning, SNF  MyChart Signup: No  Physical Therapy Consult: Yes  Occupational Therapy Consult: Yes  Speech Therapy Consult: No  Current Support Network: Lives Alone, Family Lives Nearby  Confirm Follow Up Transport: Family  The Plan for Transition of Care is Related to the Following Treatment Goals : SNF  The Patient and/or Patient Representative was Provided with a Choice of Provider and Agrees with the Discharge Plan?: Yes  Freedom of Choice List was Provided with Basic Dialogue that Supports the Patient's Individualized Plan of Care/Goals, Treatment Preferences and Shares the Quality Data Associated with the Providers?: Yes  Discharge Location  Discharge Placement: Skilled nursing facility     Reason for Admission: RIGHT TOTAL HIP ARTHROPLASTY                     RUR Score:  9%                   Plan for utilizing home health:     Patient prefers to go to SNF at [facility]. FOC signed and placed on hard chart. Current Advanced Directive/Advance Care Plan: on file                         Transition of Care Plan:  SNF. CM met with patient at bedside. Patient lives alone and was independent prior to admission. Patient has supportive family, a brother and sister-in-law and a niece who are supportive. Patient has been to [facility] in the past and would like to go there again. CM sent referral via [employer]. CM will continue to follow. CM offered screening for Medicaid Long-Term Services & Supports. Patient declined screening.     Dilia George, BSW/CRM

## 2020-02-06 NOTE — DISCHARGE INSTRUCTIONS
DC Orders All Total Hips    Case Management for DC planning to SNF .   - PT 2 times a week for 2 weeks; 50% WBAT with AVOID sudden and extreme movement of your hip (surgical leg). - Xarelto therapy once daily for 35 days post-operatively              - Remove AQUACEL bandage on the 7th day post-operatively (PLEASE reference discharge instructions INCISION CARE for additional information). .   - Remove staples at 2 weeks post-op; 2/19/20 .   - Follow up in Office in 2 weeks. After [facility] for SNF/Rehab    Discharge Instructions Hip Replacement-Dr. Amberly Parker    Patient Name: Samuel Notice  Date of procedure: 2/5/2020    Procedure: Procedure(s):  RIGHT TOTAL HIP ARTHROPLASTY  Surgeon: Izabella Davis) and Role:     Kathleen Jaramillo MD - Primary   PCP: Francy Bowen MD  Date of discharge: No discharge date for patient encounter. Follow up appointments   Follow up with Dr. Amberly Parker in 2 weeks. Call [phone] to make an appointment. Activity   50% weight bearing with walker or crutches for 2 weeks, then as tolerated. Refer to your handbook for instructions and pictures   Complete you Home Exercise Program daily as instructed by your therapist. Refer to your handbook for instructions and pictures   Get up every one hour and walk (except at night when sleeping)   AVOID sudden and extreme movement of your hip (surgical leg)   Do not drive or operate heavy machinery    Incision Care   The Aquacel (brown, waterproof) surgical dressing is to remain on your hip for 7 days. On the 7th day have someone gently peel the dressing off by carefully lifting the edge and stretching it slightly to break the adhesive seal   If the home health agency has not removed the Aquacel bandage by the 7th day please remove it yourself   You will have staples in your hip incision.  They will be removed in 2 weeks   If your Aquacel dressing comes loose/off before the 7th day, you may replace it with a dry sterile gauze dressing; change it daily. Once your incision is not draining, you may leave it open to air   You may take a shower with the Aquacel dressing in place. Once the Aquacel is removed, you may shower and get your incision wet but do not submerge your incision under water in a bath tub, hot tub or swimming pool for 6 weeks after surgery. Preventing blood clots   Take Xarelto 10 mg once daily for 35 days post-operatively   Wear elastic stockings (TEDS) for 4 weeks. Remove them for approximately 1 hour daily for showering/sponge bathing    Pain management   Keep ice wrap in place except when walking; changing gel packs every 4 hours   Lie down and elevate your leg on pillows for about 30 minutes after walking to decrease swelling and pain    Do ankle pumps (10 repetitions) every hour while awake and get up frequently to walk    Take Tylenol 500mg every 6 hours for 2 weeks    Take narcotic pain pill as prescribed if needed. Take with food; avoid alcohol while taking pain medication.  Decrease the amount of narcotic pain medication as your pain lessens     Diet   Resume usual diet; encourage fluids; provide foods high in fiber   Provide stool softeners/laxatives as needed

## 2020-02-06 NOTE — PROGRESS NOTES
Bedside shift change report given to Jere Paul RN (oncoming nurse) by ELIECER Jernigan RN (offgoing nurse). Report included the following information SBAR, Kardex and Recent Results.

## 2020-02-07 VITALS
HEART RATE: 71 BPM | OXYGEN SATURATION: 99 % | WEIGHT: 138 LBS | TEMPERATURE: 98.5 F | BODY MASS INDEX: 26.07 KG/M2 | SYSTOLIC BLOOD PRESSURE: 139 MMHG | RESPIRATION RATE: 18 BRPM | DIASTOLIC BLOOD PRESSURE: 95 MMHG

## 2020-02-07 PROCEDURE — 97116 GAIT TRAINING THERAPY: CPT

## 2020-02-07 PROCEDURE — 97110 THERAPEUTIC EXERCISES: CPT

## 2020-02-07 PROCEDURE — 74011250637 HC RX REV CODE- 250/637: Performed by: PHYSICIAN ASSISTANT

## 2020-02-07 RX ADMIN — LEVOTHYROXINE SODIUM 25 MCG: 0.05 TABLET ORAL at 06:57

## 2020-02-07 RX ADMIN — RIVAROXABAN 10 MG: 10 TABLET, FILM COATED ORAL at 11:34

## 2020-02-07 RX ADMIN — ACETAMINOPHEN 650 MG: 325 TABLET ORAL at 08:43

## 2020-02-07 RX ADMIN — ACETAMINOPHEN 650 MG: 325 TABLET ORAL at 15:24

## 2020-02-07 RX ADMIN — ACETAMINOPHEN 650 MG: 325 TABLET ORAL at 03:31

## 2020-02-07 RX ADMIN — CELECOXIB 200 MG: 200 CAPSULE ORAL at 08:43

## 2020-02-07 RX ADMIN — Medication 10 ML: at 14:00

## 2020-02-07 RX ADMIN — Medication 10 ML: at 03:31

## 2020-02-07 NOTE — PROGRESS NOTES
Problem: Mobility Impaired (Adult and Pediatric)  Goal: *Acute Goals and Plan of Care (Insert Text)  Description  FUNCTIONAL STATUS PRIOR TO ADMISSION: Patient was independent and active without use of DME.    HOME SUPPORT PRIOR TO ADMISSION: The patient lived alone with family to provide assistance if needed. Physical Therapy Goals  Initiated 2/5/2020    1. Patient will move from supine to sit and sit to supine , scoot up and down and roll side to side in bed with modified independence within 4 days. 2. Patient will perform sit to stand with modified independence within 4 days. 3. Patient will ambulate with modified independence for 150 feet with the least restrictive device within 4 days. 4. Patient will ascend/descend 4 stairs with cane and one handrail with modified independence within 4 days. 5. Patient will perform post-OLIVE home exercise program per protocol with independence within 4 days. Outcome: Progressing Towards Goal    PHYSICAL THERAPY TREATMENT  Patient: Natasha Carr (77 y.o. female)  Date: 2/7/2020  Diagnosis: Osteoarthritis of right hip [M16.11]   Primary localized osteoarthritis of right hip  Procedure(s) (LRB):  RIGHT TOTAL HIP ARTHROPLASTY (Right) 2 Days Post-Op  Precautions: Fall, PWB  Chart, physical therapy assessment, plan of care and goals were reviewed. ASSESSMENT  Patient continues with skilled PT services and is progressing towards goals. Patient able to increase weight acceptance into the surgical limb. Tolerated supine exercises but needed seated rest during gait 2x5ft due to c/o of lightheadedness. Continues to benefit from skilled PT to address impairements     Current Level of Function Impacting Discharge (mobility/balance): Rajan-CGA for bed mobility, gait and transfers with RW    Other factors to consider for discharge: assistance needed         PLAN :  Patient continues to benefit from skilled intervention to address the above impairments.   Continue treatment per established plan of care. to address goals. Recommendation for discharge: (in order for the patient to meet his/her long term goals)  Therapy up to 5 days/week in SNF setting    This discharge recommendation:  Has been made in collaboration with the attending provider and/or case management    IF patient discharges home will need the following DME: none       SUBJECTIVE:   Patient stated Jeffrey Fam got the other one done too.  Hip replaced 2 years prior. OBJECTIVE DATA SUMMARY:   Critical Behavior:  Neurologic State: Alert, Appropriate for age  Orientation Level: Appropriate for age, Oriented X4  Cognition: Appropriate decision making, Appropriate for age attention/concentration, Appropriate safety awareness, Follows commands  Safety/Judgement: Good awareness of safety precautions      Functional Mobility Training:  Bed Mobility:     Supine to Sit: Stand-by assistance; Additional time  Sit to Supine: Supervision           Transfers:  Sit to Stand: Contact guard assistance; Additional time  Stand to Sit: Contact guard assistance; Additional time                             Balance:  Sitting: Intact  Standing: Impaired; With support  Standing - Static: Constant support;Good  Standing - Dynamic : Constant support; Fair  Ambulation/Gait Training:  Distance (ft): 10 Feet (ft)(2x5)  Assistive Device: Walker, rolling;Gait belt  Ambulation - Level of Assistance: Contact guard assistance        Gait Abnormalities: Antalgic; Step to gait        Base of Support: Narrowed  Stance: Right decreased  Speed/Love: Pace decreased (<100 feet/min)  Step Length: Right shortened;Left shortened                  Therapeutic Exercises:     EXERCISE   Sets   Reps   Active Active Assist   Passive Self ROM   Comments   Ankle Pumps 1 20 [x]                                        []                                        []                                        []                                           Quad Sets 1 10 [x] []                                        []                                        []                                           Hamstring Sets   []                                        []                                        []                                        []                                           Short Arc Quads 1 5 [x]                                        []                                        []                                        []                                           Knee Extension Stretch     []                                          []                                          []                                          []                                           Heel Slides 1 10 []                                        [x]                                        []                                        []                                           Hip ER 1 10 [x]                                        []                                        []                                        []                                           Hip add with towel 1 10 [x]                                        []                                        []                                        []                                           Glut set 1 10 [x]                                        []                                        []                                        []                                               Pain Ratin-8/10    Activity Tolerance:   Good  Please refer to the flowsheet for vital signs taken during this treatment.     After treatment patient left in no apparent distress:   Sitting in chair and Call bell within reach    COMMUNICATION/COLLABORATION:   The patients plan of care was discussed with: Occupational Therapist and Registered Nurse    Kymberly Peters, PT   Time Calculation: 32 mins

## 2020-02-07 NOTE — PROGRESS NOTES
Attempted to see patient for pm session. Patient reports she will be dc to [facility] this pm. Politely declining pm session.  Cole Reyna, PT

## 2020-02-07 NOTE — PROGRESS NOTES
[facility]: [facility] accept patient, pending auth. Insurance [employer] was started today. Patient prefers ambulance vs. [facility]. She would prefer to wait until she knows when she is leaving to decide mode of transport.     Chary Mclaughlin, BSW/CRM

## 2020-02-07 NOTE — PROGRESS NOTES
CRICKET-D/c to [facility] today. MARIA A received a call from Kaleb Chavez at [address] this CM that they have obtained auth for this pt to be admitted there for rehab. CM informed Ashli MONTALVO and he confirmed that pt is ready for d/c. CM met with pt to inform her and she is in agreement with plan. She would like to be transported via ambulance. MARIA A set up ambulance with [facility] for 4pm request. An envelope containing pt's kardex, MAR and AVS will be sent with pt to [facility]. Also, pt's nurse will call report. Maria A attempted to call pt's sisterChrista ([phone]) per pt request. MARIA A was able to leave the sister a voice mail informing her of pt's d/c today.      Zahra Doherty

## 2020-02-07 NOTE — PROGRESS NOTES
TOTAL HIP PROGRESS NOTE      Subjective:     Post-Operative Day: 2 Status Post right Total Hip Arthroplasty  Systemic or Specific Complaints:No Complaints    Objective:     Patient Vitals for the past 24 hrs:   BP Temp Pulse Resp SpO2   02/07/20 0330 109/66 98.5 °F (36.9 °C) 76 15 96 %   02/06/20 1920 129/70 98.9 °F (37.2 °C) 73 16 98 %   02/06/20 1424 106/57 98.4 °F (36.9 °C) 67 15 97 %   02/06/20 0902 100/53 98.3 °F (36.8 °C) 82 15 96 %       General: alert, cooperative, no distress, appears stated age   Wound: Wound clean and dry no evidence of infection. , No Erythema, No Edema, No Drainage and Wound Intact   Motion: Painless Range of Motion   DVT Exam: No evidence of DVT seen on physical exam.  Negative Braydon's sign. No cords or calf tenderness. Data Review:  No results found for this or any previous visit (from the past 24 hour(s)). Assessment:     Status Post right Total Hip Arthroplasty. Doing well postoperatively. .  Bandage aquacel, clean/dry. Labs stable. PT progressing well. Pain control WNL. Plan:     Continues current post-op course  Continue PT per protocol. Plan to discharge to SNF, previously went to [facility].

## 2020-02-07 NOTE — PROGRESS NOTES
Problem: Falls - Risk of  Goal: *Absence of Falls  Description  Document Fabián Dao Fall Risk and appropriate interventions in the flowsheet. Outcome: Progressing Towards Goal  Note: Fall Risk Interventions:  Mobility Interventions: PT Consult for mobility concerns         Medication Interventions: Patient to call before getting OOB    Elimination Interventions: Call light in reach            [id number]  Bedside shift change report given to Aurora Carlson [place] (oncoming nurse) by [employer], RN (offgoing nurse). Report included the following information SBAR, Kardex and Recent Results.

## 2020-02-07 NOTE — PROGRESS NOTES
Problem: Falls - Risk of  Goal: *Absence of Falls  Description  Document Liudmila Latin Fall Risk and appropriate interventions in the flowsheet.   Outcome: Progressing Towards Goal  Note: Fall Risk Interventions:  Mobility Interventions: PT Consult for mobility concerns         Medication Interventions: Patient to call before getting OOB    Elimination Interventions: Call light in reach

## 2020-02-07 NOTE — ROUTINE PROCESS
Bedside shift change report given to [address] (oncoming nurse) by Kaye Talley (offgoing nurse). Report included the following information SBAR.

## 2020-02-08 NOTE — PROGRESS NOTES
Bedside report given to [address] [place] [address] by Johnson Freitas RN, report included SBAR, MAR, I/O's, PMH

## 2020-02-10 ENCOUNTER — PATIENT OUTREACH (OUTPATIENT)
Dept: CARDIOLOGY CLINIC | Age: 80
End: 2020-02-10

## 2020-02-10 NOTE — PROGRESS NOTES
Transition of care outreach postponed for 14 days due to patient's discharge to SNF. Per EMR patient discharged to [facility] and [address] [facility].

## 2020-02-14 NOTE — DISCHARGE SUMMARY
Discharge Summary    Physician: Gerber Sanchez MD    Physician Assistant: John Henderson PA-C    Admit Diagnosis:  Osteoarthritis of right hip [M16.11]    Final Diagnosis:   1. Advanced degenerative arthritis of right hip . Procedure: Right total hip replacement    Complications: None. History of Present Illness: The patient has a long-standing history of pain within the right hip . The patient has severe degenerative arthritis of the right hip and has exhausted conservative therapy at this time including prior intra-articular injections, activity modifications, OTC NSAIDS. The patient has been limited in their ability to perform ADLs, walk short distances or climb steps appropriately. The patient presents for the above-mentioned procedure. The patient has been cleared from a medical and cardiac standpoint. Past Medical History:  Recorded in the chart. Physical Examination: Also recorded in the chart. The patient is noted for ambulating with an antalgic gait favoring the right side. Examination of the right hip reveals 80% reduction in rotation with pain. Skin intact. No effusion. No sign of infection. No ecchymosis or erythema. No cellulitis or rash. No calf pain, swelling, or other evidence of DVT. I detect no obvious motor or sensory deficits in the lower extremity. The neurovascular status is intact . X-rays reveal a large chandni-acetabular osteophyte. No gross pelvic abnormalities. Degenerative changes noted in the lower lumbar spine. No fractures or evidence of metastatic disease. Course in the Hospital:  The patient was admitted to the hospital.  The Pt. Underwent a right total hip replacement . Postoperatively, the pt. did well. The pt. Remained stable from a medical standpoint. The patient ambulated, WBAT weightbearing within the hospital with Physical Therapy. The patient continued with this therapy at [address] with PT.   The patient began taking Xarelto post-operatively for DVT prophylaxis and will continue on this for 12 days. At the time of discharge, there was no evidence of any DVT noted. The patient had negative Homans signs bilateral lower extremities. At the time of discharge, the patient's right hip incision appeared with staples intact. No signs of infection or inflammation noted. The patient will follow up in our office in 2-1/2 weeks. DC med list:  Discharge Medication List as of 2/7/2020  1:08 PM      START taking these medications    Details   oxyCODONE IR (ROXICODONE) 5 mg immediate release tablet Take 1 Tab by mouth every four (4) hours as needed for Pain for up to 7 days. Max Daily Amount: 30 mg., Print, Disp-40 Tab, R-0      rivaroxaban (XARELTO) 10 mg tablet Take 1 Tab by mouth daily (with lunch) for 30 days.  Indications: deep vein thrombosis prevention in hip surgery, Print, Disp-30 Tab, R-0         CONTINUE these medications which have NOT CHANGED    Details   acetaminophen (TYLENOL ARTHRITIS PAIN) 650 mg TbER Take 650 mg by mouth as needed for Pain., Historical Med      levothyroxine (SYNTHROID) 25 mcg tablet take 1 tablet by mouth once daily BEFORE BREAKFAST., Normal, Disp-90 Tab, R-0      amLODIPine (NORVASC) 5 mg tablet take 1 tablet by mouth once daily, Normal, Disp-90 Tab, R-3      simvastatin (ZOCOR) 40 mg tablet take 1 tablet by mouth NIGHTLY, Normal, Disp-30 Tab, R-11      celecoxib (CELEBREX) 200 mg capsule take 1 capsule by mouth twice a day PRN, Normal, Disp-60 Cap, R-6         STOP taking these medications       aspirin delayed-release 81 mg tablet Comments:   Reason for Stopping:               Patient Disposition: Home  Followup Care:  Discharge Condition: good  PT/OT Eval and Treat  Regular Diet  As directed    Follow-up Information     Follow up With Specialties Details Why Contact Info    Jameel Gonzales MD Internal Medicine   [address] [address]                    Tigre Sim PA-C

## 2020-02-24 ENCOUNTER — PATIENT OUTREACH (OUTPATIENT)
Dept: INTERNAL MEDICINE CLINIC | Age: 80
End: 2020-02-24

## 2020-02-24 NOTE — PROGRESS NOTES
Outgoing call placed to [facility] for update regarding update on pt status. Spoke with staff patient identified utilizing 2 identifiers. Introduced self and reason for the call. Per staff member patient currently admitted to the facility.

## 2020-03-09 ENCOUNTER — PATIENT OUTREACH (OUTPATIENT)
Dept: INTERNAL MEDICINE CLINIC | Age: 80
End: 2020-03-09

## 2020-03-09 NOTE — PROGRESS NOTES
Outgoing call placed to [facility] and [address] [place] [address] regarding update patient status. Spoke with Xiao Wright patient identified utilizing 2 identifiers. Reintroduced self and reason for the call. Xiao Wright reports patient currently admitted to the facility.  Patient has been discharged from the hospital greater than 30 day Transition of Care episode closed

## 2020-06-15 ENCOUNTER — VIRTUAL VISIT (OUTPATIENT)
Dept: INTERNAL MEDICINE CLINIC | Age: 80
End: 2020-06-15

## 2020-06-15 DIAGNOSIS — Z96.642 HISTORY OF TOTAL HIP ARTHROPLASTY, LEFT: ICD-10-CM

## 2020-06-15 DIAGNOSIS — F17.210 CIGARETTE SMOKER: ICD-10-CM

## 2020-06-15 DIAGNOSIS — J44.9 CHRONIC OBSTRUCTIVE PULMONARY DISEASE, UNSPECIFIED COPD TYPE (HCC): ICD-10-CM

## 2020-06-15 DIAGNOSIS — R73.03 PREDIABETES: ICD-10-CM

## 2020-06-15 DIAGNOSIS — I10 ESSENTIAL HYPERTENSION: Primary | ICD-10-CM

## 2020-06-15 DIAGNOSIS — Z96.641 H/O TOTAL HIP ARTHROPLASTY, RIGHT: ICD-10-CM

## 2020-06-15 NOTE — PROGRESS NOTES
Dariusz Martin is a [de-identified] y.o. female evaluated via audio only technology on 6/15/2020. Consent: She and/or her health care decision maker is aware that she may receive a bill for this audio only encounter, depending on her insurance coverage, and has provided verbal consent to proceed: Yes    I communicated with the patient and/or health care decision maker about the nature and details of the following:  Assessment & Plan:   Diagnoses and all orders for this visit:    1. Essential hypertension we encourage her to continue take her amlodipine for blood pressure control as that has been keeping her blood pressure at a reasonable level. When she has the opportunity we encouraged him to check. 2. Chronic obstructive pulmonary disease, unspecified COPD type ([facility] [address] COPD seems to be stable. Her shortness of breath is not any worse. 3. Cigarette smoker we congratulated her she has not had a cigarette since February. We encourage her never to go back. 4. Prediabetes in January hemoglobin A1c was 5.9 confirm prediabetes. 5. History of total hip arthroplasty, left    6. H/O total hip arthroplasty, right        12  Subjective:   Dariusz Martin is a [de-identified] y.o. female who was seen for Hypertension  Patient seen today with known history of of primary hypertension, COPD, cigarette abuse, prediabetes, status post left and right total hip arthroplasty. She voices no new complaints although when she saw her podiatrist related to her left hip upon movement 5 minutes ago she believes. Patient seen for evaluation. Prior to Admission medications    Medication Sig Start Date End Date Taking? Authorizing Provider   celecoxib (CELEBREX) 200 mg capsule TAKE 1 CAPSULE BY MOUTH  TWICE A DAY AS NEEDED 4/22/20  Yes Jose L Stern MD   acetaminophen (TYLENOL ARTHRITIS PAIN) 650 mg TbER Take 650 mg by mouth as needed for Pain.    Yes Provider, Historical   levothyroxine (SYNTHROID) 25 mcg tablet take 1 tablet by mouth once daily BEFORE BREAKFAST. 12/10/19  Yes Galileo Thomas MD   amLODIPine (NORVASC) 5 mg tablet take 1 tablet by mouth once daily  Patient taking differently: 5 mg Daily (before breakfast).  take 1 tablet by mouth once daily 9/23/19  Yes Galileo Thomas MD   simvastatin (ZOCOR) 40 mg tablet take 1 tablet by mouth NIGHTLY 7/3/19  Yes Galileo Thomas MD     Allergies   Allergen Reactions    Bee Venom Protein (Honey Bee) Swelling    Flu Vac 2018 [id number](Pf) Other (comments)     LOCK JAW    Lisinopril Hives and Swelling       Patient Active Problem List   Diagnosis Code    Hypertension I10    Arthritis M19.90    COPD (chronic obstructive pulmonary disease) ([facility] [address] J44.9    Left hip pain M25.552    Cigarette smoker F17.210    Hip pain, left M25.552    Onychomycosis B35.1    Epicondylitis elbow, medial, right M77.01    Prediabetes R73.03    Primary osteoarthritis of left hip M16.12    Anemia D64.9    Hip pain, chronic, right M25.551, G89.29    Primary localized osteoarthritis of right hip M16.11    Osteoarthritis of right hip M16.11    History of total hip arthroplasty, left Z25.688    H/O total hip arthroplasty, right Z96.641    Ex-cigarette smoker Z87.891     Patient Active Problem List    Diagnosis Date Noted    Ex-cigarette smoker     H/O total hip arthroplasty, right 02/07/2020    Osteoarthritis of right hip 02/05/2020    Primary localized osteoarthritis of right hip 01/31/2020    Hip pain, chronic, right     Anemia 07/13/2018    Primary osteoarthritis of left hip 07/06/2018    History of total hip arthroplasty, left 2018    Epicondylitis elbow, medial, right     Onychomycosis     Hip pain, left     Cigarette smoker     Prediabetes 11/13/2014    Hypertension     Arthritis     COPD (chronic obstructive pulmonary disease) (HCC)     Left hip pain      Current Outpatient Medications   Medication Sig Dispense Refill    celecoxib (CELEBREX) 200 mg capsule TAKE 1 CAPSULE BY MOUTH  TWICE A DAY AS NEEDED 60 Cap 1    acetaminophen (TYLENOL ARTHRITIS PAIN) 650 mg TbER Take 650 mg by mouth as needed for Pain.  levothyroxine (SYNTHROID) 25 mcg tablet take 1 tablet by mouth once daily BEFORE BREAKFAST. 90 Tab 0    amLODIPine (NORVASC) 5 mg tablet take 1 tablet by mouth once daily (Patient taking differently: 5 mg Daily (before breakfast).  take 1 tablet by mouth once daily) 90 Tab 3    simvastatin (ZOCOR) 40 mg tablet take 1 tablet by mouth NIGHTLY 30 Tab 11     Allergies   Allergen Reactions    Bee Venom Protein (Honey Bee) Swelling    Flu Vac 2018 [id number](Pf) Other (comments)     LOCK JAW    Lisinopril Hives and Swelling     Past Medical History:   Diagnosis Date    Arthritis     Chronically on opiate therapy     COPD (chronic obstructive pulmonary disease) (HCC)     Elevated cholesterol     Epicondylitis elbow, medial, right     Ex-cigarette smoker     ct 6/25/13  stopped feb 2020    H/O total hip arthroplasty, right 02/07/2020    tejas aviles md    High cholesterol     Hip pain, chronic, right 02/07/2020    thr- rick aviles md    History of total hip arthroplasty, left 2018    Hypertension     Hypothyroid     IGT (impaired glucose tolerance) 11/13/2014    Onychomycosis     S/P colonoscopy 12-12-13    cecal lipoma    S/P colonoscopy 12/12/2013    hemorrhoids,diverticulosis    Tubulovillous adenoma of colon 2008     Past Surgical History:   Procedure Laterality Date    HX BREAST BIOPSY Left 1980    BENIGN    HX BUNIONECTOMY Left 1978    HX BUNIONECTOMY Right 2011    HX COLONOSCOPY      HX DILATION AND CURETTAGE  1980`S    HX GI      COLONOSCOPY    HX HEENT  2017    ORAL SURGERY TO REMOVE UPPER  TEETH     HX ORTHOPAEDIC Left 1980`S     BUNIONECTOMY     HX ORTHOPAEDIC Right 2015    BUNIONECTOMY    HX POLYPECTOMY      TOTAL HIP ARTHROPLASTY Left 2018     Family History   Problem Relation Age of Onset    Heart Disease Mother         PVD LEG AMPUTATION    Deep Vein Thrombosis Mother     Hypertension Mother     Alzheimer Mother     Hearing Impairment Father     Hypertension Father     Heart Failure Father     Stroke Father     Heart Attack Father     Diabetes Sister     Diabetes Brother     Heart Disease Brother         VALVULAR DISEASE    Anesth Problems Neg Hx      Social History     Tobacco Use    Smoking status: Light Tobacco Smoker     Packs/day: 0.50     Years: 60.00     Pack years: 30.00    Smokeless tobacco: Never Used   Substance Use Topics    Alcohol use: Not Currently     Comment: NONE IN PAST 5 YEARS       ROS    I affirm this is a Patient-Initiated Episode with a Patient who has not had a related appointment within my department in the past 7 days or scheduled within the next 24 hours.     Total Time: minutes: 5-10 minutes    Note: not billable if this call serves to triage the patient into an appointment for the relevant concern      Jessi Menchaca MD

## 2020-06-15 NOTE — PROGRESS NOTES
1. Have you been to the ER, urgent care clinic since your last visit? Hospitalized since your last visit? No    2. Have you seen or consulted any other health care providers outside of the [address] [place] [address] since your last visit? Include any pap smears or colon screening.  No

## 2020-06-18 RX ORDER — AMLODIPINE BESYLATE 5 MG/1
TABLET ORAL
Qty: 90 TAB | Refills: 3 | Status: SHIPPED | OUTPATIENT
Start: 2020-06-18 | End: 2021-06-29

## 2020-06-18 RX ORDER — LEVOTHYROXINE SODIUM 25 UG/1
TABLET ORAL
Qty: 90 TAB | Refills: 0 | Status: SHIPPED | OUTPATIENT
Start: 2020-06-18 | End: 2020-10-21

## 2020-06-18 RX ORDER — SIMVASTATIN 40 MG/1
TABLET, FILM COATED ORAL
Qty: 90 TAB | Refills: 3 | Status: SHIPPED | OUTPATIENT
Start: 2020-06-18 | End: 2021-04-13

## 2020-06-18 RX ORDER — CELECOXIB 200 MG/1
CAPSULE ORAL
Qty: 120 CAP | Refills: 0 | Status: SHIPPED | OUTPATIENT
Start: 2020-06-18 | End: 2020-09-02

## 2020-09-02 RX ORDER — CELECOXIB 200 MG/1
CAPSULE ORAL
Qty: 120 CAP | Refills: 5 | Status: SHIPPED | OUTPATIENT
Start: 2020-09-02 | End: 2021-10-10

## 2020-10-05 ENCOUNTER — OFFICE VISIT (OUTPATIENT)
Dept: INTERNAL MEDICINE CLINIC | Age: 80
End: 2020-10-05
Payer: MEDICARE

## 2020-10-05 VITALS
DIASTOLIC BLOOD PRESSURE: 78 MMHG | HEART RATE: 61 BPM | BODY MASS INDEX: 27.7 KG/M2 | HEIGHT: 61 IN | WEIGHT: 146.7 LBS | SYSTOLIC BLOOD PRESSURE: 152 MMHG | TEMPERATURE: 97.9 F | RESPIRATION RATE: 16 BRPM | OXYGEN SATURATION: 95 %

## 2020-10-05 DIAGNOSIS — Z87.891 EX-CIGARETTE SMOKER: ICD-10-CM

## 2020-10-05 DIAGNOSIS — D64.9 ANEMIA, UNSPECIFIED TYPE: ICD-10-CM

## 2020-10-05 DIAGNOSIS — I10 ESSENTIAL HYPERTENSION: ICD-10-CM

## 2020-10-05 DIAGNOSIS — J44.9 CHRONIC OBSTRUCTIVE PULMONARY DISEASE, UNSPECIFIED COPD TYPE (HCC): ICD-10-CM

## 2020-10-05 DIAGNOSIS — Z00.00 MEDICARE ANNUAL WELLNESS VISIT, SUBSEQUENT: Primary | ICD-10-CM

## 2020-10-05 DIAGNOSIS — G62.9 NEUROPATHY: ICD-10-CM

## 2020-10-05 DIAGNOSIS — R73.03 PREDIABETES: ICD-10-CM

## 2020-10-05 DIAGNOSIS — Z13.31 SCREENING FOR DEPRESSION: ICD-10-CM

## 2020-10-05 DIAGNOSIS — M19.90 ARTHRITIS: ICD-10-CM

## 2020-10-05 PROCEDURE — 36415 COLL VENOUS BLD VENIPUNCTURE: CPT | Performed by: INTERNAL MEDICINE

## 2020-10-05 PROCEDURE — 1101F PT FALLS ASSESS-DOCD LE1/YR: CPT | Performed by: INTERNAL MEDICINE

## 2020-10-05 PROCEDURE — G8536 NO DOC ELDER MAL SCRN: HCPCS | Performed by: INTERNAL MEDICINE

## 2020-10-05 PROCEDURE — G8753 SYS BP > OR = 140: HCPCS | Performed by: INTERNAL MEDICINE

## 2020-10-05 PROCEDURE — G8754 DIAS BP LESS 90: HCPCS | Performed by: INTERNAL MEDICINE

## 2020-10-05 PROCEDURE — G0444 DEPRESSION SCREEN ANNUAL: HCPCS | Performed by: INTERNAL MEDICINE

## 2020-10-05 PROCEDURE — G8510 SCR DEP NEG, NO PLAN REQD: HCPCS | Performed by: INTERNAL MEDICINE

## 2020-10-05 PROCEDURE — G0439 PPPS, SUBSEQ VISIT: HCPCS | Performed by: INTERNAL MEDICINE

## 2020-10-05 PROCEDURE — G8399 PT W/DXA RESULTS DOCUMENT: HCPCS | Performed by: INTERNAL MEDICINE

## 2020-10-05 PROCEDURE — 1090F PRES/ABSN URINE INCON ASSESS: CPT | Performed by: INTERNAL MEDICINE

## 2020-10-05 PROCEDURE — G8427 DOCREV CUR MEDS BY ELIG CLIN: HCPCS | Performed by: INTERNAL MEDICINE

## 2020-10-05 PROCEDURE — G8419 CALC BMI OUT NRM PARAM NOF/U: HCPCS | Performed by: INTERNAL MEDICINE

## 2020-10-05 PROCEDURE — 99213 OFFICE O/P EST LOW 20 MIN: CPT | Performed by: INTERNAL MEDICINE

## 2020-10-05 RX ORDER — DULOXETIN HYDROCHLORIDE 30 MG/1
30 CAPSULE, DELAYED RELEASE ORAL DAILY
Qty: 30 CAP | Refills: 5 | Status: SHIPPED | OUTPATIENT
Start: 2020-10-05 | End: 2020-10-05 | Stop reason: SDUPTHER

## 2020-10-05 RX ORDER — DULOXETIN HYDROCHLORIDE 30 MG/1
30 CAPSULE, DELAYED RELEASE ORAL DAILY
Qty: 90 CAP | Refills: 5 | Status: SHIPPED | OUTPATIENT
Start: 2020-10-05 | End: 2021-03-08 | Stop reason: SINTOL

## 2020-10-05 NOTE — PROGRESS NOTES
1. Have you been to the ER, urgent care clinic since your last visit? Hospitalized since your last visit? No    2. Have you seen or consulted any other health care providers outside of the [address] [place] [address] since your last visit? Include any pap smears or colon screening. No  This is the Subsequent Medicare Annual Wellness Exam, performed 12 months or more after the Initial AWV or the last Subsequent AWV    I have reviewed the patient's medical history in detail and updated the computerized patient record.      History     Patient Active Problem List   Diagnosis Code    Hypertension I10    Arthritis M19.90    COPD (chronic obstructive pulmonary disease) ([facility] [address] J44.9    Left hip pain M25.552    Cigarette smoker F17.210    Hip pain, left M25.552    Onychomycosis B35.1    Epicondylitis elbow, medial, right M77.01    Prediabetes R73.03    Primary osteoarthritis of left hip M16.12    Anemia D64.9    Hip pain, chronic, right M25.551, G89.29    Primary localized osteoarthritis of right hip M16.11    Osteoarthritis of right hip M16.11    History of total hip arthroplasty, left D48.431    H/O total hip arthroplasty, right Z96.641    Ex-cigarette smoker Z87.891     Past Medical History:   Diagnosis Date    Arthritis     Chronically on opiate therapy     COPD (chronic obstructive pulmonary disease) (HCC)     Elevated cholesterol     Epicondylitis elbow, medial, right     Ex-cigarette smoker     ct 6/25/13  stopped feb 2020    H/O total hip arthroplasty, right 02/07/2020    tejas aviles md    High cholesterol     Hip pain, chronic, right 02/07/2020    thr- rick aviles md    History of total hip arthroplasty, left 2018    Hypertension     Hypothyroid     IGT (impaired glucose tolerance) 11/13/2014    Onychomycosis     S/P colonoscopy 12-12-13    cecal lipoma    S/P colonoscopy 12/12/2013    hemorrhoids,diverticulosis    Tubulovillous adenoma of colon 2008      Past Surgical History: Procedure Laterality Date    HX BREAST BIOPSY Left 1980    BENIGN    HX BUNIONECTOMY Left 1978    HX BUNIONECTOMY Right 2011    HX COLONOSCOPY      HX DILATION AND CURETTAGE  1980`S    HX GI      COLONOSCOPY    HX HEENT  2017    ORAL SURGERY TO REMOVE UPPER  TEETH     HX ORTHOPAEDIC Left 1980`S     BUNIONECTOMY     HX ORTHOPAEDIC Right 2015    BUNIONECTOMY    HX POLYPECTOMY      TOTAL HIP ARTHROPLASTY Left 2018     Current Outpatient Medications   Medication Sig Dispense Refill    celecoxib (CELEBREX) 200 mg capsule TAKE 1 CAPSULE BY MOUTH  TWICE A DAY AS NEEDED 120 Cap 5    levothyroxine (SYNTHROID) 25 mcg tablet TAKE 1 TABLET BY MOUTH ONCE DAILY BEFORE BREAKFAST 90 Tab 0    simvastatin (ZOCOR) 40 mg tablet TAKE 1 TABLET BY MOUTH  NIGHTLY 90 Tab 3    amLODIPine (NORVASC) 5 mg tablet TAKE 1 TABLET BY MOUTH ONCE DAILY 90 Tab 3    acetaminophen (TYLENOL ARTHRITIS PAIN) 650 mg TbER Take 650 mg by mouth as needed for Pain.        Allergies   Allergen Reactions    Bee Venom Protein (Honey Bee) Swelling    Flu Vac 2018 [id number](Pf) Other (comments)     LOCK JAW    Lisinopril Hives and Swelling       Family History   Problem Relation Age of Onset    Heart Disease Mother         PVD LEG AMPUTATION    Deep Vein Thrombosis Mother     Hypertension Mother     Alzheimer Mother     Hearing Impairment Father     Hypertension Father     Heart Failure Father     Stroke Father     Heart Attack Father     Diabetes Sister     Diabetes Brother     Heart Disease Brother         VALVULAR DISEASE    Anesth Problems Neg Hx      Social History     Tobacco Use    Smoking status: Light Tobacco Smoker     Packs/day: 0.50     Years: 60.00     Pack years: 30.00    Smokeless tobacco: Never Used   Substance Use Topics    Alcohol use: Not Currently     Comment: NONE IN PAST 5 YEARS       Depression Risk Factor Screening:     3 most recent PHQ Screens 10/5/2020   Little interest or pleasure in doing things Not at all   Feeling down, depressed, irritable, or hopeless Not at all   Total Score PHQ 2 0       Alcohol Risk Screen   Do you average more than 1 drink per night or more than 7 drinks a week:  No    On any one occasion in the past three months have you have had more than 3 drinks containing alcohol:  No        Functional Ability and Level of Safety:   Hearing: Hearing is good. Activities of Daily Living: The home contains: handrails and grab bars  Patient does total self care     Ambulation: with difficulty, uses a cane     Fall Risk:  Fall Risk Assessment, last 12 mths 10/5/2020   Able to walk? Yes   Fall in past 12 months?  No   Fall with injury? -   Number of falls in past 12 months -   Fall Risk Score -     Abuse Screen:  Patient is not abused       Cognitive Screening   Has your family/caregiver stated any concerns about your memory: no     Cognitive Screening: not necessary    Patient Care Team   Patient Care Team:  Kee Rodriguez MD as PCP - General (Internal Medicine)  Kee Rodriguez MD as PCP - REHABILITATION [facility] Empaneled Provider  Yaneth Rubio DPM (Podiatry)    Assessment/Plan   Education and counseling provided:  Are appropriate based on today's review and evaluation        Health Maintenance Due   Topic Date Due    GLAUCOMA SCREENING Q2Y  02/07/2020    Medicare Yearly Exam  08/12/2020    Lipid Screen  08/12/2020

## 2020-10-05 NOTE — PATIENT INSTRUCTIONS
Medicare Wellness Visit, Female The best way to live healthy is to have a lifestyle where you eat a well-balanced diet, exercise regularly, limit alcohol use, and quit all forms of tobacco/nicotine, if applicable. Regular preventive services are another way to keep healthy. Preventive services (vaccines, screening tests, monitoring & exams) can help personalize your care plan, which helps you manage your own care. Screening tests can find health problems at the earliest stages, when they are easiest to treat. [address] follows the current, evidence-based guidelines published by the [facility] Sarkis Deedee ([facility]) when recommending preventive services for our patients. Because we follow these guidelines, sometimes recommendations change over time as research supports it. (For example, mammograms used to be recommended annually. Even though Medicare will still pay for an annual mammogram, the newer guidelines recommend a mammogram every two years for women of average risk.) Of course, you and your doctor may decide to screen more often for some diseases, based on your risk and your health status. Preventive services for you include: - Medicare offers their members a free annual wellness visit, which is time for you and your primary care provider to discuss and plan for your preventive service needs. Take advantage of this benefit every year! 
-All adults over the age of 72 should receive the recommended pneumonia vaccines. Current USPSTF guidelines recommend a series of two vaccines for the best pneumonia protection.  
-All adults should have a flu vaccine yearly and a tetanus vaccine every 10 years. All adults age 48 and older should receive a shingles vaccine once in their lifetime.   
-A bone mass density test is recommended when a woman turns 65 to screen for osteoporosis. This test is only recommended one time, as a screening. Some providers will use this same test as a disease monitoring tool if you already have osteoporosis. -All adults age 38-68 who are overweight should have a diabetes screening test once every three years.  
-Other screening tests and preventive services for persons with diabetes include: an eye exam to screen for diabetic retinopathy, a kidney function test, a foot exam, and stricter control over your cholesterol.  
-Cardiovascular screening for adults with routine risk involves an electrocardiogram (ECG) at intervals determined by your doctor.  
-Colorectal cancer screenings should be done for adults age 54-65 with no increased risk factors for colorectal cancer. There are a number of acceptable methods of screening for this type of cancer. Each test has its own benefits and drawbacks. Discuss with your doctor what is most appropriate for you during your annual wellness visit. The different tests include: colonoscopy (considered the best screening method), a fecal occult blood test, a fecal DNA test, and sigmoidoscopy. -Breast cancer screenings are recommended every other year for women of normal risk, age 54-69. 
-Cervical cancer screenings for women over age 72 are only recommended with certain risk factors.  
-All adults born between [facility] should be screened once for Hepatitis C. Here is a list of your current Health Maintenance items (your personalized list of preventive services) with a due date: 
Health Maintenance Due Topic Date Due  Glaucoma Screening   02/07/2020 [address] [place] [address] Annual Well Visit  08/12/2020  Cholesterol Test   08/12/2020 Medicare Wellness Visit, Female The best way to live healthy is to have a lifestyle where you eat a well-balanced diet, exercise regularly, limit alcohol use, and quit all forms of tobacco/nicotine, if applicable. Regular preventive services are another way to keep healthy.  Preventive services (vaccines, screening tests, monitoring & exams) can help personalize your care plan, which helps you manage your own care. Screening tests can find health problems at the earliest stages, when they are easiest to treat. [facility] follows the current, evidence-based guidelines published by the [facility] Sarkis Mark ([facility]) when recommending preventive services for our patients. Because we follow these guidelines, sometimes recommendations change over time as research supports it. (For example, mammograms used to be recommended annually. Even though Medicare will still pay for an annual mammogram, the newer guidelines recommend a mammogram every two years for women of average risk). Of course, you and your doctor may decide to screen more often for some diseases, based on your risk and your co-morbidities (chronic disease you are already diagnosed with). Preventive services for you include: - Medicare offers their members a free annual wellness visit, which is time for you and your primary care provider to discuss and plan for your preventive service needs. Take advantage of this benefit every year! 
-All adults over the age of 72 should receive the recommended pneumonia vaccines. Current USPSTF guidelines recommend a series of two vaccines for the best pneumonia protection.  
-All adults should have a flu vaccine yearly and a tetanus vaccine every 10 years.  
-All adults age 48 and older should receive the shingles vaccines (series of two vaccines). -All adults age 38-68 who are overweight should have a diabetes screening test once every three years.  
-All adults born between [birth year] and 1965 should be screened once for Hepatitis C. 
-Other screening tests and preventive services for persons with diabetes include: an eye exam to screen for diabetic retinopathy, a kidney function test, a foot exam, and stricter control over your cholesterol. -Cardiovascular screening for adults with routine risk involves an electrocardiogram (ECG) at intervals determined by your doctor.  
-Colorectal cancer screenings should be done for adults age 54-65 with no increased risk factors for colorectal cancer. There are a number of acceptable methods of screening for this type of cancer. Each test has its own benefits and drawbacks. Discuss with your doctor what is most appropriate for you during your annual wellness visit. The different tests include: colonoscopy (considered the best screening method), a fecal occult blood test, a fecal DNA test, and sigmoidoscopy. 
 
-A bone mass density test is recommended when a woman turns 65 to screen for osteoporosis. This test is only recommended one time, as a screening. Some providers will use this same test as a disease monitoring tool if you already have osteoporosis. -Breast cancer screenings are recommended every other year for women of normal risk, age 54-69. 
-Cervical cancer screenings for women over age 72 are only recommended with certain risk factors. Here is a list of your current Health Maintenance items (your personalized list of preventive services) with a due date: 
Health Maintenance Due Topic Date Due  Glaucoma Screening   02/07/2020 Kendy Whyte Annual Well Visit  08/12/2020  Cholesterol Test   08/12/2020

## 2020-10-05 NOTE — PROGRESS NOTES
SPORTS MEDICINE AND PRIMARY CARE  Adelaide Aschoff, MD, [address] [address]  Phone:  [phone]  Fax: [phone]      Chief Complaint   Patient presents with    Annual Wellness Visit         SUBECTIVE:    Tabitha Whittaker is a [de-identified] y.o. female Patient returns today after being seen by [facility], Kit Gomes NP on 07/01/20 for a visit to her insurance company with a BP of 146/75, pulse 69, weight 140 pounds, and recommendations for renal studies, flu shot, mammogram, glaucoma screen, thyroid studies, fall risk prevention and advanced directive. Patient comes in today saying she does not want to take the flu shot because of three episodes of adverse effects related to the flu shot. She denies other specific complaints. She has a known history of primary hypertension, arthritis, COPD. She is an ex-cigarette smoker and has not had a cigarette since March and we congratulate her. She is seen for evaluation. Current Outpatient Medications   Medication Sig Dispense Refill    DULoxetine (CYMBALTA) 30 mg capsule Take 1 Cap by mouth daily. 90 Cap 5    celecoxib (CELEBREX) 200 mg capsule TAKE 1 CAPSULE BY MOUTH  TWICE A DAY AS NEEDED 120 Cap 5    levothyroxine (SYNTHROID) 25 mcg tablet TAKE 1 TABLET BY MOUTH ONCE DAILY BEFORE BREAKFAST 90 Tab 0    simvastatin (ZOCOR) 40 mg tablet TAKE 1 TABLET BY MOUTH  NIGHTLY 90 Tab 3    amLODIPine (NORVASC) 5 mg tablet TAKE 1 TABLET BY MOUTH ONCE DAILY 90 Tab 3    acetaminophen (TYLENOL ARTHRITIS PAIN) 650 mg TbER Take 650 mg by mouth as needed for Pain.        Past Medical History:   Diagnosis Date    Arthritis     Chronically on opiate therapy     COPD (chronic obstructive pulmonary disease) ([facility] [address]     Elevated cholesterol     Epicondylitis elbow, medial, right     Ex-cigarette smoker     ct 6/25/13  stopped feb 2020    H/O total hip arthroplasty, right 02/07/2020    tejas aviles md    High cholesterol     Hip pain, chronic, right 2020    thr- rick aviles md    History of total hip arthroplasty, left 2018    Hypertension     Hypothyroid     IGT (impaired glucose tolerance) 2014    Neuropathy     Onychomycosis     S/P colonoscopy 13    cecal lipoma    S/P colonoscopy 2013    hemorrhoids,diverticulosis    Tubulovillous adenoma of colon      Past Surgical History:   Procedure Laterality Date    HX BREAST BIOPSY Left     BENIGN    HX BUNIONECTOMY Left     HX BUNIONECTOMY Right 2011    HX COLONOSCOPY      HX DILATION AND CURETTAGE  `S    HX GI      COLONOSCOPY    HX HEENT  2017    ORAL SURGERY TO REMOVE UPPER  TEETH     HX ORTHOPAEDIC Left 1980`S     BUNIONECTOMY     HX ORTHOPAEDIC Right 2015    BUNIONECTOMY    HX POLYPECTOMY      TOTAL HIP ARTHROPLASTY Left 2018     Allergies   Allergen Reactions    Bee Venom Protein (Honey Bee) Swelling    Flu Vac 2018 [id number](Pf) Other (comments)     LOCK JAW    Lisinopril Hives and Swelling       REVIEW OF SYSTEMS:   No chest pain, no shortness of breath. Social History     Socioeconomic History    Marital status:      Spouse name: Not on file    Number of children: Not on file    Years of education: Not on file    Highest education level: Not on file   Tobacco Use    Smoking status: Light Tobacco Smoker     Packs/day: 0.50     Years: 60.00     Pack years: 30.00    Smokeless tobacco: Never Used   Substance and Sexual Activity    Alcohol use: Not Currently     Comment: NONE IN PAST 5 YEARS    Drug use: No    Sexual activity: Not Currently   Social History Narrative    Medical History: Polypectomy-tubulovillous adenoma ? may 13 2013 chest CT negative    Gyn History: Last mammogram date 2013. Last menstrual perioddate . OB History: Total pregnancies 0. Surgical History: Denies Past Surgical History    Hospitalization/Major Diagnostic Procedure: syncope     Family History:  Mother:  91 yrs, heart diseaseFather:  62 yrs, MISister(s): aliveBrother(s): alive, 1 :    strokeGrandfather: alive2 brother(s) , 1 sister(s) . Social History: Alcohol Use Patient does not use alcohol. Smoking Status Patient is a  smoker, Quit in: 13 patient    fell off the leg start his cigarettes but assures me that his day she w ill never smoked another cigarette the rest of her life. Marital Status:    . Lives w ith: alone. Education/School: has completed 3 yrs college. 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   r  Family History   Problem Relation Age of Onset    Heart Disease Mother         PVD LEG AMPUTATION    Deep Vein Thrombosis Mother     Hypertension Mother     Alzheimer Mother     Hearing Impairment Father     Hypertension Father     Heart Failure Father     Stroke Father     Heart Attack Father     Diabetes Sister     Diabetes Brother     Heart Disease Brother         VALVULAR DISEASE    Anesth Problems Neg Hx        OBJECTIVE:  Visit Vitals  BP (!) 152/78   Pulse 61   Temp 97.9 °F (36.6 °C) (Oral)   Resp 16   Ht 5' 1\" (1.549 m)   Wt 146 lb 11.2 oz (66.5 kg)   SpO2 95%   BMI 27.72 kg/m²     ENT: perrla,  eom intact  NECK: supple. Thyroid normal  CHEST: clear to ascultation and percussion   HEART: regular rate and rhythm  ABD: soft, bowel sounds active  EXTREMITIES: no edema, pulse 1+     No visits with results within 3 Month(s) from this visit. Latest known visit with results is:   Admission on 2020, Discharged on 2020   Component Date Value Ref Range Status    Glucose (POC) 2020 92  65 - 100 mg/dL Final    Comment: (NOTE)  The Accu-Chek Inform II glucometer is not FDA cleared for critically   ill patient use. A study was performed validating the equivalence of   glucometer and clinical laboratory results on this patient   population.  Despite the study, use of glucometers with capillary   specimens from critically ill patients, regardless of their location,   makes the test high complexity and requires the performing individual   to comply with CLIA requirements more stringent than those for waived   testing in the hospital setting. Critical thinking skills are   necessary to determine a potentially critically ill patients status   prior to using a glucometer.  Performed by 02/05/2020 Valaria Scriver   Final    Sodium 02/06/2020 140  136 - 145 mmol/L Final    Potassium 02/06/2020 4.1  3.5 - 5.1 mmol/L Final    Chloride 02/06/2020 112* 97 - 108 mmol/L Final    CO2 02/06/2020 21  21 - 32 mmol/L Final    Anion gap 02/06/2020 7  5 - 15 mmol/L Final    Glucose 02/06/2020 127* 65 - 100 mg/dL Final    BUN 02/06/2020 20  6 - 20 MG/DL Final    Creatinine 02/06/2020 0.95  0.55 - 1.02 MG/DL Final    BUN/Creatinine ratio 02/06/2020 21* 12 - 20   Final    GFR est AA 02/06/2020 >60  >60 ml/min/1.73m2 Final    GFR est non-AA 02/06/2020 57* >60 ml/min/1.73m2 Final    Comment: Estimated GFR is calculated using the IDMS-traceable Modification of Diet in Renal Disease (MDRD) Study equation, reported for both  Americans (GFRAA) and non- Americans (GFRNA), and normalized to 1.73m2 body surface area. The physician must decide which value applies to the patient. The MDRD study equation should only be used in individuals age 25 or older. It has not been validated for the following: pregnant women, patients with serious comorbid conditions, or on certain medications, or persons with extremes of body size, muscle mass, or nutritional status.       Calcium 02/06/2020 8.3* 8.5 - 10.1 MG/DL Final    HGB 02/06/2020 8.4* 11.5 - 16.0 g/dL Final    Comment: INVESTIGATED PER DELTA CHECK PROTOCOL  RESULTS REPEATED      INR 02/06/2020 1.2* 0.9 - 1.1   Final    A single therapeutic range for Vit K antagonists may not be optimal for all indications - see June, 2008 issue of Chest, [employer] [facility] [employer] Evidence-Based Clinical Practice Guidelines, 8th Edition.  Prothrombin time 02/06/2020 11.9* 9.0 - 11.1 sec Final          ASSESSMENT:  1. Medicare annual wellness visit, subsequent    2. Screening for depression    3. Essential hypertension    4. Arthritis    5. Chronic obstructive pulmonary disease, unspecified COPD type ([facility] [address]    6. Prediabetes    7. Anemia, unspecified type    8. Ex-cigarette smoker    9. Neuropathy      Blood pressure control is lacking, although I think this represents white coat hypertension and therefore adjustment will not be made. Sister checks on her blood pressure periodically. Sister is an RN. COPD is stable and particularly since she stopped the cigarettes. We will check hemoglobin A1c for the status of her diabetes. She has a known history of anemia, which we will also check today. The neuropathic pain will be treated with Cymbalta. She will be back to see us in about three to four months, sooner if she has any problems. I have discussed the diagnosis with the patient and the intended plan as seen in the  orders above. The patient understands and agees with the plan. The patient has   received an after visit summary and questions were answered concerning  future plans  Patient labs and/or xrays were reviewed  Past records were reviewed. PLAN:  .  Orders Placed This Encounter    Depression Screen Annual    GREG MAMMO BI SCREENING INCL CAD    LIPID PANEL    URINALYSIS W/ RFLX MICROSCOPIC    CBC WITH AUTOMATED DIFF    METABOLIC PANEL, COMPREHENSIVE    TSH 3RD GENERATION    HEMOGLOBIN A1C WITH EAG    VITAMIN D, 25 HYDROXY    DISCONTD: DULoxetine (CYMBALTA) 30 mg capsule    DULoxetine (CYMBALTA) 30 mg capsule       Follow-up and Dispositions    · Return in about 4 months (around 2/5/2021). ATTENTION:   This medical record was transcribed using an electronic medical records system.   Although proofread, it may and can contain electronic and spelling errors. Other human spelling and other errors may be present. Corrections may be executed at a later time. Please feel free to contact us for any clarifications as needed.

## 2020-10-06 LAB
25(OH)D3+25(OH)D2 SERPL-MCNC: 17.9 NG/ML (ref 30–100)
ALBUMIN SERPL-MCNC: 4.6 G/DL (ref 3.7–4.7)
ALBUMIN/GLOB SERPL: 1.6 {RATIO} (ref 1.2–2.2)
ALP SERPL-CCNC: 78 IU/L (ref 39–117)
ALT SERPL-CCNC: 6 IU/L (ref 0–32)
APPEARANCE UR: ABNORMAL
AST SERPL-CCNC: 19 IU/L (ref 0–40)
BACTERIA #/AREA URNS HPF: ABNORMAL /[HPF]
BASOPHILS # BLD AUTO: 0 X10E3/UL (ref 0–0.2)
BASOPHILS NFR BLD AUTO: 1 %
BILIRUB SERPL-MCNC: 0.3 MG/DL (ref 0–1.2)
BILIRUB UR QL STRIP: NEGATIVE
BUN SERPL-MCNC: 25 MG/DL (ref 8–27)
BUN/CREAT SERPL: 25 (ref 12–28)
CALCIUM SERPL-MCNC: 10.2 MG/DL (ref 8.7–10.3)
CASTS URNS QL MICRO: ABNORMAL /LPF
CHLORIDE SERPL-SCNC: 102 MMOL/L (ref 96–106)
CHOLEST SERPL-MCNC: 138 MG/DL (ref 100–199)
CO2 SERPL-SCNC: 23 MMOL/L (ref 20–29)
COLOR UR: YELLOW
CREAT SERPL-MCNC: 1 MG/DL (ref 0.57–1)
EOSINOPHIL # BLD AUTO: 0.1 X10E3/UL (ref 0–0.4)
EOSINOPHIL NFR BLD AUTO: 2 %
EPI CELLS #/AREA URNS HPF: >10 /HPF (ref 0–10)
ERYTHROCYTE [DISTWIDTH] IN BLOOD BY AUTOMATED COUNT: 13.1 % (ref 11.7–15.4)
EST. AVERAGE GLUCOSE BLD GHB EST-MCNC: 114 MG/DL
GLOBULIN SER CALC-MCNC: 2.9 G/DL (ref 1.5–4.5)
GLUCOSE SERPL-MCNC: 86 MG/DL (ref 65–99)
GLUCOSE UR QL: NEGATIVE
HBA1C MFR BLD: 5.6 % (ref 4.8–5.6)
HCT VFR BLD AUTO: 38 % (ref 34–46.6)
HDLC SERPL-MCNC: 61 MG/DL
HGB BLD-MCNC: 12.4 G/DL (ref 11.1–15.9)
HGB UR QL STRIP: NEGATIVE
IMM GRANULOCYTES # BLD AUTO: 0 X10E3/UL (ref 0–0.1)
IMM GRANULOCYTES NFR BLD AUTO: 0 %
KETONES UR QL STRIP: ABNORMAL
LDLC SERPL CALC-MCNC: 63 MG/DL (ref 0–99)
LEUKOCYTE ESTERASE UR QL STRIP: NEGATIVE
LYMPHOCYTES # BLD AUTO: 1.2 X10E3/UL (ref 0.7–3.1)
LYMPHOCYTES NFR BLD AUTO: 30 %
MCH RBC QN AUTO: 28.9 PG (ref 26.6–33)
MCHC RBC AUTO-ENTMCNC: 32.6 G/DL (ref 31.5–35.7)
MCV RBC AUTO: 89 FL (ref 79–97)
MICRO URNS: ABNORMAL
MONOCYTES # BLD AUTO: 0.5 X10E3/UL (ref 0.1–0.9)
MONOCYTES NFR BLD AUTO: 13 %
MUCOUS THREADS URNS QL MICRO: PRESENT
NEUTROPHILS # BLD AUTO: 2.1 X10E3/UL (ref 1.4–7)
NEUTROPHILS NFR BLD AUTO: 54 %
NITRITE UR QL STRIP: NEGATIVE
PH UR STRIP: 5.5 [PH] (ref 5–7.5)
PLATELET # BLD AUTO: 215 X10E3/UL (ref 150–450)
POTASSIUM SERPL-SCNC: 4.7 MMOL/L (ref 3.5–5.2)
PROT SERPL-MCNC: 7.5 G/DL (ref 6–8.5)
PROT UR QL STRIP: ABNORMAL
RBC # BLD AUTO: 4.29 X10E6/UL (ref 3.77–5.28)
RBC #/AREA URNS HPF: ABNORMAL /HPF (ref 0–2)
SODIUM SERPL-SCNC: 142 MMOL/L (ref 134–144)
SP GR UR: >=1.03 (ref 1–1.03)
TRIGL SERPL-MCNC: 71 MG/DL (ref 0–149)
TSH SERPL DL<=0.005 MIU/L-ACNC: 1.97 UIU/ML (ref 0.45–4.5)
UROBILINOGEN UR STRIP-MCNC: 1 MG/DL (ref 0.2–1)
VLDLC SERPL CALC-MCNC: 14 MG/DL (ref 5–40)
WBC # BLD AUTO: 3.9 X10E3/UL (ref 3.4–10.8)
WBC #/AREA URNS HPF: ABNORMAL /HPF (ref 0–5)

## 2020-10-06 RX ORDER — MELATONIN
1000 DAILY
Qty: 90 TAB | Refills: 3 | Status: SHIPPED | OUTPATIENT
Start: 2020-10-06 | End: 2021-03-08 | Stop reason: SINTOL

## 2020-10-21 RX ORDER — LEVOTHYROXINE SODIUM 25 UG/1
TABLET ORAL
Qty: 90 TAB | Refills: 3 | Status: SHIPPED | OUTPATIENT
Start: 2020-10-21 | End: 2021-10-10

## 2021-03-08 ENCOUNTER — OFFICE VISIT (OUTPATIENT)
Dept: INTERNAL MEDICINE CLINIC | Age: 81
End: 2021-03-08
Payer: MEDICARE

## 2021-03-08 VITALS
WEIGHT: 146.4 LBS | HEART RATE: 68 BPM | TEMPERATURE: 98.5 F | OXYGEN SATURATION: 98 % | SYSTOLIC BLOOD PRESSURE: 136 MMHG | BODY MASS INDEX: 27.64 KG/M2 | RESPIRATION RATE: 18 BRPM | DIASTOLIC BLOOD PRESSURE: 74 MMHG | HEIGHT: 61 IN

## 2021-03-08 DIAGNOSIS — I10 ESSENTIAL HYPERTENSION: Primary | ICD-10-CM

## 2021-03-08 DIAGNOSIS — J44.9 CHRONIC OBSTRUCTIVE PULMONARY DISEASE, UNSPECIFIED COPD TYPE (HCC): ICD-10-CM

## 2021-03-08 DIAGNOSIS — G62.9 NEUROPATHY: ICD-10-CM

## 2021-03-08 DIAGNOSIS — Z87.891 EX-CIGARETTE SMOKER: ICD-10-CM

## 2021-03-08 DIAGNOSIS — M19.90 ARTHRITIS: ICD-10-CM

## 2021-03-08 PROCEDURE — 1101F PT FALLS ASSESS-DOCD LE1/YR: CPT | Performed by: INTERNAL MEDICINE

## 2021-03-08 PROCEDURE — G8399 PT W/DXA RESULTS DOCUMENT: HCPCS | Performed by: INTERNAL MEDICINE

## 2021-03-08 PROCEDURE — G8427 DOCREV CUR MEDS BY ELIG CLIN: HCPCS | Performed by: INTERNAL MEDICINE

## 2021-03-08 PROCEDURE — G8536 NO DOC ELDER MAL SCRN: HCPCS | Performed by: INTERNAL MEDICINE

## 2021-03-08 PROCEDURE — G8754 DIAS BP LESS 90: HCPCS | Performed by: INTERNAL MEDICINE

## 2021-03-08 PROCEDURE — 99214 OFFICE O/P EST MOD 30 MIN: CPT | Performed by: INTERNAL MEDICINE

## 2021-03-08 PROCEDURE — 1090F PRES/ABSN URINE INCON ASSESS: CPT | Performed by: INTERNAL MEDICINE

## 2021-03-08 PROCEDURE — G8419 CALC BMI OUT NRM PARAM NOF/U: HCPCS | Performed by: INTERNAL MEDICINE

## 2021-03-08 PROCEDURE — G8752 SYS BP LESS 140: HCPCS | Performed by: INTERNAL MEDICINE

## 2021-03-08 PROCEDURE — G8432 DEP SCR NOT DOC, RNG: HCPCS | Performed by: INTERNAL MEDICINE

## 2021-03-08 RX ORDER — MELATONIN
1000 DAILY
Qty: 90 TAB | Refills: 3
Start: 2021-03-08

## 2021-03-08 NOTE — PROGRESS NOTES
1. Have you been to the ER, urgent care clinic since your last visit? Hospitalized since your last visit? No    2. Have you seen or consulted any other health care providers outside of the [address] [place] [address] since your last visit? Include any pap smears or colon screening.  No

## 2021-03-08 NOTE — PROGRESS NOTES
SPORTS MEDICINE AND PRIMARY CARE  Max Stewart MD, [address] [address]  Phone:  [phone]  Fax: [phone]       Chief Complaint   Patient presents with    Hypertension   . SUBJECTIVE:    Keli Sage is a 80 y.o. female Patient returns today with a known history of primary hypertension, COPD, arthritis, neuropathy, history of cigarette addiction, and is seen for evaluation. Patient states she cannot take vitamin D, just made her feel out of it and she only took one tablet. Other new complaints denied and patient is seen for evaluation. Current Outpatient Medications   Medication Sig Dispense Refill    levothyroxine (SYNTHROID) 25 mcg tablet TAKE 1 TABLET BY MOUTH ONCE DAILY BEFORE BREAKFAST 90 Tab 3    celecoxib (CELEBREX) 200 mg capsule TAKE 1 CAPSULE BY MOUTH  TWICE A DAY AS NEEDED 120 Cap 5    simvastatin (ZOCOR) 40 mg tablet TAKE 1 TABLET BY MOUTH  NIGHTLY 90 Tab 3    amLODIPine (NORVASC) 5 mg tablet TAKE 1 TABLET BY MOUTH ONCE DAILY 90 Tab 3    acetaminophen (TYLENOL ARTHRITIS PAIN) 650 mg TbER Take 650 mg by mouth as needed for Pain.        Past Medical History:   Diagnosis Date    Arthritis     Chronically on opiate therapy     COPD (chronic obstructive pulmonary disease) ([facility] [address]     Elevated cholesterol     Epicondylitis elbow, medial, right     Ex-cigarette smoker     ct 6/25/13  stopped feb 2020    H/O total hip arthroplasty, right 02/07/2020    tejas aviles md    High cholesterol     Hip pain, chronic, right 02/07/2020    thr- rick aviles md    History of total hip arthroplasty, left 2018    Hypertension     Hypothyroid     IGT (impaired glucose tolerance) 11/13/2014    Neuropathy     Onychomycosis     S/P colonoscopy 12-12-13    cecal lipoma    S/P colonoscopy 12/12/2013    hemorrhoids,diverticulosis    Tubulovillous adenoma of colon 2008    Vitamin D deficiency      Past Surgical History:   Procedure Laterality Date    HX BREAST BIOPSY Left 1980    BENIGN   • HX BUNIONECTOMY Left 1978   • HX BUNIONECTOMY Right 2011   • HX COLONOSCOPY     • HX DILATION AND CURETTAGE  1980`S   • HX GI      COLONOSCOPY   • HX HEENT  2017    ORAL SURGERY TO REMOVE UPPER  TEETH    • HX ORTHOPAEDIC Left 1980`S     BUNIONECTOMY    • HX ORTHOPAEDIC Right 2015    BUNIONECTOMY   • HX POLYPECTOMY     • MI TOTAL HIP ARTHROPLASTY Left 2018     Allergies   Allergen Reactions   • Bee Venom Protein (Honey Bee) Swelling   • Flu Vac 2018 [id number](Pf) Other (comments)     LOCK JAW   • Lisinopril Hives and Swelling         REVIEW OF SYSTEMS:  General: negative for - chills or fever  ENT: negative for - headaches, nasal congestion or tinnitus  Respiratory: negative for - cough, hemoptysis, shortness of breath or wheezing  Cardiovascular : negative for - chest pain, edema, palpitations or shortness of breath  Gastrointestinal: negative for - abdominal pain, blood in stools, heartburn or nausea/vomiting  Genito-Urinary: no dysuria, trouble voiding, or hematuria  Musculoskeletal: negative for - gait disturbance, joint pain, joint stiffness or joint swelling  Neurological: no TIA or stroke symptoms  Hematologic: no bruises, no bleeding, no swollen glands  Integument: no lumps, mole changes, nail changes or rash  Endocrine: no malaise/lethargy or unexpected weight changes      Social History     Socioeconomic History   • Marital status:      Spouse name: Not on file   • Number of children: Not on file   • Years of education: Not on file   • Highest education level: Not on file   Tobacco Use   • Smoking status: Light Tobacco Smoker     Packs/day: 0.50     Years: 60.00     Pack years: 30.00   • Smokeless tobacco: Never Used   Substance and Sexual Activity   • Alcohol use: Not Currently     Comment: NONE IN PAST 5 YEARS   • Drug use: No   • Sexual activity: Not Currently   Social History Narrative    Medical History: Polypectomy-tubulovillous adenoma 2008?may 13   chest CT negative    Gyn History: Last mammogram date 2013. Last menstrual perioddate . OB History: Total pregnancies 0. Surgical History: Denies Past Surgical History    Hospitalization/Major Diagnostic Procedure: syncope         Family History: Mother:  80 yrs, heart diseaseFather:  62 yrs, MISister(s): aliveBrother(s): alive, 1 :    strokeGrandfather: alive2 brother(s) , 1 sister(s)     . Social History: Alcohol Use Patient does not use alcohol. Smoking Status Patient is a  smoker, Quit in: 13 patient    fell off the leg start his cigarettes but assures me that his day she w ill never smoked another cigarette the rest of her life. Marital Status:. Lives w ith: alone. Education/School: has completed 3 yrs college. 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 Family History   Problem Relation Age of Onset    Heart Disease Mother         PVD LEG AMPUTATION    Deep Vein Thrombosis Mother     Hypertension Mother     Alzheimer Mother     Hearing Impairment Father     Hypertension Father     Heart Failure Father     Stroke Father     Heart Attack Father     Diabetes Sister     Diabetes Brother     Heart Disease Brother         VALVULAR DISEASE    Anesth Problems Neg Hx        OBJECTIVE:    Visit Vitals  /74   Pulse 68   Temp 98.5 °F (36.9 °C) (Oral)   Resp 18   Ht 5' 1\" (1.549 m)   Wt 146 lb 6.4 oz (66.4 kg)   SpO2 98%   BMI 27.66 kg/m²     CONSTITUTIONAL: well , well nourished, appears age appropriate  EYES: perrla, eom intact  ENMT:moist mucous membranes, pharynx clear  NECK: supple.  Thyroid normal  RESPIRATORY: Chest: clear bilaterally   CARDIOVASCULAR: Heart: regular rate and rhythm  GASTROINTESTINAL: Abdomen: soft, bowel sounds active  HEMATOLOGIC: no pathological lymph nodes palpated  MUSCULOSKELETAL: Extremities: no edema, pulse 1+   INTEGUMENT: No unusual rashes or suspicious skin lesions noted. Nails appear normal.  NEUROLOGIC: non-focal exam   MENTAL STATUS: alert and oriented, appropriate affect           ASSESSMENT:  1. Essential hypertension    2. Chronic obstructive pulmonary disease, unspecified COPD type ([address]    3. Arthritis    4. Neuropathy    5. Ex-cigarette smoker      It was not the vitamin D, it was the Cymbalta that gave her the side effect and she is not taking that currently. Blood pressure control is adequate and at goal on Amlodipine. She has COPD that is stable. She does not have any more shortness of breath than baseline. Arthritis is controlled with Celebrex and we remind her to be mindful it can cause kidney issues, particularly as she has gotten older, and to take it very sparingly, which she claims she is doing. We give her the Cymbalta for neuropathy. She would rather deal with neuropathy than take Cymbalta or medication for neuropathy. She has not smoked a cigarette for over a year, which is just amazing. She is trying to get her sister to give it up for Amanda Chamberlain and she has been successful so far for two weeks. I think she is doing well in general. She will be back to see me in six months, sooner if she has any problems. I have discussed the diagnosis with the patient and the intended plan as seen in the  Orders. The patient understands and agees with the plan. The patient has   received an after visit summary and questions were answered concerning  future plans  Patient labs and/or xrays were reviewed  Past records were reviewed. PLAN:  . No orders of the defined types were placed in this encounter. Follow-up and Dispositions    · Return in about 6 months (around 9/8/2021). ATTENTION:   This medical record was transcribed using an electronic medical records system. Although proofread, it may and can contain electronic and spelling errors.   Other human spelling and other errors may be present. Corrections may be executed at a later time. Please feel free to contact us for any clarifications as needed.

## 2021-04-07 NOTE — PROGRESS NOTES
Date Department Ordering/Authorizing   3/2/2021 [facility], [place] CHAITANYA Marcial      Disp Refills Start End    Lisdexamfetamine Dimesylate (Vyvanse) 10 MG Cap 60 capsule 0 4/1/2021 5/1/2021    Sig - Route: Take 1 capsule by mouth 2 times daily. Do not start before April 1, 2021. -    Two prescriptions sent on 3/2/21.    Per PDMP, Vyvanse 10mg last sold 3/3/21 with prescription written 3/2/21.    Spoke to [facility]'s staff, Randal, who says prescription was closed out before it was filled.  Writer spoke to mother to notify new prescription to be sent.    Next appointment:  4/21/21  Last appointment:  1/29/21  Missed appointments:  None    Per ePDMP, last dispensed on  3/3/21  Is the patient due for refill of this medication(s):  Yes  PDMP review:  [x] Criteria MET. Rx pended and sent to provider for approval.  [] Criteria NOT MET.    Forwarded to provider for further review and decision on medication(s) requested.     Dr. Berry, Patient out of medication.        Problem: Self Care Deficits Care Plan (Adult)  Goal: *Acute Goals and Plan of Care (Insert Text)  Occupational Therapy Goals  Initiated: 7/12/2018, updated 7/16/18 (all continued)  1. Patient will perform grooming with supervision/set-up standing at sink within 3 day(s). 2.  Patient will perform bathing with supervision/set-up from chair within 3 day(s). 3.  Patient will perform upper body dressing and lower body dressing with supervision/set-up within 3 day(s). 4.  Patient will perform toilet transfers with supervision/set-up within 3 day(s). 5.  Patient will perform all aspects of toileting with supervision/set-up within 3 day(s). 1.  Patient will perform grooming with supervision/set-up standing at sink within 3 day(s). continue  2. Patient will perform bathing with supervision/set-up from chair within 3 day(s). continue  3. Patient will perform upper body dressing and lower body dressing with supervision/set-up within 3 day(s). continue  4. Patient will perform toilet transfers with supervision/set-up within 3 day(s). continue  5. Patient will perform all aspects of toileting with supervision/set-up within 3 day(s). continue     Occupational Therapy TREATMENT: WEEKLY REASSESSMENT  Patient: Eloisa Manrique (20 y.o. female)  Date: 7/16/2018  Diagnosis: DEGENERATIVE JOINT DISEASE LEFT HIP  Primary osteoarthritis of left hip Primary osteoarthritis of left hip  Procedure(s) (LRB):  LEFT TOTAL HIP REPLACEMENT (Left) 5 Days Post-Op  Precautions: Fall, PWB (50%)  Chart, occupational therapy assessment, plan of care, and goals were reviewed. ASSESSMENT:  Patient received in bed agreeable to therapy today. Patient required supervision for all bed mobility today to sit EOB and using gait belt to assist with LE management to side of bed.  Patient completed sit to stand transfer, therapist encouraging walk to toilet in bathroom but patient continually deferring stating she will use BSC, transferred with CGA and increased time and maintaining PWB well today. Patient supervision for toileting today in sitting/standing before transitioning to chair with CGA and RW for support. Patient continues to require assist for all ADL activity stating pain, patient will benefit from rehab to increase ADL participation, endurance, and functional transfers. Recommend with nursing patient to complete as able in order to maintain strength, endurance and independence: ADLs with supervision/setup, OOB to chair 3x/day and mobilizing to the [facility] for toileting with 1 assist. Thank you for your assistance. Progression toward goals:  [x]            Improving appropriately and progressing toward goals  []            Improving slowly and progressing toward goals  []            Not making progress toward goals and plan of care will be adjusted     PLAN:  Goals have been updated based on progression since last assessment. Patient continues to benefit from skilled intervention to address the above impairments. Continue to follow patient 5 times a week to address goals. Planned Interventions:  [x]                    Self Care Training                  [x]             Therapeutic Activities  []                    Functional Mobility Training    []             Cognitive Retraining  []                    Therapeutic Exercises           []             Endurance Activities  []                    Balance Training                   []             Neuromuscular Re-Education  []                    Visual/Perceptual Training     [x]        Home Safety Training  [x]                    Patient Education                 []             Family Training/Education  []                    Other (comment):  Discharge Recommendations: Rehab  Further Equipment Recommendations for Discharge: none     SUBJECTIVE:   Patient stated I need my lasso to get my foot out of bed.     OBJECTIVE DATA SUMMARY:   Cognitive/Behavioral Status:  Neurologic State: Alert  Orientation Level: Oriented X4  Cognition: Appropriate decision making             Functional Mobility and Transfers for ADLs:  Bed Mobility:  Supine to Sit: Supervision; Additional time (gait belt for LE)  Scooting: Supervision    Transfers:  Sit to Stand: Contact guard assistance  Functional Transfers  Toilet Transfer : Contact guard assistance        Balance:  Sitting: Intact  Standing: Intact  Standing - Static: Good  Standing - Dynamic : Fair         Toileting  Toileting Assistance: Contact guard assistance         Neuro Re-Education:           Therapeutic Exercises:     Pain:  Pain Scale 1: Numeric (0 - 10)  Pain Intensity 1: 0  Pain Location 1: Hip  Pain Orientation 1: Left     Pain Intervention(s) 1: Medication (see MAR)  Activity Tolerance:   VSS  Please refer to the flowsheet for vital signs taken during this treatment.   After treatment:   [x] Patient left in no apparent distress sitting up in chair  [] Patient left in no apparent distress in bed  [x] Call bell left within reach  [x] Nursing notified  [] Caregiver present  [] Bed alarm activated    COMMUNICATION/COLLABORATION:   The patients plan of care was discussed with: Registered Nurse    Ebony Parker  Time Calculation: 20 mins

## 2021-04-13 RX ORDER — SIMVASTATIN 40 MG/1
TABLET, FILM COATED ORAL
Qty: 90 TAB | Refills: 3 | Status: SHIPPED | OUTPATIENT
Start: 2021-04-13 | End: 2022-04-27

## 2021-06-29 RX ORDER — AMLODIPINE BESYLATE 5 MG/1
TABLET ORAL
Qty: 90 TABLET | Refills: 3 | Status: SHIPPED | OUTPATIENT
Start: 2021-06-29 | End: 2022-08-03

## 2021-09-13 ENCOUNTER — OFFICE VISIT (OUTPATIENT)
Dept: INTERNAL MEDICINE CLINIC | Age: 81
End: 2021-09-13
Payer: MEDICARE

## 2021-09-13 VITALS
WEIGHT: 150.2 LBS | BODY MASS INDEX: 28.36 KG/M2 | OXYGEN SATURATION: 97 % | HEART RATE: 70 BPM | DIASTOLIC BLOOD PRESSURE: 70 MMHG | RESPIRATION RATE: 18 BRPM | HEIGHT: 61 IN | TEMPERATURE: 98.1 F | SYSTOLIC BLOOD PRESSURE: 128 MMHG

## 2021-09-13 DIAGNOSIS — D64.9 ANEMIA, UNSPECIFIED TYPE: ICD-10-CM

## 2021-09-13 DIAGNOSIS — Z12.31 ENCOUNTER FOR SCREENING MAMMOGRAM FOR MALIGNANT NEOPLASM OF BREAST: ICD-10-CM

## 2021-09-13 DIAGNOSIS — J44.9 CHRONIC OBSTRUCTIVE PULMONARY DISEASE, UNSPECIFIED COPD TYPE (HCC): Primary | ICD-10-CM

## 2021-09-13 DIAGNOSIS — Z87.891 EX-CIGARETTE SMOKER: ICD-10-CM

## 2021-09-13 DIAGNOSIS — E55.9 VITAMIN D DEFICIENCY, UNSPECIFIED: ICD-10-CM

## 2021-09-13 DIAGNOSIS — J84.9 ILD (INTERSTITIAL LUNG DISEASE) (HCC): ICD-10-CM

## 2021-09-13 DIAGNOSIS — I10 ESSENTIAL HYPERTENSION: ICD-10-CM

## 2021-09-13 DIAGNOSIS — M19.90 ARTHRITIS: ICD-10-CM

## 2021-09-13 DIAGNOSIS — G62.9 NEUROPATHY: ICD-10-CM

## 2021-09-13 DIAGNOSIS — R73.02 IGT (IMPAIRED GLUCOSE TOLERANCE): ICD-10-CM

## 2021-09-13 PROCEDURE — 1101F PT FALLS ASSESS-DOCD LE1/YR: CPT | Performed by: INTERNAL MEDICINE

## 2021-09-13 PROCEDURE — 1090F PRES/ABSN URINE INCON ASSESS: CPT | Performed by: INTERNAL MEDICINE

## 2021-09-13 PROCEDURE — G8754 DIAS BP LESS 90: HCPCS | Performed by: INTERNAL MEDICINE

## 2021-09-13 PROCEDURE — G8399 PT W/DXA RESULTS DOCUMENT: HCPCS | Performed by: INTERNAL MEDICINE

## 2021-09-13 PROCEDURE — G8536 NO DOC ELDER MAL SCRN: HCPCS | Performed by: INTERNAL MEDICINE

## 2021-09-13 PROCEDURE — G8419 CALC BMI OUT NRM PARAM NOF/U: HCPCS | Performed by: INTERNAL MEDICINE

## 2021-09-13 PROCEDURE — G8753 SYS BP > OR = 140: HCPCS | Performed by: INTERNAL MEDICINE

## 2021-09-13 PROCEDURE — 99214 OFFICE O/P EST MOD 30 MIN: CPT | Performed by: INTERNAL MEDICINE

## 2021-09-13 PROCEDURE — G8427 DOCREV CUR MEDS BY ELIG CLIN: HCPCS | Performed by: INTERNAL MEDICINE

## 2021-09-13 PROCEDURE — G8510 SCR DEP NEG, NO PLAN REQD: HCPCS | Performed by: INTERNAL MEDICINE

## 2021-09-13 NOTE — PROGRESS NOTES
1. Have you been to the ER, urgent care clinic since your last visit? Hospitalized since your last visit? No    2. Have you seen or consulted any other health care providers outside of the [address] [place] [address] since your last visit? Include any pap smears or colon screening.  No      Wants to discuss ear issues

## 2021-09-13 NOTE — PROGRESS NOTES
SPORTS MEDICINE AND PRIMARY CARE  Tiffany Driscoll MD, [address] [address]  Phone:  [phone]  Fax: [phone]       Chief Complaint   Patient presents with    Hypertension   . SUBJECTIVE:    Gracy Hale is a 80 y.o. female Patient with a history of COPD, interstitial lung disease, history of cigarette abuse, neuropathy, primary hypertension, anemia, impaired glucose tolerance, arthritis and is seen for evaluation. Patient still has not gone back to cigarettes. She states she started working on puzzles and doing things with her hands to keep herself occupied. She is trying to get her sister to stop, but she just will not stop. She did slow down, however, she states. Current Outpatient Medications   Medication Sig Dispense Refill    amLODIPine (NORVASC) 5 mg tablet TAKE 1 TABLET BY MOUTH ONCE DAILY 90 Tablet 3    simvastatin (ZOCOR) 40 mg tablet TAKE 1 TABLET BY MOUTH AT  NIGHT 90 Tab 3    cholecalciferol (VITAMIN D3) (1000 Units /25 mcg) tablet Take 1 Tab by mouth daily. 90 Tab 3    levothyroxine (SYNTHROID) 25 mcg tablet TAKE 1 TABLET BY MOUTH ONCE DAILY BEFORE BREAKFAST 90 Tab 3    celecoxib (CELEBREX) 200 mg capsule TAKE 1 CAPSULE BY MOUTH  TWICE A DAY AS NEEDED 120 Cap 5    acetaminophen (TYLENOL ARTHRITIS PAIN) 650 mg TbER Take 650 mg by mouth as needed for Pain.        Past Medical History:   Diagnosis Date    Arthritis     Chronically on opiate therapy     COPD (chronic obstructive pulmonary disease) ([facility] [address]     Elevated cholesterol     Epicondylitis elbow, medial, right     Ex-cigarette smoker     ct 6/25/13  stopped feb 2020    H/O total hip arthroplasty, right 02/07/2020    tejas aviles md    High cholesterol     Hip pain, chronic, right 02/07/2020    thr- rick aviles md    History of total hip arthroplasty, left 2018    Hypertension     Hypothyroid     IGT (impaired glucose tolerance) 11/13/2014    ILD (interstitial lung disease) ([facility]     Neuropathy     Onychomycosis     S/P colonoscopy 12-12-13    cecal lipoma    S/P colonoscopy 12/12/2013    hemorrhoids,diverticulosis    Tubulovillous adenoma of colon 2008    Vitamin D deficiency      Past Surgical History:   Procedure Laterality Date    HX BREAST BIOPSY Left 1980    BENIGN    HX BUNIONECTOMY Left 1978    HX BUNIONECTOMY Right 2011    HX COLONOSCOPY      HX DILATION AND CURETTAGE  1980`S    HX GI      COLONOSCOPY    HX HEENT  2017    ORAL SURGERY TO REMOVE UPPER  TEETH     HX ORTHOPAEDIC Left 1980`S     BUNIONECTOMY     HX ORTHOPAEDIC Right 2015    BUNIONECTOMY    HX POLYPECTOMY      IN TOTAL HIP ARTHROPLASTY Left 2018     Allergies   Allergen Reactions    Bee Venom Protein (Honey Bee) Swelling    Flu Vac 2018 [id number](Pf) Other (comments)     LOCK JAW    Lisinopril Hives and Swelling         REVIEW OF SYSTEMS:  General: negative for - chills or fever  ENT: negative for - headaches, nasal congestion or tinnitus  Respiratory: negative for - cough, hemoptysis, shortness of breath or wheezing  Cardiovascular : negative for - chest pain, edema, palpitations or shortness of breath  Gastrointestinal: negative for - abdominal pain, blood in stools, heartburn or nausea/vomiting  Genito-Urinary: no dysuria, trouble voiding, or hematuria  Musculoskeletal: negative for - gait disturbance, joint pain, joint stiffness or joint swelling  Neurological: no TIA or stroke symptoms  Hematologic: no bruises, no bleeding, no swollen glands  Integument: no lumps, mole changes, nail changes or rash  Endocrine: no malaise/lethargy or unexpected weight changes      Social History     Socioeconomic History    Marital status:      Spouse name: Not on file    Number of children: Not on file    Years of education: Not on file    Highest education level: Not on file   Tobacco Use    Smoking status: Light Tobacco Smoker     Packs/day: 0.50     Years: 60.00     Pack years: 30.00    Smokeless tobacco: Never Used   Vaping Use    Vaping Use: Never used   Substance and Sexual Activity    Alcohol use: Not Currently     Comment: NONE IN PAST 5 YEARS    Drug use: No    Sexual activity: Not Currently   Social History Narrative    Medical History: Polypectomy-tubulovillous adenoma ? may 13 2013 chest CT negative    Gyn History: Last mammogram date 2013. Last menstrual perioddate . OB History: Total pregnancies 0. Surgical History: Denies Past Surgical History    Hospitalization/Major Diagnostic Procedure: syncope         Family History: Mother:  80 yrs, heart diseaseFather:  62 yrs, MISister(s): aliveBrother(s): alive, 1 :    strokeGrandfather: alive2 brother(s) , 1 sister(s)     . Social History: Alcohol Use Patient does not use alcohol. Smoking Status Patient is a  smoker, Quit in: 13 patient    fell off the leg start his cigarettes but assures me that his day she w ill never smoked another cigarette the rest of her life. Marital Status:. Lives w ith: alone. Education/School: has completed 3 yrs college. 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 Social Determinants of Health     Financial Resource Strain:     Difficulty of Paying Living Expenses:    Food Insecurity:     Worried About Running Out of Food in the Last Year:     [address] in the Last Year:    Transportation Needs:     Lack of Transportation (Medical):      Lack of Transportation (Non-Medical):    Physical Activity:     Days of Exercise per Week:     Minutes of Exercise per Session:    Stress:     Feeling of Stress :    Social Connections:     Frequency of Communication with Friends and Family:     Frequency of Social Gatherings with Friends and Family:     Attends Spiritism Services:     Active Member of Clubs or Organizations:     Attends Club or Organization Meetings:     Marital Status:      Family History   Problem Relation Age of Onset    Heart Disease Mother         PVD LEG AMPUTATION    Deep Vein Thrombosis Mother     Hypertension Mother     Alzheimer Mother     Hearing Impairment Father     Hypertension Father     Heart Failure Father     Stroke Father     Heart Attack Father     Diabetes Sister     Diabetes Brother     Heart Disease Brother         VALVULAR DISEASE    Anesth Problems Neg Hx        OBJECTIVE:    Visit Vitals  BP (!) 148/74   Pulse 70   Temp 98.1 °F (36.7 °C) (Oral)   Resp 18   Ht 5' 1\" (1.549 m)   Wt 150 lb 3.2 oz (68.1 kg)   SpO2 97%   BMI 28.38 kg/m²     CONSTITUTIONAL: well , well nourished, appears age appropriate  EYES: perrla, eom intact  ENMT:moist mucous membranes, pharynx clear  NECK: supple. Thyroid normal  RESPIRATORY: Chest: clear bilaterally   CARDIOVASCULAR: Heart: regular rate and rhythm  GASTROINTESTINAL: Abdomen: soft, bowel sounds active  HEMATOLOGIC: no pathological lymph nodes palpated  MUSCULOSKELETAL: Extremities: no edema, pulse 1+   INTEGUMENT: No unusual rashes or suspicious skin lesions noted. Nails appear normal.  NEUROLOGIC: non-focal exam   MENTAL STATUS: alert and oriented, appropriate affect           ASSESSMENT:  1. Chronic obstructive pulmonary disease, unspecified COPD type ([address]    2. ILD (interstitial lung disease) ([address]    3. Ex-cigarette smoker    4. Neuropathy    5. Essential hypertension    6. Anemia, unspecified type    7. Arthritis    8. IGT (impaired glucose tolerance)    9. Encounter for screening mammogram for malignant neoplasm of breast     10. Vitamin D deficiency, unspecified       Patient is doing well. She has COPD and last CT scan raised the question of mild interstitial lung disease. We will repeat the CT scan before the year is out. We congratulate her. She still remains an ex cigarette smoker for the past year now. No symptoms related to neuropathy.     When she came in, her blood pressure was 148/74. It has dropped down to 128/70 and we congratulate her. There is a history of anemia and we will check her blood count today to confirm. She has arthritis and has had a hip replacement and is doing well with that hip. She has a history of impaired glucose tolerance and we will check hemoglobin A1c. She will be back to see us in six months, sooner if she has any problems. She is reminded she can walk in to see us any time should she have an issue and unable to get an appointment. I have discussed the diagnosis with the patient and the intended plan as seen in the  Orders. The patient understands and agees with the plan. The patient has   received an after visit summary and questions were answered concerning  future plans  Patient labs and/or xrays were reviewed  Past records were reviewed. PLAN:  .  Orders Placed This Encounter    GREG MAMMO BI SCREENING INCL CAD    CT CHEST WO CONT    URINALYSIS W/ RFLX MICROSCOPIC    CBC WITH AUTOMATED DIFF    METABOLIC PANEL, COMPREHENSIVE    LIPID PANEL    TSH 3RD GENERATION    HEMOGLOBIN A1C WITH EAG    VITAMIN D, 25 HYDROXY       Follow-up and Dispositions    · Return in about 6 months (around 3/13/2022). ATTENTION:   This medical record was transcribed using an electronic medical records system. Although proofread, it may and can contain electronic and spelling errors. Other human spelling and other errors may be present. Corrections may be executed at a later time. Please feel free to contact us for any clarifications as needed.

## 2021-09-14 LAB — BILIRUB UR QL CFM: NEGATIVE

## 2021-09-15 LAB
25(OH)D3 SERPL-MCNC: 61.9 NG/ML (ref 30–100)
ALBUMIN SERPL-MCNC: 3.7 G/DL (ref 3.5–5)
ALBUMIN/GLOB SERPL: 0.9 {RATIO} (ref 1.1–2.2)
ALP SERPL-CCNC: 57 U/L (ref 45–117)
ALT SERPL-CCNC: 12 U/L (ref 12–78)
ANION GAP SERPL CALC-SCNC: 3 MMOL/L (ref 5–15)
APPEARANCE UR: ABNORMAL
AST SERPL-CCNC: 20 U/L (ref 15–37)
BACTERIA URNS QL MICRO: ABNORMAL /HPF
BASOPHILS # BLD: 0 K/UL (ref 0–0.1)
BASOPHILS NFR BLD: 1 % (ref 0–1)
BILIRUB SERPL-MCNC: 0.3 MG/DL (ref 0.2–1)
BUN SERPL-MCNC: 19 MG/DL (ref 6–20)
BUN/CREAT SERPL: 17 (ref 12–20)
CALCIUM SERPL-MCNC: 10 MG/DL (ref 8.5–10.1)
CHLORIDE SERPL-SCNC: 111 MMOL/L (ref 97–108)
CHOLEST SERPL-MCNC: 132 MG/DL
CO2 SERPL-SCNC: 29 MMOL/L (ref 21–32)
COLOR UR: ABNORMAL
CREAT SERPL-MCNC: 1.15 MG/DL (ref 0.55–1.02)
DIFFERENTIAL METHOD BLD: ABNORMAL
EOSINOPHIL # BLD: 0.1 K/UL (ref 0–0.4)
EOSINOPHIL NFR BLD: 1 % (ref 0–7)
EPITH CASTS URNS QL MICRO: ABNORMAL /LPF
ERYTHROCYTE [DISTWIDTH] IN BLOOD BY AUTOMATED COUNT: 13.1 % (ref 11.5–14.5)
EST. AVERAGE GLUCOSE BLD GHB EST-MCNC: 114 MG/DL
GLOBULIN SER CALC-MCNC: 3.9 G/DL (ref 2–4)
GLUCOSE SERPL-MCNC: 106 MG/DL (ref 65–100)
GLUCOSE UR STRIP.AUTO-MCNC: NEGATIVE MG/DL
HBA1C MFR BLD: 5.6 % (ref 4–5.6)
HCT VFR BLD AUTO: 38.5 % (ref 35–47)
HDLC SERPL-MCNC: 54 MG/DL
HDLC SERPL: 2.4 {RATIO} (ref 0–5)
HGB BLD-MCNC: 11.5 G/DL (ref 11.5–16)
HGB UR QL STRIP: NEGATIVE
IMM GRANULOCYTES # BLD AUTO: 0 K/UL (ref 0–0.04)
IMM GRANULOCYTES NFR BLD AUTO: 0 % (ref 0–0.5)
KETONES UR QL STRIP.AUTO: ABNORMAL MG/DL
LDLC SERPL CALC-MCNC: 54.2 MG/DL (ref 0–100)
LEUKOCYTE ESTERASE UR QL STRIP.AUTO: ABNORMAL
LYMPHOCYTES # BLD: 1.1 K/UL (ref 0.8–3.5)
LYMPHOCYTES NFR BLD: 26 % (ref 12–49)
MCH RBC QN AUTO: 29.2 PG (ref 26–34)
MCHC RBC AUTO-ENTMCNC: 29.9 G/DL (ref 30–36.5)
MCV RBC AUTO: 97.7 FL (ref 80–99)
MONOCYTES # BLD: 0.4 K/UL (ref 0–1)
MONOCYTES NFR BLD: 9 % (ref 5–13)
MUCOUS THREADS URNS QL MICRO: ABNORMAL /LPF
NEUTS SEG # BLD: 2.7 K/UL (ref 1.8–8)
NEUTS SEG NFR BLD: 63 % (ref 32–75)
NITRITE UR QL STRIP.AUTO: NEGATIVE
NRBC # BLD: 0 K/UL (ref 0–0.01)
NRBC BLD-RTO: 0 PER 100 WBC
PH UR STRIP: 5 [PH] (ref 5–8)
PLATELET # BLD AUTO: 218 K/UL (ref 150–400)
PMV BLD AUTO: 11.4 FL (ref 8.9–12.9)
POTASSIUM SERPL-SCNC: 4.2 MMOL/L (ref 3.5–5.1)
PROT SERPL-MCNC: 7.6 G/DL (ref 6.4–8.2)
PROT UR STRIP-MCNC: 30 MG/DL
RBC # BLD AUTO: 3.94 M/UL (ref 3.8–5.2)
RBC #/AREA URNS HPF: ABNORMAL /HPF (ref 0–5)
SODIUM SERPL-SCNC: 143 MMOL/L (ref 136–145)
SP GR UR REFRACTOMETRY: >1.03
TRIGL SERPL-MCNC: 119 MG/DL (ref ?–150)
TSH SERPL DL<=0.05 MIU/L-ACNC: 1.23 UIU/ML (ref 0.36–3.74)
UROBILINOGEN UR QL STRIP.AUTO: 1 EU/DL (ref 0.2–1)
VLDLC SERPL CALC-MCNC: 23.8 MG/DL
WBC # BLD AUTO: 4.2 K/UL (ref 3.6–11)
WBC URNS QL MICRO: ABNORMAL /HPF (ref 0–4)

## 2021-09-27 ENCOUNTER — HOSPITAL ENCOUNTER (OUTPATIENT)
Dept: MAMMOGRAPHY | Age: 81
Discharge: HOME | End: 2021-09-27
Attending: INTERNAL MEDICINE
Payer: MEDICARE

## 2021-09-27 ENCOUNTER — HOSPITAL ENCOUNTER (OUTPATIENT)
Dept: CT IMAGING | Age: 81
Discharge: HOME | End: 2021-09-27
Attending: INTERNAL MEDICINE
Payer: MEDICARE

## 2021-09-27 DIAGNOSIS — J84.9 ILD (INTERSTITIAL LUNG DISEASE) (HCC): Primary | ICD-10-CM

## 2021-09-27 DIAGNOSIS — I10 ESSENTIAL HYPERTENSION: ICD-10-CM

## 2021-09-27 DIAGNOSIS — J84.9 ILD (INTERSTITIAL LUNG DISEASE) (HCC): ICD-10-CM

## 2021-09-27 DIAGNOSIS — J44.9 CHRONIC OBSTRUCTIVE PULMONARY DISEASE, UNSPECIFIED COPD TYPE (HCC): ICD-10-CM

## 2021-09-27 DIAGNOSIS — Z12.31 ENCOUNTER FOR SCREENING MAMMOGRAM FOR MALIGNANT NEOPLASM OF BREAST: ICD-10-CM

## 2021-09-27 PROCEDURE — 77067 SCR MAMMO BI INCL CAD: CPT

## 2021-09-27 PROCEDURE — 71250 CT THORAX DX C-: CPT

## 2021-10-10 RX ORDER — CELECOXIB 200 MG/1
CAPSULE ORAL
Qty: 180 CAPSULE | Refills: 3 | Status: SHIPPED | OUTPATIENT
Start: 2021-10-10 | End: 2022-07-18

## 2021-10-10 RX ORDER — LEVOTHYROXINE SODIUM 25 UG/1
TABLET ORAL
Qty: 90 TABLET | Refills: 3 | Status: SHIPPED | OUTPATIENT
Start: 2021-10-10 | End: 2022-08-05

## 2022-03-14 ENCOUNTER — OFFICE VISIT (OUTPATIENT)
Dept: INTERNAL MEDICINE CLINIC | Age: 82
End: 2022-03-14
Payer: MEDICARE

## 2022-03-14 VITALS
BODY MASS INDEX: 27.17 KG/M2 | SYSTOLIC BLOOD PRESSURE: 122 MMHG | WEIGHT: 143.9 LBS | RESPIRATION RATE: 16 BRPM | DIASTOLIC BLOOD PRESSURE: 71 MMHG | TEMPERATURE: 98.2 F | HEIGHT: 61 IN | HEART RATE: 73 BPM | OXYGEN SATURATION: 100 %

## 2022-03-14 DIAGNOSIS — Z87.891 EX-CIGARETTE SMOKER: ICD-10-CM

## 2022-03-14 DIAGNOSIS — M19.90 ARTHRITIS: ICD-10-CM

## 2022-03-14 DIAGNOSIS — J44.9 CHRONIC OBSTRUCTIVE PULMONARY DISEASE, UNSPECIFIED COPD TYPE (HCC): ICD-10-CM

## 2022-03-14 DIAGNOSIS — Z13.39 SCREENING FOR ALCOHOLISM: ICD-10-CM

## 2022-03-14 DIAGNOSIS — I10 PRIMARY HYPERTENSION: ICD-10-CM

## 2022-03-14 DIAGNOSIS — R73.02 IGT (IMPAIRED GLUCOSE TOLERANCE): ICD-10-CM

## 2022-03-14 DIAGNOSIS — Z00.00 MEDICARE ANNUAL WELLNESS VISIT, SUBSEQUENT: Primary | ICD-10-CM

## 2022-03-14 PROCEDURE — G8432 DEP SCR NOT DOC, RNG: HCPCS | Performed by: INTERNAL MEDICINE

## 2022-03-14 PROCEDURE — 99213 OFFICE O/P EST LOW 20 MIN: CPT | Performed by: INTERNAL MEDICINE

## 2022-03-14 PROCEDURE — G0439 PPPS, SUBSEQ VISIT: HCPCS | Performed by: INTERNAL MEDICINE

## 2022-03-14 PROCEDURE — G8752 SYS BP LESS 140: HCPCS | Performed by: INTERNAL MEDICINE

## 2022-03-14 PROCEDURE — 1101F PT FALLS ASSESS-DOCD LE1/YR: CPT | Performed by: INTERNAL MEDICINE

## 2022-03-14 PROCEDURE — G8399 PT W/DXA RESULTS DOCUMENT: HCPCS | Performed by: INTERNAL MEDICINE

## 2022-03-14 PROCEDURE — G8754 DIAS BP LESS 90: HCPCS | Performed by: INTERNAL MEDICINE

## 2022-03-14 PROCEDURE — G8419 CALC BMI OUT NRM PARAM NOF/U: HCPCS | Performed by: INTERNAL MEDICINE

## 2022-03-14 PROCEDURE — G8536 NO DOC ELDER MAL SCRN: HCPCS | Performed by: INTERNAL MEDICINE

## 2022-03-14 PROCEDURE — G8427 DOCREV CUR MEDS BY ELIG CLIN: HCPCS | Performed by: INTERNAL MEDICINE

## 2022-03-14 PROCEDURE — 1090F PRES/ABSN URINE INCON ASSESS: CPT | Performed by: INTERNAL MEDICINE

## 2022-03-14 NOTE — PROGRESS NOTES
SPORTS MEDICINE AND PRIMARY CARE  Ginette Thacker MD, [address] [address]  Phone:  [phone]  Fax: [phone]      Chief Complaint   Patient presents with   [facility] Wellness Visit     Patient is here for a wellness visit. SUBECTIVE:    Rosalind Davenport is a 80 y.o. female Patient returns today with a known history of primary hypertension, impaired glucose tolerance, COPD, arthritis, and ex-cigarette smoker and is seen for evaluation. Patient has a letter from [facility], who offered her remote patient monitoring service. \"This service allows us to keep a close eye on your health and identify potential problems sooner to help reduce the risk of hospitalization. The monitoring service uses small sensors to monitor your breathing, pulse rate and steps. Using these Metrics, we are able to track your breathing patterns and provide care at the earliest sign of issues. \"  Patient denies specific complaints. She is seen for evaluation. Current Outpatient Medications   Medication Sig Dispense Refill    levothyroxine (SYNTHROID) 25 mcg tablet TAKE 1 TABLET BY MOUTH ONCE DAILY BEFORE BREAKFAST 90 Tablet 3    amLODIPine (NORVASC) 5 mg tablet TAKE 1 TABLET BY MOUTH ONCE DAILY 90 Tablet 3    simvastatin (ZOCOR) 40 mg tablet TAKE 1 TABLET BY MOUTH AT  NIGHT 90 Tab 3    cholecalciferol (VITAMIN D3) (1000 Units /25 mcg) tablet Take 1 Tab by mouth daily. 90 Tab 3    celecoxib (CELEBREX) 200 mg capsule TAKE 1 CAPSULE BY MOUTH  TWICE A DAY AS NEEDED 180 Capsule 3    acetaminophen (TYLENOL ARTHRITIS PAIN) 650 mg TbER Take 650 mg by mouth as needed for Pain.        Past Medical History:   Diagnosis Date    Arthritis     Chronically on opiate therapy     COPD (chronic obstructive pulmonary disease) ([facility] [address]     Elevated cholesterol     Epicondylitis elbow, medial, right     Ex-cigarette smoker     ct 6/25/13  stopped feb 2020    H/O total hip arthroplasty, right 02/07/2020    tejas aviles md    High cholesterol     Hip pain, chronic, right 02/07/2020    thr- rick aviles md    History of total hip arthroplasty, left 2018    Hypertension     Hypothyroid     IGT (impaired glucose tolerance) 11/13/2014    ILD (interstitial lung disease) ([facility] [address]     thermoactinomyces Sacchari    Neuropathy     Onychomycosis     S/P colonoscopy 12-12-13    cecal lipoma    S/P colonoscopy 12/12/2013    hemorrhoids,diverticulosis    Tubulovillous adenoma of colon 2008    Vitamin D deficiency      Past Surgical History:   Procedure Laterality Date    HX BREAST BIOPSY Left 1980    BENIGN    HX BUNIONECTOMY Left 1978    HX BUNIONECTOMY Right 2011    HX COLONOSCOPY      HX DILATION AND CURETTAGE  1980`S    HX GI      COLONOSCOPY    HX HEENT  2017    ORAL SURGERY TO REMOVE UPPER  TEETH     HX ORTHOPAEDIC Left 1980`S     BUNIONECTOMY     HX ORTHOPAEDIC Right 2015    BUNIONECTOMY    HX POLYPECTOMY      [place] TOTAL HIP ARTHROPLASTY Left 2018     Allergies   Allergen Reactions    Bee Venom Protein (Honey Bee) Swelling    Flu Vac 2018 [id number](Pf) Other (comments)     LOCK JAW    Lisinopril Hives and Swelling       REVIEW OF SYSTEMS:   No chest pain, no shortness of breath. Social History     Socioeconomic History    Marital status:    Tobacco Use    Smoking status: Light Tobacco Smoker     Packs/day: 0.50     Years: 60.00     Pack years: 30.00    Smokeless tobacco: Never Used   Vaping Use    Vaping Use: Never used   Substance and Sexual Activity    Alcohol use: Not Currently     Comment: NONE IN PAST 5 YEARS    Drug use: No    Sexual activity: Not Currently   Social History Narrative    Medical History: Polypectomy-tubulovillous adenoma 2008? may 13 2013 chest CT negative    Gyn History: Last mammogram date 05/01/2013. Last menstrual perioddate 1990. OB History: Total pregnancies 0.     Surgical History: Denies Past Surgical History Hospitalization/Major Diagnostic Procedure: syncope         Family History: Mother:  80 yrs, heart diseaseFather:  62 yrs, MISister(s): aliveBrother(s): alive, 1 :    strokeGrandfather: alive2 brother(s) , 1 sister(s)     . Social History: Alcohol Use Patient does not use alcohol. Smoking Status Patient is a  smoker, Quit in: 13 patient    fell off the leg start his cigarettes but assures me that his day she w ill never smoked another cigarette the rest of her life. Marital Status:. Lives w ith: alone. Education/School: has completed 3 yrs college. 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   r  Family History   Problem Relation Age of Onset    Heart Disease Mother         PVD LEG AMPUTATION    Deep Vein Thrombosis Mother     Hypertension Mother     Alzheimer's Disease Mother     Hearing Impairment Father     Hypertension Father     Heart Failure Father     Stroke Father     Heart Attack Father     Diabetes Sister     Diabetes Brother     Heart Disease Brother         VALVULAR DISEASE    Anesth Problems Neg Hx        OBJECTIVE:  Visit Vitals  /71   Pulse 73   Temp 98.2 °F (36.8 °C)   Resp 16   Ht 5' 1\" (1.549 m)   Wt 143 lb 14.4 oz (65.3 kg)   SpO2 100%   BMI 27.19 kg/m²     ENT: perrla,  eom intact  NECK: supple. Thyroid normal  CHEST: clear to ascultation and percussion   HEART: regular rate and rhythm  ABD: soft, bowel sounds active  EXTREMITIES: no edema, pulse 1+     No visits with results within 3 Month(s) from this visit.    Latest known visit with results is:   Clinical Support on 10/12/2021   Component Date Value Ref Range Status    RNP Abs 10/12/2021 0.8  0.0 - 0.9 AI Final    Comment: (NOTE)  Performed At: [address] [id number]  Jossie López MD UE:[id number]      Sjogren's Anti-SS-A 10/12/2021 <0.2  0.0 - 0.9 AI Final    Sjogren's Anti-SS-B 10/12/2021 <0.2  0.0 - 0.9 AI Final    Comment: (NOTE)  Performed At: [facility] [place] [address] [place] [id number]  Tiffanie Elias MD OS:[id number]      [id number]-02 Ab 10/12/2021 <0.2  0.0 - 0.9 AI Final    Comment: (NOTE)  Performed At: [facility] [place] [address] [place] [id number]  Tiffanie Elias MD UZ:[id number]      Anti-Isaura-1 10/12/2021 <0.2  0.0 - 0.9 AI Final    Comment: (NOTE)  Performed At: [facility] & [address], [address] [place] [id number]  Tiffanie Elias MD KR:[id number]      A. fumigatus #1 Ab 10/12/2021 Negative  Negative   Final    Micropoly. faeni Abs 10/12/2021 Negative  Negative   Final    T. vulgaris #1 Ab 10/12/2021 Negative  Negative   Final    A. pullulans Abs 10/12/2021 Negative  Negative   Final    T. saccharii Ab 10/12/2021 Positive* Negative   Final    [place] Serum Abs 10/12/2021 Negative  Negative   Final    Comment: (NOTE)  Performed At: [facility] [address], [address] [place] [id number]  Tiffanie Elias MD PO:[id number]      Myeloperoxidase (MPO) Ab 10/12/2021 <9.0  0.0 - 9.0 U/mL Final    Proteinase 3 (LA-3) Ab 10/12/2021 <3.5  0.0 - 3.5 U/mL Final    Comment: (NOTE)  Performed At: [facility] [address], [address] [place] [id number]  Tiffanie Elias MD QI:[id number]      Cytoplasmic (C-ANCA) Ab 10/12/2021 <1:20  Neg:<1:20 titer Final    Perinuclear (P-ANCA) 10/12/2021 <1:20  Neg:<1:20 titer Final    Comment: (NOTE)  The presence of positive fluorescence exhibiting P-ANCA or C-ANCA  patterns alone is not specific for the diagnosis of Wegener's  Granulomatosis (WG) or microscopic polyangiitis. Decisions about  treatment should not be based solely on ANCA IFA results. The  International ANCA Group Consensus recommends follow up testing of  positive sera with both LA-3 and MPO-ANCA enzyme immunoassays. As  many as 5% serum samples are positive only by EIA. Ref.  AM J Clin Pathol 1999;111:507-513.  Atypical pANCA 10/12/2021 1:320* Neg:<1:20 titer Final    Comment: (NOTE)  The atypical pANCA pattern has been observed in a significant  percentage of patients with ulcerative colitis, primary sclerosing  cholangitis and autoimmune hepatitis.   Performed At: [facility] & [address] [id number]  Jossie López MD YM:[id number]     Elnoraabhilash Rojas, Direct 10/12/2021 Negative  Negative   Final    Comment: (NOTE)  Performed At: [facility] & [address] [id number]  Jossie López MD GA:[id number]      Rheumatoid factor 10/12/2021 <10  <15 IU/mL Final    CCP Antibodies IgG/IgA 10/12/2021 10  0 - 19 units Final    Comment: (NOTE)                           Negative               <20                           Weak positive      20 - 39                           Moderate positive  40 - 59                           Strong positive        >59  Performed At: [facility] & [address] [id number]  Jossie López MD BD:[id number]      Aldolase 10/12/2021 4.3  3.3 - 10.3 U/L Final    Comment: (NOTE)  Performed At: [facility] & [address] [id number]  Jossie López MD AT:[id number]      Angiotensin Converting Enzyme (ACE) 10/12/2021 26  14 - 82 U/L Final    Comment: (NOTE)  Performed At: [facility] & [address] [id number]  Jossie López MD WV:[id number]      LD 10/12/2021 172  81 - 246 U/L Final    RNP Abs 10/12/2021 0.7  0.0 - 0.9 AI Final    Acevedo Abs 10/12/2021 <0.2  0.0 - 0.9 AI Final    Comment: (NOTE)  Performed At: [facility] & [address] [id number]  Jossie López MD XF:[id number]      Centromere B Ab 10/12/2021 <0.2  0.0 - 0.9 AI Final    Comment: (NOTE)  Performed At: [facility]  [address] [id number]  Jossie López MD KS:[id number]       10/12/2021 285* 26 - 192 U/L Final    Immunoglobulin G, Qt. 10/12/2021 1,151  586 - 1,602 mg/dL Final    IgG, Subclass 1 10/12/2021 805  248 - 810 mg/dL Final    IgG, Subclass 2 10/12/2021 278  130 - 555 mg/dL Final    IgG, Subclass 3 10/12/2021 134* 15 - 102 mg/dL Final    IgG, Subclass 4 10/12/2021 26  2 - 96 mg/dL Final    Comment: (NOTE)  Performed At: [address] [id number]  Clyde Palma MD LN:[id number]      C-Reactive protein 10/12/2021 0.45  0.00 - 0.60 mg/dL Final    Comment: CRP is a nonspecific acute phase reactant that shows rapid, marked increases  with inflammation, infection, trauma, tissue necrosis, malignancies and  autoimmune diseases. Sequential CRP levels are useful in monitoring response to  antibacterial therapy. This assay is not equivalent to the hsCRP test since the  presence of one or more of the foregoing disease processes obviates the risk  stratification information available from hsCRP testing.  Sed rate, automated 10/12/2021 64* 0 - 30 mm/hr Final    Sodium 10/12/2021 141  136 - 145 mmol/L Final    Potassium 10/12/2021 3.7  3.5 - 5.1 mmol/L Final    Chloride 10/12/2021 109* 97 - 108 mmol/L Final    CO2 10/12/2021 26  21 - 32 mmol/L Final    Anion gap 10/12/2021 6  5 - 15 mmol/L Final    Glucose 10/12/2021 121* 65 - 100 mg/dL Final    BUN 10/12/2021 20  6 - 20 MG/DL Final    Creatinine 10/12/2021 1.11* 0.55 - 1.02 MG/DL Final    BUN/Creatinine ratio 10/12/2021 18  12 - 20   Final    GFR est AA 10/12/2021 57* >60 ml/min/1.73m2 Final    GFR est non-AA 10/12/2021 47* >60 ml/min/1.73m2 Final    Comment: Estimated GFR is calculated using the IDMS-traceable Modification of Diet in  Renal Disease (MDRD) Study equation, reported for both  Americans  (GFRAA) and non- Americans (GFRNA), and normalized to 1.73m2 body  surface area. The physician must decide which value applies to the patient.       Calcium 10/12/2021 9.6  8.5 - 10.1 MG/DL Final    Bilirubin, total 10/12/2021 0.4  0.2 - 1.0 MG/DL Final    ALT (SGPT) 10/12/2021 12  12 - 78 U/L Final    AST (SGOT) 10/12/2021 19  15 - 37 U/L Final    Alk. phosphatase 10/12/2021 56  45 - 117 U/L Final    Protein, total 10/12/2021 7.6  6.4 - 8.2 g/dL Final    Albumin 10/12/2021 3.6  3.5 - 5.0 g/dL Final    Globulin 10/12/2021 4.0  2.0 - 4.0 g/dL Final    A-G Ratio 10/12/2021 0.9* 1.1 - 2.2   Final    WBC 10/12/2021 4.2  3.6 - 11.0 K/uL Final    RBC 10/12/2021 3.87  3.80 - 5.20 M/uL Final    HGB 10/12/2021 11.4* 11.5 - 16.0 g/dL Final    HCT 10/12/2021 37.0  35.0 - 47.0 % Final    MCV 10/12/2021 95.6  80.0 - 99.0 FL Final    MCH 10/12/2021 29.5  26.0 - 34.0 PG Final    MCHC 10/12/2021 30.8  30.0 - 36.5 g/dL Final    RDW 10/12/2021 13.2  11.5 - 14.5 % Final    PLATELET 40/70/7624 771  150 - 400 K/uL Final    MPV 10/12/2021 11.5  8.9 - 12.9 FL Final    NRBC 10/12/2021 0.0  0  WBC Final    ABSOLUTE NRBC 10/12/2021 0.00  0.00 - 0.01 K/uL Final    NEUTROPHILS 10/12/2021 68  32 - 75 % Final    LYMPHOCYTES 10/12/2021 18  12 - 49 % Final    MONOCYTES 10/12/2021 11  5 - 13 % Final    EOSINOPHILS 10/12/2021 2  0 - 7 % Final    BASOPHILS 10/12/2021 1  0 - 1 % Final    IMMATURE GRANULOCYTES 10/12/2021 0  0.0 - 0.5 % Final    ABS. NEUTROPHILS 10/12/2021 2.8  1.8 - 8.0 K/UL Final    ABS. LYMPHOCYTES 10/12/2021 0.8  0.8 - 3.5 K/UL Final    ABS. MONOCYTES 10/12/2021 0.5  0.0 - 1.0 K/UL Final    ABS. EOSINOPHILS 10/12/2021 0.1  0.0 - 0.4 K/UL Final    ABS. BASOPHILS 10/12/2021 0.0  0.0 - 0.1 K/UL Final    ABS. IMM. GRANS. 10/12/2021 0.0  0.00 - 0.04 K/UL Final    DF 10/12/2021 SMEAR SCANNED    Final    PLATELET COMMENTS 16/12/6857 Large Platelets    Final    PRESENT    RBC COMMENTS 10/12/2021     Final                    Value:MACROCYTOSIS  1+            ASSESSMENT:  1. Medicare annual wellness visit, subsequent    2. Screening for alcoholism    3. Primary hypertension    4.  IGT (impaired glucose tolerance) 5. Chronic obstructive pulmonary disease, unspecified COPD type ([facility] [address]    6. Arthritis    7. Ex-cigarette smoker      Patient is doing well. Blood pressure control is at goal.      She has impaired glucose tolerance, which has been stable. She has COPD, apparently followed by [facility], although she is currently asymptomatic. She does not have complaints related to arthritis. She states that now that she has stopped smoking, she is trying to get her sister to stop smoking. She will be back to see me in six months, sooner if she needs to. I have discussed the diagnosis with the patient and the intended plan as seen in the  orders above. The patient understands and agees with the plan. The patient has   received an after visit summary and questions were answered concerning  future plans  Patient labs and/or xrays were reviewed  Past records were reviewed. PLAN:  . No orders of the defined types were placed in this encounter. Follow-up and Dispositions    · Return in about 6 months (around 9/14/2022). ATTENTION:   This medical record was transcribed using an electronic medical records system. Although proofread, it may and can contain electronic and spelling errors. Other human spelling and other errors may be present. Corrections may be executed at a later time. Please feel free to contact us for any clarifications as needed.

## 2022-03-14 NOTE — PATIENT INSTRUCTIONS

## 2022-03-14 NOTE — PROGRESS NOTES
This is the Subsequent Medicare Annual Wellness Exam, performed 12 months or more after the Initial AWV or the last Subsequent AWV    I have reviewed the patient's medical history in detail and updated the computerized patient record. Assessment/Plan   Education and counseling provided:  Are appropriate based on today's review and evaluation         Depression Risk Factor Screening     3 most recent PHQ Screens 3/14/2022   Little interest or pleasure in doing things Not at all   Feeling down, depressed, irritable, or hopeless Not at all   Total Score PHQ 2 0       Alcohol & Drug Abuse Risk Screen    Do you average more than 1 drink per night or more than 7 drinks a week:  No    On any one occasion in the past three months have you have had more than 3 drinks containing alcohol:  No          Functional Ability and Level of Safety    Hearing: Hearing is good. Activities of Daily Living: The home contains: no safety equipment. Patient does total self care      Ambulation: with no difficulty     Fall Risk:  Fall Risk Assessment, last 12 mths 3/14/2022   Able to walk? Yes   Fall in past 12 months? 0   Do you feel unsteady? 0   Are you worried about falling 0   Number of falls in past 12 months -   Fall with injury?  -      Abuse Screen:  Patient is not abused       Cognitive Screening    Has your family/caregiver stated any concerns about your memory: no     Cognitive Screening: Normal - Verbal Fluency Test    Health Maintenance Due     Health Maintenance Due   Topic Date Due    Shingrix Vaccine Age 49> (1 of 2) Never done    COVID-19 Vaccine (3 - Booster for Edmondson Peter series) 07/25/2021    Medicare Yearly Exam  10/06/2021       Patient Care Team   Patient Care Team:  Keesha Foster MD as PCP - General (Internal Medicine)  Keesha Foster MD [facility]  Andrew Warren DPM (Podiatry)    History     Patient Active Problem List   Diagnosis Code    Hypertension I10    Arthritis M19.90    COPD [facility]chronic obstructive pulmonary disease) ([facility] J44.9    Left hip pain M25.552    Hip pain, left M25.552    Onychomycosis B35.1    Epicondylitis elbow, medial, right M77.01    IGT (impaired glucose tolerance) R73.02    Primary osteoarthritis of left hip M16.12    Anemia D64.9    Hip pain, chronic, right M25.551, G89.29    Primary localized osteoarthritis of right hip M16.11    Osteoarthritis of right hip M16.11    History of total hip arthroplasty, left V47.277    H/O total hip arthroplasty, right Z96.641    Ex-cigarette smoker Z87.891    Neuropathy G62.9    ILD (interstitial lung disease) ([facility]) J84.9     Past Medical History:   Diagnosis Date    Arthritis     Chronically on opiate therapy     COPD (chronic obstructive pulmonary disease) ([facility] [address]     Elevated cholesterol     Epicondylitis elbow, medial, right     Ex-cigarette smoker     ct 6/25/13  stopped feb 2020    H/O total hip arthroplasty, right 02/07/2020    tejas aviles md    High cholesterol     Hip pain, chronic, right 02/07/2020    thr- rick aviles md    History of total hip arthroplasty, left 2018    Hypertension     Hypothyroid     IGT (impaired glucose tolerance) 11/13/2014    ILD (interstitial lung disease) ([facility] [address]     thermoactinomyces Sacchari    Neuropathy     Onychomycosis     S/P colonoscopy 12-12-13    cecal lipoma    S/P colonoscopy 12/12/2013    hemorrhoids,diverticulosis    Tubulovillous adenoma of colon 2008    Vitamin D deficiency       Past Surgical History:   Procedure Laterality Date    HX BREAST BIOPSY Left 1980    BENIGN    HX BUNIONECTOMY Left 1978    HX BUNIONECTOMY Right 2011    HX COLONOSCOPY      HX DILATION AND CURETTAGE  1980`S    HX GI      COLONOSCOPY    HX HEENT  2017    ORAL SURGERY TO REMOVE UPPER  TEETH     HX ORTHOPAEDIC Left 1980`S     BUNIONECTOMY     HX ORTHOPAEDIC Right 2015    BUNIONECTOMY    HX POLYPECTOMY      [place] TOTAL HIP ARTHROPLASTY Left 2018 Current Outpatient Medications   Medication Sig Dispense Refill    levothyroxine (SYNTHROID) 25 mcg tablet TAKE 1 TABLET BY MOUTH ONCE DAILY BEFORE BREAKFAST 90 Tablet 3    amLODIPine (NORVASC) 5 mg tablet TAKE 1 TABLET BY MOUTH ONCE DAILY 90 Tablet 3    simvastatin (ZOCOR) 40 mg tablet TAKE 1 TABLET BY MOUTH AT  NIGHT 90 Tab 3    cholecalciferol (VITAMIN D3) (1000 Units /25 mcg) tablet Take 1 Tab by mouth daily. 90 Tab 3    celecoxib (CELEBREX) 200 mg capsule TAKE 1 CAPSULE BY MOUTH  TWICE A DAY AS NEEDED 180 Capsule 3    acetaminophen (TYLENOL ARTHRITIS PAIN) 650 mg TbER Take 650 mg by mouth as needed for Pain.        Allergies   Allergen Reactions    Bee Venom Protein (Honey Bee) Swelling    Flu Vac 2018 [id number](Pf) Other (comments)     LOCK JAW    Lisinopril Hives and Swelling       Family History   Problem Relation Age of Onset    Heart Disease Mother         PVD LEG AMPUTATION    Deep Vein Thrombosis Mother     Hypertension Mother     Alzheimer's Disease Mother     Hearing Impairment Father     Hypertension Father     Heart Failure Father     Stroke Father     Heart Attack Father     Diabetes Sister     Diabetes Brother     Heart Disease Brother         VALVULAR DISEASE    Anesth Problems Neg Hx      Social History     Tobacco Use    Smoking status: Light Tobacco Smoker     Packs/day: 0.50     Years: 60.00     Pack years: 30.00    Smokeless tobacco: Never Used   Substance Use Topics    Alcohol use: Not Currently     Comment: NONE IN PAST 5 YEARS         Luke Reyes

## 2022-03-18 PROBLEM — M16.12 PRIMARY OSTEOARTHRITIS OF LEFT HIP: Status: ACTIVE | Noted: 2018-07-06

## 2022-03-18 PROBLEM — Z96.641 H/O TOTAL HIP ARTHROPLASTY, RIGHT: Status: ACTIVE | Noted: 2020-02-07

## 2022-03-19 PROBLEM — Z96.642 HISTORY OF TOTAL HIP ARTHROPLASTY, LEFT: Status: ACTIVE | Noted: 2018-01-01

## 2022-03-19 PROBLEM — M16.11 OSTEOARTHRITIS OF RIGHT HIP: Status: ACTIVE | Noted: 2020-02-05

## 2022-03-19 PROBLEM — D64.9 ANEMIA: Status: ACTIVE | Noted: 2018-07-13

## 2022-03-19 PROBLEM — M16.11 PRIMARY LOCALIZED OSTEOARTHRITIS OF RIGHT HIP: Status: ACTIVE | Noted: 2020-01-31

## 2022-04-27 RX ORDER — SIMVASTATIN 40 MG/1
TABLET, FILM COATED ORAL
Qty: 90 TABLET | Refills: 3 | Status: SHIPPED | OUTPATIENT
Start: 2022-04-27

## 2022-07-18 ENCOUNTER — OFFICE VISIT (OUTPATIENT)
Dept: INTERNAL MEDICINE CLINIC | Age: 82
End: 2022-07-18
Payer: MEDICARE

## 2022-07-18 VITALS
DIASTOLIC BLOOD PRESSURE: 73 MMHG | WEIGHT: 145 LBS | TEMPERATURE: 99 F | SYSTOLIC BLOOD PRESSURE: 122 MMHG | HEIGHT: 61 IN | RESPIRATION RATE: 18 BRPM | OXYGEN SATURATION: 98 % | HEART RATE: 97 BPM | BODY MASS INDEX: 27.38 KG/M2

## 2022-07-18 DIAGNOSIS — R73.02 IGT (IMPAIRED GLUCOSE TOLERANCE): ICD-10-CM

## 2022-07-18 DIAGNOSIS — J44.9 CHRONIC OBSTRUCTIVE PULMONARY DISEASE, UNSPECIFIED COPD TYPE (HCC): ICD-10-CM

## 2022-07-18 DIAGNOSIS — J84.9 ILD (INTERSTITIAL LUNG DISEASE) (HCC): ICD-10-CM

## 2022-07-18 DIAGNOSIS — M19.90 ARTHRITIS: ICD-10-CM

## 2022-07-18 DIAGNOSIS — N18.31 STAGE 3A CHRONIC KIDNEY DISEASE (HCC): ICD-10-CM

## 2022-07-18 DIAGNOSIS — Z87.891 EX-CIGARETTE SMOKER: ICD-10-CM

## 2022-07-18 DIAGNOSIS — I10 PRIMARY HYPERTENSION: Primary | ICD-10-CM

## 2022-07-18 DIAGNOSIS — J98.6 DIAPHRAGM PARALYSIS: ICD-10-CM

## 2022-07-18 PROBLEM — N18.30 CHRONIC RENAL DISEASE, STAGE III (HCC): Status: ACTIVE | Noted: 2022-07-18

## 2022-07-18 LAB
ALBUMIN SERPL-MCNC: 4 G/DL (ref 3.5–5)
ANION GAP SERPL CALC-SCNC: 8 MMOL/L (ref 5–15)
BUN SERPL-MCNC: 21 MG/DL (ref 6–20)
BUN/CREAT SERPL: 17 (ref 12–20)
CALCIUM SERPL-MCNC: 10.2 MG/DL (ref 8.5–10.1)
CHLORIDE SERPL-SCNC: 109 MMOL/L (ref 97–108)
CO2 SERPL-SCNC: 26 MMOL/L (ref 21–32)
CREAT SERPL-MCNC: 1.22 MG/DL (ref 0.55–1.02)
EST. AVERAGE GLUCOSE BLD GHB EST-MCNC: 108 MG/DL
GLUCOSE SERPL-MCNC: 107 MG/DL (ref 65–100)
HBA1C MFR BLD: 5.4 % (ref 4–5.6)
PHOSPHATE SERPL-MCNC: 2.9 MG/DL (ref 2.6–4.7)
POTASSIUM SERPL-SCNC: 4.2 MMOL/L (ref 3.5–5.1)
SODIUM SERPL-SCNC: 143 MMOL/L (ref 136–145)

## 2022-07-18 PROCEDURE — G8427 DOCREV CUR MEDS BY ELIG CLIN: HCPCS | Performed by: INTERNAL MEDICINE

## 2022-07-18 PROCEDURE — 1090F PRES/ABSN URINE INCON ASSESS: CPT | Performed by: INTERNAL MEDICINE

## 2022-07-18 PROCEDURE — 99214 OFFICE O/P EST MOD 30 MIN: CPT | Performed by: INTERNAL MEDICINE

## 2022-07-18 PROCEDURE — G8419 CALC BMI OUT NRM PARAM NOF/U: HCPCS | Performed by: INTERNAL MEDICINE

## 2022-07-18 PROCEDURE — G8432 DEP SCR NOT DOC, RNG: HCPCS | Performed by: INTERNAL MEDICINE

## 2022-07-18 PROCEDURE — G8399 PT W/DXA RESULTS DOCUMENT: HCPCS | Performed by: INTERNAL MEDICINE

## 2022-07-18 PROCEDURE — G8536 NO DOC ELDER MAL SCRN: HCPCS | Performed by: INTERNAL MEDICINE

## 2022-07-18 PROCEDURE — 1101F PT FALLS ASSESS-DOCD LE1/YR: CPT | Performed by: INTERNAL MEDICINE

## 2022-07-18 PROCEDURE — G8752 SYS BP LESS 140: HCPCS | Performed by: INTERNAL MEDICINE

## 2022-07-18 PROCEDURE — 1123F ACP DISCUSS/DSCN MKR DOCD: CPT | Performed by: INTERNAL MEDICINE

## 2022-07-18 PROCEDURE — G8754 DIAS BP LESS 90: HCPCS | Performed by: INTERNAL MEDICINE

## 2022-07-18 NOTE — PROGRESS NOTES
SPORTS MEDICINE AND PRIMARY CARE  Juvenal Mayorga MD, [address] [address]  Phone:  [phone]  Fax: [phone]       Chief Complaint   Patient presents with    Hypertension   . SUBJECTIVE:    Hamlet Silva is a 80 y.o. 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 Takes her temperature every day and yesterday was 97-98. She returns today voicing no new complaints and is seen for evaluation. She brings in her immunization report from [facility] (formerly known as [facility]), which we use to update her immunization record here in the chart and she is up to date now. Current Outpatient Medications   Medication Sig Dispense Refill    simvastatin (ZOCOR) 40 mg tablet TAKE 1 TABLET BY MOUTH AT  NIGHT 90 Tablet 3    celecoxib (CELEBREX) 200 mg capsule TAKE 1 CAPSULE BY MOUTH  TWICE A DAY AS NEEDED 180 Capsule 3    levothyroxine (SYNTHROID) 25 mcg tablet TAKE 1 TABLET BY MOUTH ONCE DAILY BEFORE BREAKFAST 90 Tablet 3    amLODIPine (NORVASC) 5 mg tablet TAKE 1 TABLET BY MOUTH ONCE DAILY 90 Tablet 3    cholecalciferol (VITAMIN D3) (1000 Units /25 mcg) tablet Take 1 Tab by mouth daily. 90 Tab 3    acetaminophen (TYLENOL ARTHRITIS PAIN) 650 mg TbER Take 650 mg by mouth as needed for Pain.        Past Medical History:   Diagnosis Date    Arthritis     Chronically on opiate therapy     COPD (chronic obstructive pulmonary disease) ([facility] [address]     Diaphragm paralysis 09/27/2021    diaphragm paralysis r    Elevated cholesterol     Epicondylitis elbow, medial, right     Ex-cigarette smoker     ct 6/25/13  stopped feb 2020    H/O total hip arthroplasty, right 02/07/2020    tejas aviles md    High cholesterol     Hip pain, chronic, right 02/07/2020    thr- rick aviles md    History of total hip arthroplasty, left 2018    Hypertension     Hypothyroid     IGT (impaired glucose tolerance) 11/13/2014    ILD (interstitial lung disease) ([facility]     thermoactinomyces Sacchari    Neuropathy     Onychomycosis     S/P colonoscopy 12-12-13    cecal lipoma    S/P colonoscopy 12/12/2013    hemorrhoids,diverticulosis    Tubulovillous adenoma of colon 2008    Vitamin D deficiency      Past Surgical History:   Procedure Laterality Date    HX BREAST BIOPSY Left 1980    BENIGN    HX BUNIONECTOMY Left 1978    HX BUNIONECTOMY Right 2011    HX COLONOSCOPY      HX DILATION AND CURETTAGE  1980`S    HX GI      COLONOSCOPY    HX HEENT  2017    ORAL SURGERY TO REMOVE UPPER  TEETH     HX ORTHOPAEDIC Left 1980`S     BUNIONECTOMY     HX ORTHOPAEDIC Right 2015    BUNIONECTOMY    HX POLYPECTOMY      AR TOTAL HIP ARTHROPLASTY Left 2018     Allergies   Allergen Reactions    Bee Venom Protein (Honey Bee) Swelling    Flu Vac 2018 [id number](Pf) Other (comments)     LOCK JAW    Lisinopril Hives and Swelling         REVIEW OF SYSTEMS:  General: negative for - chills or fever  ENT: negative for - headaches, nasal congestion or tinnitus  Respiratory: negative for - cough, hemoptysis, shortness of breath or wheezing  Cardiovascular : negative for - chest pain, edema, palpitations or shortness of breath  Gastrointestinal: negative for - abdominal pain, blood in stools, heartburn or nausea/vomiting  Genito-Urinary: no dysuria, trouble voiding, or hematuria  Musculoskeletal: negative for - gait disturbance, joint pain, joint stiffness or joint swelling  Neurological: no TIA or stroke symptoms  Hematologic: no bruises, no bleeding, no swollen glands  Integument: no lumps, mole changes, nail changes or rash  Endocrine: no malaise/lethargy or unexpected weight changes      Social History     Socioeconomic History    Marital status:    Tobacco Use    Smoking status: Light Tobacco Smoker     Packs/day: 0.50     Years: 60.00     Pack years: 30.00    Smokeless tobacco: Never Used   Vaping Use    Vaping Use: Never used   Substance and Sexual Activity    Alcohol use: Not Currently     Comment: NONE IN PAST 5 YEARS    Drug use: No    Sexual activity: Not Currently   Social History Narrative    Medical History: Polypectomy-tubulovillous adenoma ? may 13 2013 chest CT negative    Gyn History: Last mammogram date 2013. Last menstrual perioddate . OB History: Total pregnancies 0. Surgical History: Denies Past Surgical History    Hospitalization/Major Diagnostic Procedure: syncope         Family History: Mother:  80 yrs, heart diseaseFather:  62 yrs, MISister(s): aliveBrother(s): alive, 1 :    strokeGrandfather: alive2 brother(s) , 1 sister(s)     . Social History: Alcohol Use Patient does not use alcohol. Smoking Status Patient is a  smoker, Quit in: 13 patient    fell off the leg start his cigarettes but assures me that his day she w ill never smoked another cigarette the rest of her life. Marital Status:. Lives w ith: alone. Education/School: has completed 3 yrs college. 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      Family History   Problem Relation Age of Onset    Heart Disease Mother         PVD LEG AMPUTATION    Deep Vein Thrombosis Mother     Hypertension Mother     Alzheimer's Disease Mother     Hearing Impairment Father     Hypertension Father     Heart Failure Father     Stroke Father     Heart Attack Father     Diabetes Sister     Diabetes Brother     Heart Disease Brother         VALVULAR DISEASE    Anesth Problems Neg Hx        OBJECTIVE:    Visit Vitals  /73 (BP 1 Location: Left upper arm, BP Patient Position: Sitting)   Pulse 97   Temp 99 °F (37.2 °C) (Oral)   Resp 18   Ht 5' 1\" (1.549 m)   Wt 145 lb (65.8 kg)   SpO2 98%   BMI 27.40 kg/m²     CONSTITUTIONAL: well , well nourished, appears age appropriate  EYES: perrla, eom intact  ENMT:moist mucous membranes, pharynx clear  NECK: supple. Thyroid normal  RESPIRATORY: Chest: clear bilaterally   CARDIOVASCULAR: Heart: regular rate and rhythm  GASTROINTESTINAL: Abdomen: soft, bowel sounds active  HEMATOLOGIC: no pathological lymph nodes palpated  MUSCULOSKELETAL: Extremities: no edema, pulse 1+   INTEGUMENT: No unusual rashes or suspicious skin lesions noted. Nails appear normal.  NEUROLOGIC: non-focal exam   MENTAL STATUS: alert and oriented, appropriate affect           ASSESSMENT:  1. Primary hypertension    2. Arthritis    3. Chronic obstructive pulmonary disease, unspecified COPD type ([facility] [address]    4. IGT (impaired glucose tolerance)    5. Ex-cigarette smoker    6. ILD (interstitial lung disease) ([facility] [address]    7. Stage 3a chronic kidney disease ([facility] [address]    8. Diaphragm paralysis      Blood pressure control is at goal, no titration is needed. She has arthritis, but will use the Tylenol as we advised her to stop Celebrex due to the chronic kidney disease. COPD is stable and she has interstitial lung disease, both of which are followed by Mahad Contreras MD, and we will repeat her CT scan in September. She has impaired glucose tolerance and we will check hemoglobin A1c today. She has not smoked for years now and we congratulate her. She has diaphragmatic paralysis, which is asymptomatic. She will be back to see me in six months, sooner if she has any problems. I have discussed the diagnosis with the patient and the intended plan as seen in the  Orders. The patient understands and agees with the plan. The patient has   received an after visit summary and questions were answered concerning  future plans  Patient labs and/or xrays were reviewed  Past records were reviewed.     PLAN:  .  Orders Placed This Encounter    CT CHEST WO CONT    RENAL FUNCTION PANEL    HEMOGLOBIN A1C WITH EAG                  ATTENTION:   This medical record was transcribed using an electronic medical records system. Although proofread, it may and can contain electronic and spelling errors. Other human spelling and other errors may be present. Corrections may be executed at a later time. Please feel free to contact us for any clarifications as needed.

## 2022-08-03 RX ORDER — AMLODIPINE BESYLATE 5 MG/1
TABLET ORAL
Qty: 90 TABLET | Refills: 3 | Status: SHIPPED | OUTPATIENT
Start: 2022-08-03

## 2022-08-05 RX ORDER — LEVOTHYROXINE SODIUM 25 UG/1
TABLET ORAL
Qty: 90 TABLET | Refills: 3 | Status: SHIPPED | OUTPATIENT
Start: 2022-08-05

## 2022-08-29 ENCOUNTER — HOSPITAL ENCOUNTER (OUTPATIENT)
Dept: CT IMAGING | Age: 82
Discharge: HOME | End: 2022-08-29
Attending: INTERNAL MEDICINE
Payer: MEDICARE

## 2022-08-29 DIAGNOSIS — J98.6 DIAPHRAGM PARALYSIS: ICD-10-CM

## 2022-08-29 DIAGNOSIS — R91.1 RIGHT UPPER LOBE PULMONARY NODULE: Primary | ICD-10-CM

## 2022-08-29 DIAGNOSIS — J84.9 ILD (INTERSTITIAL LUNG DISEASE) (HCC): ICD-10-CM

## 2022-08-29 DIAGNOSIS — J44.9 CHRONIC OBSTRUCTIVE PULMONARY DISEASE, UNSPECIFIED COPD TYPE (HCC): ICD-10-CM

## 2022-08-29 PROCEDURE — 71250 CT THORAX DX C-: CPT

## 2022-09-02 ENCOUNTER — TRANSCRIBE ORDER (OUTPATIENT)
Dept: SCHEDULING | Age: 82
End: 2022-09-02

## 2022-09-02 DIAGNOSIS — Z12.31 ENCOUNTER FOR MAMMOGRAM TO ESTABLISH BASELINE MAMMOGRAM: Primary | ICD-10-CM

## 2022-09-29 ENCOUNTER — HOSPITAL ENCOUNTER (OUTPATIENT)
Dept: MAMMOGRAPHY | Age: 82
Discharge: HOME | End: 2022-09-29
Attending: INTERNAL MEDICINE
Payer: MEDICARE

## 2022-09-29 DIAGNOSIS — Z12.31 ENCOUNTER FOR MAMMOGRAM TO ESTABLISH BASELINE MAMMOGRAM: ICD-10-CM

## 2022-09-29 PROCEDURE — 77067 SCR MAMMO BI INCL CAD: CPT

## 2022-10-24 ENCOUNTER — OFFICE VISIT (OUTPATIENT)
Dept: INTERNAL MEDICINE CLINIC | Age: 82
End: 2022-10-24
Payer: MEDICARE

## 2022-10-24 VITALS
HEIGHT: 61 IN | WEIGHT: 146.7 LBS | HEART RATE: 80 BPM | TEMPERATURE: 98.1 F | DIASTOLIC BLOOD PRESSURE: 74 MMHG | SYSTOLIC BLOOD PRESSURE: 119 MMHG | RESPIRATION RATE: 18 BRPM | BODY MASS INDEX: 27.7 KG/M2 | OXYGEN SATURATION: 97 %

## 2022-10-24 DIAGNOSIS — D64.9 ANEMIA, UNSPECIFIED TYPE: ICD-10-CM

## 2022-10-24 DIAGNOSIS — M19.90 ARTHRITIS: ICD-10-CM

## 2022-10-24 DIAGNOSIS — J44.9 CHRONIC OBSTRUCTIVE PULMONARY DISEASE, UNSPECIFIED COPD TYPE (HCC): ICD-10-CM

## 2022-10-24 DIAGNOSIS — R73.02 IGT (IMPAIRED GLUCOSE TOLERANCE): ICD-10-CM

## 2022-10-24 DIAGNOSIS — J84.9 ILD (INTERSTITIAL LUNG DISEASE) (HCC): ICD-10-CM

## 2022-10-24 DIAGNOSIS — I10 PRIMARY HYPERTENSION: Primary | ICD-10-CM

## 2022-10-24 DIAGNOSIS — N18.31 STAGE 3A CHRONIC KIDNEY DISEASE (HCC): ICD-10-CM

## 2022-10-24 PROCEDURE — G8399 PT W/DXA RESULTS DOCUMENT: HCPCS | Performed by: INTERNAL MEDICINE

## 2022-10-24 PROCEDURE — G8417 CALC BMI ABV UP PARAM F/U: HCPCS | Performed by: INTERNAL MEDICINE

## 2022-10-24 PROCEDURE — 99214 OFFICE O/P EST MOD 30 MIN: CPT | Performed by: INTERNAL MEDICINE

## 2022-10-24 PROCEDURE — G8536 NO DOC ELDER MAL SCRN: HCPCS | Performed by: INTERNAL MEDICINE

## 2022-10-24 PROCEDURE — 1123F ACP DISCUSS/DSCN MKR DOCD: CPT | Performed by: INTERNAL MEDICINE

## 2022-10-24 PROCEDURE — G8754 DIAS BP LESS 90: HCPCS | Performed by: INTERNAL MEDICINE

## 2022-10-24 PROCEDURE — G8752 SYS BP LESS 140: HCPCS | Performed by: INTERNAL MEDICINE

## 2022-10-24 PROCEDURE — 1101F PT FALLS ASSESS-DOCD LE1/YR: CPT | Performed by: INTERNAL MEDICINE

## 2022-10-24 PROCEDURE — G8432 DEP SCR NOT DOC, RNG: HCPCS | Performed by: INTERNAL MEDICINE

## 2022-10-24 PROCEDURE — G8427 DOCREV CUR MEDS BY ELIG CLIN: HCPCS | Performed by: INTERNAL MEDICINE

## 2022-10-24 PROCEDURE — 1090F PRES/ABSN URINE INCON ASSESS: CPT | Performed by: INTERNAL MEDICINE

## 2022-10-24 NOTE — PROGRESS NOTES
Manny Basurto is a 80 y.o. female    Chief Complaint   Patient presents with    Hypertension     1. Have you been to the ER, urgent care clinic since your last visit? Hospitalized since your last visit? No    2. Have you seen or consulted any other health care providers outside of the [address] [place] [address] since your last visit? Include any pap smears or colon screening.  No

## 2022-10-24 NOTE — PROGRESS NOTES
SPORTS MEDICINE AND PRIMARY CARE  Britni Herbert MD, [address] [address]  Phone:  [phone]  Fax: [phone]       Chief Complaint   Patient presents with    Hypertension   . SUBJECTIVE:    Cyrus Curry is a 80 y.o. female Patient returns today with a known history of hypertension, arthritis, COPD, impaired glucose tolerance, anemia, interstitial lung disease, CKD stage 3A and is seen for evaluation. Patient returns today voicing no new complaints and is seen for evaluation. Current Outpatient Medications   Medication Sig Dispense Refill    levothyroxine (SYNTHROID) 25 mcg tablet TAKE 1 TABLET BY MOUTH ONCE DAILY BEFORE BREAKFAST 90 Tablet 3    amLODIPine (NORVASC) 5 mg tablet TAKE 1 TABLET BY MOUTH ONCE DAILY 90 Tablet 3    simvastatin (ZOCOR) 40 mg tablet TAKE 1 TABLET BY MOUTH AT  NIGHT 90 Tablet 3    cholecalciferol (VITAMIN D3) (1000 Units /25 mcg) tablet Take 1 Tab by mouth daily. 90 Tab 3    acetaminophen (TYLENOL) 650 mg TbER Take 650 mg by mouth as needed for Pain.        Past Medical History:   Diagnosis Date    Arthritis     Chronically on opiate therapy     COPD (chronic obstructive pulmonary disease) ([facility] [address]     Diaphragm paralysis 09/27/2021    diaphragm paralysis r    Elevated cholesterol     Epicondylitis elbow, medial, right     Ex-cigarette smoker     ct 6/25/13  stopped feb 2020    H/O total hip arthroplasty, right 02/07/2020    tejas aviles md    High cholesterol     Hip pain, chronic, right 02/07/2020    thr- rick aviles md    History of total hip arthroplasty, left 2018    Hypertension     Hypothyroid     IGT (impaired glucose tolerance) 11/13/2014    ILD (interstitial lung disease) ([facility] [address]     thermoactinomyces Sacchari    Neuropathy     Onychomycosis     Right upper lobe pulmonary nodule 08/29/2022    S/P colonoscopy 12/12/2013    cecal lipoma    S/P colonoscopy 12/12/2013    hemorrhoids,diverticulosis    Tubulovillous adenoma of colon 2008 Vitamin D deficiency      Past Surgical History:   Procedure Laterality Date    HX BREAST BIOPSY Left 1980    BENIGN    HX BUNIONECTOMY Left 1978    HX BUNIONECTOMY Right 2011    HX COLONOSCOPY      HX DILATION AND CURETTAGE  1980`S    HX GI      COLONOSCOPY    HX HEENT  2017    ORAL SURGERY TO REMOVE UPPER  TEETH     HX ORTHOPAEDIC Left 1980`S     BUNIONECTOMY     HX ORTHOPAEDIC Right 2015    BUNIONECTOMY    HX POLYPECTOMY      FL TOTAL HIP ARTHROPLASTY Left 2018     Allergies   Allergen Reactions    Bee Venom Protein (Honey Bee) Swelling    Flu Vac 2018 [id number](Pf) Other (comments)     LOCK JAW    Lisinopril Hives and Swelling         REVIEW OF SYSTEMS:  General: negative for - chills or fever  ENT: negative for - headaches, nasal congestion or tinnitus  Respiratory: negative for - cough, hemoptysis, shortness of breath or wheezing  Cardiovascular : negative for - chest pain, edema, palpitations or shortness of breath  Gastrointestinal: negative for - abdominal pain, blood in stools, heartburn or nausea/vomiting  Genito-Urinary: no dysuria, trouble voiding, or hematuria  Musculoskeletal: negative for - gait disturbance, joint pain, joint stiffness or joint swelling  Neurological: no TIA or stroke symptoms  Hematologic: no bruises, no bleeding, no swollen glands  Integument: no lumps, mole changes, nail changes or rash  Endocrine: no malaise/lethargy or unexpected weight changes      Social History     Socioeconomic History    Marital status:    Tobacco Use    Smoking status: Light Smoker     Packs/day: 0.50     Years: 60.00     Pack years: 30.00     Types: Cigarettes    Smokeless tobacco: Never   Vaping Use    Vaping Use: Never used   Substance and Sexual Activity    Alcohol use: Not Currently     Comment: NONE IN PAST 5 YEARS    Drug use: No    Sexual activity: Not Currently   Social History Narrative    Medical History: Polypectomy-tubulovillous adenoma 2008? may 13 2013 chest CT negative    Gyn History: Last mammogram date 2013. Last menstrual perioddate . OB History: Total pregnancies 0. Surgical History: Denies Past Surgical History    Hospitalization/Major Diagnostic Procedure: syncope         Family History: Mother:  80 yrs, heart diseaseFather:  62 yrs, MISister(s): aliveBrother(s): alive, 1 :    strokeGrandfather: alive2 brother(s) , 1 sister(s)     . Social History: Alcohol Use Patient does not use alcohol. Smoking Status Patient is a  smoker, Quit in: 13 patient    fell off the leg start his cigarettes but assures me that his day she w ill never smoked another cigarette the rest of her life. Marital Status:. Lives w ith: alone. Education/School: has completed 3 yrs college. 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 Family History   Problem Relation Age of Onset    Heart Disease Mother         PVD LEG AMPUTATION    Deep Vein Thrombosis Mother     Hypertension Mother     Alzheimer's Disease Mother     Hearing Impairment Father     Hypertension Father     Heart Failure Father     Stroke Father     Heart Attack Father     Diabetes Sister     Diabetes Brother     Heart Disease Brother         VALVULAR DISEASE    Anesth Problems Neg Hx        OBJECTIVE:    Visit Vitals  /74 (BP 1 Location: Left upper arm, BP Patient Position: Sitting)   Pulse 80   Temp 98.1 °F (36.7 °C) (Oral)   Resp 18   Ht 5' 1\" (1.549 m)   Wt 146 lb 11.2 oz (66.5 kg)   SpO2 97%   BMI 27.72 kg/m²     CONSTITUTIONAL: well , well nourished, appears age appropriate  EYES: perrla, eom intact  ENMT:moist mucous membranes, pharynx clear  NECK: supple.  Thyroid normal  RESPIRATORY: Chest: clear bilaterally   CARDIOVASCULAR: Heart: regular rate and rhythm  GASTROINTESTINAL: Abdomen: soft, bowel sounds active  HEMATOLOGIC: no pathological lymph nodes palpated  MUSCULOSKELETAL: Extremities: no edema, pulse 1+   INTEGUMENT: No unusual rashes or suspicious skin lesions noted. Nails appear normal.  NEUROLOGIC: non-focal exam   MENTAL STATUS: alert and oriented, appropriate affect           ASSESSMENT:  1. Primary hypertension    2. Arthritis    3. Chronic obstructive pulmonary disease, unspecified COPD type ([facility] [address]    4. IGT (impaired glucose tolerance)    5. Anemia, unspecified type    6. ILD (interstitial lung disease) ([facility] [address]    7. Stage 3a chronic kidney disease ([facility] [address]      Blood pressure control is at goal.    Arthritic pains are asymptomatic. She has COPD, which is stable. She stopped cigarettes years ago now. History of impaired glucose tolerance and we will check hemoglobin A1c. We will check CBC for control of her anemia. She has interstitial lung disease, followed by [facility]. She has CKD stage 3A, for which we will check a renal panel. She will be back to see me in about four to six months, sooner if any problems. I have discussed the diagnosis with the patient and the intended plan as seen in the  Orders. The patient understands and agees with the plan. The patient has   received an after visit summary and questions were answered concerning  future plans  Patient labs and/or xrays were reviewed  Past records were reviewed. PLAN:  .  Orders Placed This Encounter    URINALYSIS W/ RFLX MICROSCOPIC    CBC WITH AUTOMATED DIFF    METABOLIC PANEL, COMPREHENSIVE    LIPID PANEL    HEMOGLOBIN A1C WITH EAG       Follow-up and Dispositions    Return in about 4 months (around 2/24/2023). ATTENTION:   This medical record was transcribed using an electronic medical records system. Although proofread, it may and can contain electronic and spelling errors. Other human spelling and other errors may be present. Corrections may be executed at a later time. Please feel free to contact us for any clarifications as needed.

## 2022-10-25 LAB
ALBUMIN SERPL-MCNC: 3.9 G/DL (ref 3.5–5)
ALBUMIN/GLOB SERPL: 1 {RATIO} (ref 1.1–2.2)
ALP SERPL-CCNC: 57 U/L (ref 45–117)
ALT SERPL-CCNC: 14 U/L (ref 12–78)
ANION GAP SERPL CALC-SCNC: 3 MMOL/L (ref 5–15)
APPEARANCE UR: ABNORMAL
AST SERPL-CCNC: 16 U/L (ref 15–37)
BACTERIA URNS QL MICRO: NEGATIVE /HPF
BASOPHILS # BLD: 0 K/UL (ref 0–0.1)
BASOPHILS NFR BLD: 1 % (ref 0–1)
BILIRUB SERPL-MCNC: 0.3 MG/DL (ref 0.2–1)
BILIRUB UR QL CFM: NEGATIVE
BUN SERPL-MCNC: 25 MG/DL (ref 6–20)
BUN/CREAT SERPL: 20 (ref 12–20)
CALCIUM SERPL-MCNC: 9.9 MG/DL (ref 8.5–10.1)
CHLORIDE SERPL-SCNC: 110 MMOL/L (ref 97–108)
CHOLEST SERPL-MCNC: 137 MG/DL
CO2 SERPL-SCNC: 28 MMOL/L (ref 21–32)
COLOR UR: ABNORMAL
CREAT SERPL-MCNC: 1.22 MG/DL (ref 0.55–1.02)
DIFFERENTIAL METHOD BLD: ABNORMAL
EOSINOPHIL # BLD: 0.1 K/UL (ref 0–0.4)
EOSINOPHIL NFR BLD: 1 % (ref 0–7)
EPITH CASTS URNS QL MICRO: ABNORMAL /LPF
ERYTHROCYTE [DISTWIDTH] IN BLOOD BY AUTOMATED COUNT: 13.2 % (ref 11.5–14.5)
EST. AVERAGE GLUCOSE BLD GHB EST-MCNC: 117 MG/DL
GLOBULIN SER CALC-MCNC: 3.9 G/DL (ref 2–4)
GLUCOSE SERPL-MCNC: 108 MG/DL (ref 65–100)
GLUCOSE UR STRIP.AUTO-MCNC: NEGATIVE MG/DL
HBA1C MFR BLD: 5.7 % (ref 4–5.6)
HCT VFR BLD AUTO: 37.9 % (ref 35–47)
HDLC SERPL-MCNC: 56 MG/DL
HDLC SERPL: 2.4 {RATIO} (ref 0–5)
HGB BLD-MCNC: 11.4 G/DL (ref 11.5–16)
HGB UR QL STRIP: NEGATIVE
IMM GRANULOCYTES # BLD AUTO: 0 K/UL (ref 0–0.04)
IMM GRANULOCYTES NFR BLD AUTO: 0 % (ref 0–0.5)
KETONES UR QL STRIP.AUTO: 15 MG/DL
LDLC SERPL CALC-MCNC: 62.6 MG/DL (ref 0–100)
LEUKOCYTE ESTERASE UR QL STRIP.AUTO: ABNORMAL
LYMPHOCYTES # BLD: 0.8 K/UL (ref 0.8–3.5)
LYMPHOCYTES NFR BLD: 21 % (ref 12–49)
MCH RBC QN AUTO: 29.4 PG (ref 26–34)
MCHC RBC AUTO-ENTMCNC: 30.1 G/DL (ref 30–36.5)
MCV RBC AUTO: 97.7 FL (ref 80–99)
MONOCYTES # BLD: 0.5 K/UL (ref 0–1)
MONOCYTES NFR BLD: 12 % (ref 5–13)
NEUTS SEG # BLD: 2.6 K/UL (ref 1.8–8)
NEUTS SEG NFR BLD: 65 % (ref 32–75)
NITRITE UR QL STRIP.AUTO: NEGATIVE
NRBC # BLD: 0 K/UL (ref 0–0.01)
NRBC BLD-RTO: 0 PER 100 WBC
PH UR STRIP: 5.5 [PH] (ref 5–8)
PLATELET # BLD AUTO: 209 K/UL (ref 150–400)
PMV BLD AUTO: 10.9 FL (ref 8.9–12.9)
POTASSIUM SERPL-SCNC: 4.5 MMOL/L (ref 3.5–5.1)
PROT SERPL-MCNC: 7.8 G/DL (ref 6.4–8.2)
PROT UR STRIP-MCNC: ABNORMAL MG/DL
RBC # BLD AUTO: 3.88 M/UL (ref 3.8–5.2)
RBC #/AREA URNS HPF: ABNORMAL /HPF (ref 0–5)
SODIUM SERPL-SCNC: 141 MMOL/L (ref 136–145)
SP GR UR REFRACTOMETRY: 1.03 (ref 1–1.03)
TRIGL SERPL-MCNC: 92 MG/DL (ref ?–150)
UROBILINOGEN UR QL STRIP.AUTO: 1 EU/DL (ref 0.2–1)
VLDLC SERPL CALC-MCNC: 18.4 MG/DL
WBC # BLD AUTO: 4 K/UL (ref 3.6–11)
WBC URNS QL MICRO: ABNORMAL /HPF (ref 0–4)

## 2023-02-12 RX ORDER — SIMVASTATIN 40 MG/1
TABLET, FILM COATED ORAL
Qty: 90 TABLET | Refills: 3 | Status: SHIPPED | OUTPATIENT
Start: 2023-02-12

## 2023-02-27 ENCOUNTER — OFFICE VISIT (OUTPATIENT)
Dept: INTERNAL MEDICINE CLINIC | Age: 83
End: 2023-02-27
Payer: MEDICARE

## 2023-02-27 VITALS
RESPIRATION RATE: 18 BRPM | TEMPERATURE: 97.6 F | WEIGHT: 143.4 LBS | HEIGHT: 61 IN | DIASTOLIC BLOOD PRESSURE: 79 MMHG | OXYGEN SATURATION: 98 % | HEART RATE: 89 BPM | BODY MASS INDEX: 27.08 KG/M2 | SYSTOLIC BLOOD PRESSURE: 120 MMHG

## 2023-02-27 DIAGNOSIS — N18.31 STAGE 3A CHRONIC KIDNEY DISEASE (HCC): ICD-10-CM

## 2023-02-27 DIAGNOSIS — M19.90 ARTHRITIS: ICD-10-CM

## 2023-02-27 DIAGNOSIS — J84.9 ILD (INTERSTITIAL LUNG DISEASE) (HCC): ICD-10-CM

## 2023-02-27 DIAGNOSIS — H54.7 DECREASED VISUAL ACUITY: ICD-10-CM

## 2023-02-27 DIAGNOSIS — I10 PRIMARY HYPERTENSION: ICD-10-CM

## 2023-02-27 DIAGNOSIS — R73.02 IGT (IMPAIRED GLUCOSE TOLERANCE): ICD-10-CM

## 2023-02-27 DIAGNOSIS — R91.1 RIGHT UPPER LOBE PULMONARY NODULE: Primary | ICD-10-CM

## 2023-02-27 DIAGNOSIS — J44.9 CHRONIC OBSTRUCTIVE PULMONARY DISEASE, UNSPECIFIED COPD TYPE (HCC): ICD-10-CM

## 2023-02-27 PROBLEM — Z96.641 H/O TOTAL HIP ARTHROPLASTY, RIGHT: Status: RESOLVED | Noted: 2020-02-07 | Resolved: 2023-02-27

## 2023-02-27 PROBLEM — M16.12 PRIMARY OSTEOARTHRITIS OF LEFT HIP: Status: RESOLVED | Noted: 2018-07-06 | Resolved: 2023-02-27

## 2023-02-27 PROBLEM — M16.11 OSTEOARTHRITIS OF RIGHT HIP: Status: RESOLVED | Noted: 2020-02-05 | Resolved: 2023-02-27

## 2023-02-27 PROBLEM — Z96.642 HISTORY OF TOTAL HIP ARTHROPLASTY, LEFT: Status: RESOLVED | Noted: 2018-01-01 | Resolved: 2023-02-27

## 2023-02-27 PROBLEM — M16.11 PRIMARY LOCALIZED OSTEOARTHRITIS OF RIGHT HIP: Status: RESOLVED | Noted: 2020-01-31 | Resolved: 2023-02-27

## 2023-02-27 LAB
ALBUMIN SERPL-MCNC: 4 G/DL (ref 3.5–5)
ANION GAP SERPL CALC-SCNC: 6 MMOL/L (ref 5–15)
BUN SERPL-MCNC: 17 MG/DL (ref 6–20)
BUN/CREAT SERPL: 13 (ref 12–20)
CALCIUM SERPL-MCNC: 9.8 MG/DL (ref 8.5–10.1)
CHLORIDE SERPL-SCNC: 107 MMOL/L (ref 97–108)
CO2 SERPL-SCNC: 29 MMOL/L (ref 21–32)
CREAT SERPL-MCNC: 1.33 MG/DL (ref 0.55–1.02)
GLUCOSE SERPL-MCNC: 92 MG/DL (ref 65–100)
PHOSPHATE SERPL-MCNC: 3.4 MG/DL (ref 2.6–4.7)
POTASSIUM SERPL-SCNC: 4.2 MMOL/L (ref 3.5–5.1)
SODIUM SERPL-SCNC: 142 MMOL/L (ref 136–145)

## 2023-02-27 PROCEDURE — 1090F PRES/ABSN URINE INCON ASSESS: CPT | Performed by: INTERNAL MEDICINE

## 2023-02-27 PROCEDURE — 1123F ACP DISCUSS/DSCN MKR DOCD: CPT | Performed by: INTERNAL MEDICINE

## 2023-02-27 PROCEDURE — G8417 CALC BMI ABV UP PARAM F/U: HCPCS | Performed by: INTERNAL MEDICINE

## 2023-02-27 PROCEDURE — 99214 OFFICE O/P EST MOD 30 MIN: CPT | Performed by: INTERNAL MEDICINE

## 2023-02-27 PROCEDURE — G8432 DEP SCR NOT DOC, RNG: HCPCS | Performed by: INTERNAL MEDICINE

## 2023-02-27 PROCEDURE — 3074F SYST BP LT 130 MM HG: CPT | Performed by: INTERNAL MEDICINE

## 2023-02-27 PROCEDURE — G8536 NO DOC ELDER MAL SCRN: HCPCS | Performed by: INTERNAL MEDICINE

## 2023-02-27 PROCEDURE — 3078F DIAST BP <80 MM HG: CPT | Performed by: INTERNAL MEDICINE

## 2023-02-27 PROCEDURE — 1101F PT FALLS ASSESS-DOCD LE1/YR: CPT | Performed by: INTERNAL MEDICINE

## 2023-02-27 PROCEDURE — G8399 PT W/DXA RESULTS DOCUMENT: HCPCS | Performed by: INTERNAL MEDICINE

## 2023-02-27 PROCEDURE — G8427 DOCREV CUR MEDS BY ELIG CLIN: HCPCS | Performed by: INTERNAL MEDICINE

## 2023-02-27 NOTE — PROGRESS NOTES
SPORTS MEDICINE AND PRIMARY CARE  John Alonzo MD, [address] [address]  Phone:  [phone]  Fax: [phone]       Chief Complaint   Patient presents with    Hypertension   . SUBJECTIVE:    Libby Fonseca is a 80 y.o. female Patient returns today with a known history of primary hypertension, interstitial lung disease, followed by [facility], right upper lobe pulmonary nodule, CKD stage 3A, COPD, impaired glucose tolerance, primary hypertension and is seen for evaluation. Two visits to the ER. She went to [facility] on 01/12/23 for a hamstring strain and on 01/20/23 she was seen in the ER for abdominal pain. She was subsequently found to have a UTI and given Nitrofurantoin and comes in today feeling better.            Current Outpatient Medications   Medication Sig Dispense Refill    simvastatin (ZOCOR) 40 mg tablet TAKE 1 TABLET BY MOUTH AT  NIGHT 90 Tablet 3    levothyroxine (SYNTHROID) 25 mcg tablet TAKE 1 TABLET BY MOUTH ONCE DAILY BEFORE BREAKFAST 90 Tablet 3    amLODIPine (NORVASC) 5 mg tablet TAKE 1 TABLET BY MOUTH ONCE DAILY 90 Tablet 3     Past Medical History:   Diagnosis Date    Arthritis     Chronically on opiate therapy     COPD (chronic obstructive pulmonary disease) (HCC)     Diaphragm paralysis 09/27/2021    diaphragm paralysis r    Elevated cholesterol     Epicondylitis elbow, medial, right     Ex-cigarette smoker     ct 6/25/13  stopped feb 2020    H/O total hip arthroplasty, right 02/07/2020    tejas aviles md    High cholesterol     Hip pain, chronic, right 02/07/2020    thr- rick aviles md    History of total hip arthroplasty, left 2018    Hypertension     Hypothyroid     IGT (impaired glucose tolerance) 11/13/2014    ILD (interstitial lung disease) ([facility] [address]     thermoactinomyces Sacchari    Neuropathy     Onychomycosis     Right upper lobe pulmonary nodule 08/29/2022    S/P colonoscopy 12/12/2013    cecal lipoma    S/P colonoscopy 12/12/2013 hemorrhoids,diverticulosis    Tubulovillous adenoma of colon 2008    Vitamin D deficiency      Past Surgical History:   Procedure Laterality Date    HX BREAST BIOPSY Left 1980    BENIGN    HX BUNIONECTOMY Left 1978    HX BUNIONECTOMY Right 2011    HX COLONOSCOPY      HX DILATION AND CURETTAGE  1980`S    HX GI      COLONOSCOPY    HX HEENT  2017    ORAL SURGERY TO REMOVE UPPER  TEETH     HX ORTHOPAEDIC Left 1980`S     BUNIONECTOMY     HX ORTHOPAEDIC Right 2015    BUNIONECTOMY    HX POLYPECTOMY      SC ARTHRP ACETBLR/PROX FEM PROSTC AGRFT/ALGRFT Left 2018     Allergies   Allergen Reactions    Bee Venom Protein (Honey Bee) Swelling    Flu Vac 2018 [id number](Pf) Other (comments)     LOCK JAW    Lisinopril Hives and Swelling         REVIEW OF SYSTEMS:  General: negative for - chills or fever  ENT: negative for - headaches, nasal congestion or tinnitus  Respiratory: negative for - cough, hemoptysis, shortness of breath or wheezing  Cardiovascular : negative for - chest pain, edema, palpitations or shortness of breath  Gastrointestinal: negative for - abdominal pain, blood in stools, heartburn or nausea/vomiting  Genito-Urinary: no dysuria, trouble voiding, or hematuria  Musculoskeletal: negative for - gait disturbance, joint pain, joint stiffness or joint swelling  Neurological: no TIA or stroke symptoms  Hematologic: no bruises, no bleeding, no swollen glands  Integument: no lumps, mole changes, nail changes or rash  Endocrine: no malaise/lethargy or unexpected weight changes      Social History     Socioeconomic History    Marital status:    Tobacco Use    Smoking status: Light Smoker     Packs/day: 0.50     Years: 60.00     Pack years: 30.00     Types: Cigarettes    Smokeless tobacco: Never   Vaping Use    Vaping Use: Never used   Substance and Sexual Activity    Alcohol use: Not Currently     Comment: NONE IN PAST 5 YEARS    Drug use: No    Sexual activity: Not Currently   Social History Narrative Medical History: Polypectomy-tubulovillous adenoma ? may 13 2013 chest CT negative    Gyn History: Last mammogram date 2013. Last menstrual perioddate . OB History: Total pregnancies 0. Surgical History: Denies Past Surgical History    Hospitalization/Major Diagnostic Procedure: syncope         Family History: Mother:  80 yrs, heart diseaseFather:  62 yrs, MISister(s): aliveBrother(s): alive, 1 :    strokeGrandfather: alive2 brother(s) , 1 sister(s)     . Social History: Alcohol Use Patient does not use alcohol. Smoking Status Patient is a  smoker, Quit in: 13 patient    fell off the leg start his cigarettes but assures me that his day she w ill never smoked another cigarette the rest of her life. Marital Status:. Lives w ith: alone. Education/School: has completed 3 yrs college. 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 Family History   Problem Relation Age of Onset    Heart Disease Mother         PVD LEG AMPUTATION    Deep Vein Thrombosis Mother     Hypertension Mother     Alzheimer's Disease Mother     Hearing Impairment Father     Hypertension Father     Heart Failure Father     Stroke Father     Heart Attack Father     Diabetes Sister     Diabetes Brother     Heart Disease Brother         VALVULAR DISEASE    Anesth Problems Neg Hx        OBJECTIVE:    Visit Vitals  /79 (BP 1 Location: Left upper arm, BP Patient Position: Sitting)   Pulse 89   Temp 97.6 °F (36.4 °C) (Oral)   Resp 18   Ht 5' 1\" (1.549 m)   Wt 143 lb 6.4 oz (65 kg)   SpO2 98%   BMI 27.10 kg/m²     CONSTITUTIONAL: well , well nourished, appears age appropriate  EYES: perrla, eom intact  ENMT:moist mucous membranes, pharynx clear  NECK: supple.  Thyroid normal  RESPIRATORY: Chest: clear bilaterally   CARDIOVASCULAR: Heart: regular rate and rhythm  GASTROINTESTINAL: Abdomen: soft, bowel sounds active  HEMATOLOGIC: no pathological lymph nodes palpated  MUSCULOSKELETAL: Extremities: no edema, pulse 1+   INTEGUMENT: No unusual rashes or suspicious skin lesions noted. Nails appear normal.  NEUROLOGIC: non-focal exam   MENTAL STATUS: alert and oriented, appropriate affect           ASSESSMENT:  1. Right upper lobe pulmonary nodule    2. ILD (interstitial lung disease) (HCC)    3. Stage 3a chronic kidney disease ([address]    4. Chronic obstructive pulmonary disease, unspecified COPD type ([facility] [address]    5. IGT (impaired glucose tolerance)    6. Primary hypertension    7. Arthritis      Patient has a _______________ (garbled audio) nodule and suggested repeat scan in six months. This was back in August, so therefore we will order it now. History of interstitial lung disease, for which she saw [facility] apparently on one occasion. They wanted a sleep study, but she states, \"I don't have any trouble sleeping\". She has CKD stage 3, for which we will check her renal status today. COPD is stable, particularly since she stopped cigarettes. Blood pressure control is at goal.    Arthritic pains are improved, at least for the hip replacements, but she still has pain on the right. She will be back to see me in four to six months, sooner if needed. I have discussed the diagnosis with the patient and the intended plan as seen in the  Orders. The patient understands and agees with the plan. The patient has   received an after visit summary and questions were answered concerning  future plans  Patient labs and/or xrays were reviewed  Past records were reviewed. PLAN:  .  Orders Placed This Encounter    CT CHEST WO CONT    RENAL FUNCTION PANEL       Follow-up and Dispositions    Return in about 4 months (around 6/27/2023). ATTENTION:   This medical record was transcribed using an electronic medical records system.   Although proofread, it may and can contain electronic and spelling errors. Other human spelling and other errors may be present. Corrections may be executed at a later time. Please feel free to contact us for any clarifications as needed.

## 2023-02-27 NOTE — PROGRESS NOTES
Wang Weaver is a 80 y.o. female    Chief Complaint   Patient presents with    Hypertension     1. Have you been to the ER, urgent care clinic since your last visit? Hospitalized since your last visit? No    2. Have you seen or consulted any other health care providers outside of the [address] [place] [address] since your last visit? Include any pap smears or colon screening.  No

## 2023-03-20 ENCOUNTER — HOSPITAL ENCOUNTER (OUTPATIENT)
Dept: CT IMAGING | Age: 83
Discharge: HOME | End: 2023-03-20
Attending: INTERNAL MEDICINE
Payer: MEDICARE

## 2023-03-20 DIAGNOSIS — R91.1 RIGHT UPPER LOBE PULMONARY NODULE: ICD-10-CM

## 2023-03-20 PROCEDURE — 71250 CT THORAX DX C-: CPT

## 2023-05-08 RX ORDER — AMLODIPINE BESYLATE 5 MG/1
5 TABLET ORAL DAILY
Qty: 90 TABLET | Refills: 3 | Status: SHIPPED | OUTPATIENT
Start: 2023-05-08

## 2023-06-20 RX ORDER — VITAMIN B COMPLEX
1000 TABLET ORAL DAILY
COMMUNITY

## 2023-06-20 RX ORDER — ASPIRIN 81 MG/1
81 TABLET ORAL DAILY
COMMUNITY

## 2023-06-22 ENCOUNTER — ANESTHESIA EVENT (OUTPATIENT)
Facility: HOSPITAL | Age: 83
End: 2023-06-22
Payer: MEDICARE

## 2023-06-22 RX ORDER — TROPICAMIDE 10 MG/ML
1 SOLUTION/ DROPS OPHTHALMIC SEE ADMIN INSTRUCTIONS
Status: COMPLETED | OUTPATIENT
Start: 2023-06-23 | End: 2023-06-23

## 2023-06-22 RX ORDER — CYCLOPENTOLATE HYDROCHLORIDE 20 MG/ML
1 SOLUTION/ DROPS OPHTHALMIC SEE ADMIN INSTRUCTIONS
Status: COMPLETED | OUTPATIENT
Start: 2023-06-23 | End: 2023-06-23

## 2023-06-22 RX ORDER — PHENYLEPHRINE HYDROCHLORIDE 100 MG/ML
1 SOLUTION/ DROPS OPHTHALMIC SEE ADMIN INSTRUCTIONS
Status: COMPLETED | OUTPATIENT
Start: 2023-06-23 | End: 2023-06-23

## 2023-06-22 RX ORDER — DICLOFENAC SODIUM 1 MG/ML
1 SOLUTION/ DROPS OPHTHALMIC ONCE
Status: COMPLETED | OUTPATIENT
Start: 2023-06-23 | End: 2023-06-23

## 2023-06-22 RX ORDER — PROPARACAINE HYDROCHLORIDE 5 MG/ML
1 SOLUTION/ DROPS OPHTHALMIC SEE ADMIN INSTRUCTIONS
Status: COMPLETED | OUTPATIENT
Start: 2023-06-23 | End: 2023-06-23

## 2023-06-23 ENCOUNTER — HOSPITAL ENCOUNTER (OUTPATIENT)
Facility: HOSPITAL | Age: 83
Discharge: HOME | End: 2023-06-23
Attending: SPECIALIST | Admitting: SPECIALIST
Payer: MEDICARE

## 2023-06-23 ENCOUNTER — ANESTHESIA (OUTPATIENT)
Facility: HOSPITAL | Age: 83
End: 2023-06-23
Payer: MEDICARE

## 2023-06-23 VITALS
BODY MASS INDEX: 26.43 KG/M2 | RESPIRATION RATE: 20 BRPM | DIASTOLIC BLOOD PRESSURE: 75 MMHG | TEMPERATURE: 97.4 F | SYSTOLIC BLOOD PRESSURE: 132 MMHG | WEIGHT: 140 LBS | HEART RATE: 78 BPM | HEIGHT: 61 IN | OXYGEN SATURATION: 97 %

## 2023-06-23 PROCEDURE — 3700000000 HC ANESTHESIA ATTENDED CARE: Performed by: SPECIALIST

## 2023-06-23 PROCEDURE — 6370000000 HC RX 637 (ALT 250 FOR IP): Performed by: SPECIALIST

## 2023-06-23 PROCEDURE — 6360000002 HC RX W HCPCS: Performed by: NURSE ANESTHETIST, CERTIFIED REGISTERED

## 2023-06-23 PROCEDURE — 7100000000 HC PACU RECOVERY - FIRST 15 MIN: Performed by: SPECIALIST

## 2023-06-23 PROCEDURE — 2580000003 HC RX 258: Performed by: SPECIALIST

## 2023-06-23 PROCEDURE — 3600000013 HC SURGERY LEVEL 3 ADDTL 15MIN: Performed by: SPECIALIST

## 2023-06-23 PROCEDURE — 2580000003 HC RX 258: Performed by: ANESTHESIOLOGY

## 2023-06-23 PROCEDURE — 7100000001 HC PACU RECOVERY - ADDTL 15 MIN: Performed by: SPECIALIST

## 2023-06-23 PROCEDURE — 2500000003 HC RX 250 WO HCPCS: Performed by: SPECIALIST

## 2023-06-23 PROCEDURE — V2632 POST CHMBR INTRAOCULAR LENS: HCPCS | Performed by: SPECIALIST

## 2023-06-23 PROCEDURE — A4216 STERILE WATER/SALINE, 10 ML: HCPCS | Performed by: SPECIALIST

## 2023-06-23 PROCEDURE — 2500000003 HC RX 250 WO HCPCS: Performed by: NURSE ANESTHETIST, CERTIFIED REGISTERED

## 2023-06-23 PROCEDURE — 2709999900 HC NON-CHARGEABLE SUPPLY: Performed by: SPECIALIST

## 2023-06-23 PROCEDURE — 7100000010 HC PHASE II RECOVERY - FIRST 15 MIN: Performed by: SPECIALIST

## 2023-06-23 PROCEDURE — 3600000003 HC SURGERY LEVEL 3 BASE: Performed by: SPECIALIST

## 2023-06-23 PROCEDURE — 3700000001 HC ADD 15 MINUTES (ANESTHESIA): Performed by: SPECIALIST

## 2023-06-23 PROCEDURE — 6360000002 HC RX W HCPCS: Performed by: SPECIALIST

## 2023-06-23 DEVICE — ACRYSOF(R) IQ ASPHERIC NATURAL IOL, SINGLE-PIECE ACRYLIC FOLDABLE PCL, UV WITH BLUE LIGHTFILTER, 13.0MM LENGTH, 6.0MM ANTERIORASYMMETRIC BICONVEX OPTIC, PLANAR HAPTICS.
Type: IMPLANTABLE DEVICE | Site: EYE | Status: FUNCTIONAL
Brand: ACRYSOF®

## 2023-06-23 RX ORDER — SODIUM CHLORIDE 9 MG/ML
INJECTION, SOLUTION INTRAVENOUS PRN
Status: DISCONTINUED | OUTPATIENT
Start: 2023-06-23 | End: 2023-06-23 | Stop reason: HOSPADM

## 2023-06-23 RX ORDER — SODIUM CHLORIDE, SODIUM LACTATE, POTASSIUM CHLORIDE, CALCIUM CHLORIDE 600; 310; 30; 20 MG/100ML; MG/100ML; MG/100ML; MG/100ML
INJECTION, SOLUTION INTRAVENOUS CONTINUOUS
Status: DISCONTINUED | OUTPATIENT
Start: 2023-06-23 | End: 2023-06-23 | Stop reason: HOSPADM

## 2023-06-23 RX ORDER — ACETAMINOPHEN 325 MG/1
TABLET ORAL
Status: DISCONTINUED
Start: 2023-06-23 | End: 2023-06-23 | Stop reason: HOSPADM

## 2023-06-23 RX ORDER — LIDOCAINE HYDROCHLORIDE 40 MG/ML
SOLUTION TOPICAL ONCE
Status: COMPLETED | OUTPATIENT
Start: 2023-06-23 | End: 2023-06-23

## 2023-06-23 RX ORDER — LIDOCAINE HYDROCHLORIDE 20 MG/ML
INJECTION, SOLUTION EPIDURAL; INFILTRATION; INTRACAUDAL; PERINEURAL PRN
Status: DISCONTINUED | OUTPATIENT
Start: 2023-06-23 | End: 2023-06-23 | Stop reason: SDUPTHER

## 2023-06-23 RX ORDER — TETRACAINE HYDROCHLORIDE 5 MG/ML
1 SOLUTION OPHTHALMIC ONCE
Status: COMPLETED | OUTPATIENT
Start: 2023-06-23 | End: 2023-06-23

## 2023-06-23 RX ORDER — DIPHENHYDRAMINE HYDROCHLORIDE 50 MG/ML
12.5 INJECTION INTRAMUSCULAR; INTRAVENOUS
Status: DISCONTINUED | OUTPATIENT
Start: 2023-06-23 | End: 2023-06-23 | Stop reason: HOSPADM

## 2023-06-23 RX ORDER — FENTANYL CITRATE 50 UG/ML
25 INJECTION, SOLUTION INTRAMUSCULAR; INTRAVENOUS EVERY 5 MIN PRN
Status: DISCONTINUED | OUTPATIENT
Start: 2023-06-23 | End: 2023-06-23 | Stop reason: HOSPADM

## 2023-06-23 RX ORDER — LIDOCAINE HYDROCHLORIDE 10 MG/ML
1 INJECTION, SOLUTION EPIDURAL; INFILTRATION; INTRACAUDAL; PERINEURAL
Status: DISCONTINUED | OUTPATIENT
Start: 2023-06-23 | End: 2023-06-23 | Stop reason: HOSPADM

## 2023-06-23 RX ORDER — NEOMYCIN SULFATE, POLYMYXIN B SULFATE, AND DEXAMETHASONE 3.5; 10000; 1 MG/G; [USP'U]/G; MG/G
OINTMENT OPHTHALMIC ONCE
Status: COMPLETED | OUTPATIENT
Start: 2023-06-23 | End: 2023-06-23

## 2023-06-23 RX ORDER — ONDANSETRON 2 MG/ML
4 INJECTION INTRAMUSCULAR; INTRAVENOUS
Status: DISCONTINUED | OUTPATIENT
Start: 2023-06-23 | End: 2023-06-23 | Stop reason: HOSPADM

## 2023-06-23 RX ORDER — ACETAMINOPHEN 325 MG/1
650 TABLET ORAL
Status: COMPLETED | OUTPATIENT
Start: 2023-06-23 | End: 2023-06-23

## 2023-06-23 RX ORDER — SODIUM CHLORIDE 0.9 % (FLUSH) 0.9 %
5-40 SYRINGE (ML) INJECTION PRN
Status: DISCONTINUED | OUTPATIENT
Start: 2023-06-23 | End: 2023-06-23 | Stop reason: HOSPADM

## 2023-06-23 RX ORDER — PILOCARPINE HYDROCHLORIDE 20 MG/ML
1 SOLUTION/ DROPS OPHTHALMIC ONCE
Status: DISCONTINUED | OUTPATIENT
Start: 2023-06-23 | End: 2023-06-23 | Stop reason: HOSPADM

## 2023-06-23 RX ADMIN — CYCLOPENTOLATE HYDROCHLORIDE 1 DROP: 20 SOLUTION/ DROPS OPHTHALMIC at 12:50

## 2023-06-23 RX ADMIN — TROPICAMIDE 1 DROP: 10 SOLUTION/ DROPS OPHTHALMIC at 12:50

## 2023-06-23 RX ADMIN — ACETAMINOPHEN 650 MG: 325 TABLET ORAL at 15:11

## 2023-06-23 RX ADMIN — DICLOFENAC SODIUM 1 DROP: 1 SOLUTION/ DROPS OPHTHALMIC at 12:25

## 2023-06-23 RX ADMIN — PHENYLEPHRINE HYDROCHLORIDE 1 DROP: 100 SOLUTION/ DROPS OPHTHALMIC at 12:45

## 2023-06-23 RX ADMIN — EPINEPHRINE: 1 INJECTION, SOLUTION INTRAMUSCULAR; SUBCUTANEOUS at 14:54

## 2023-06-23 RX ADMIN — CYCLOPENTOLATE HYDROCHLORIDE 1 DROP: 20 SOLUTION/ DROPS OPHTHALMIC at 12:25

## 2023-06-23 RX ADMIN — EPINEPHRINE: 1 INJECTION, SOLUTION INTRAMUSCULAR; SUBCUTANEOUS at 14:53

## 2023-06-23 RX ADMIN — LIDOCAINE HYDROCHLORIDE 30 MG: 20 INJECTION, SOLUTION EPIDURAL; INFILTRATION; INTRACAUDAL; PERINEURAL at 14:16

## 2023-06-23 RX ADMIN — PROPOFOL 50 MG: 10 INJECTION, EMULSION INTRAVENOUS at 14:16

## 2023-06-23 RX ADMIN — PROPARACAINE HYDROCHLORIDE 1 DROP: 5 SOLUTION/ DROPS OPHTHALMIC at 12:50

## 2023-06-23 RX ADMIN — TROPICAMIDE 1 DROP: 10 SOLUTION/ DROPS OPHTHALMIC at 12:25

## 2023-06-23 RX ADMIN — TROPICAMIDE 1 DROP: 10 SOLUTION/ DROPS OPHTHALMIC at 12:45

## 2023-06-23 RX ADMIN — PHENYLEPHRINE HYDROCHLORIDE 1 DROP: 100 SOLUTION/ DROPS OPHTHALMIC at 12:50

## 2023-06-23 RX ADMIN — PHENYLEPHRINE HYDROCHLORIDE 1 DROP: 100 SOLUTION/ DROPS OPHTHALMIC at 12:25

## 2023-06-23 RX ADMIN — PROPARACAINE HYDROCHLORIDE 1 DROP: 5 SOLUTION/ DROPS OPHTHALMIC at 12:25

## 2023-06-23 RX ADMIN — SODIUM CHLORIDE: 9 INJECTION, SOLUTION INTRAVENOUS at 12:25

## 2023-06-23 RX ADMIN — CYCLOPENTOLATE HYDROCHLORIDE 1 DROP: 20 SOLUTION/ DROPS OPHTHALMIC at 12:45

## 2023-06-23 ASSESSMENT — PAIN - FUNCTIONAL ASSESSMENT: PAIN_FUNCTIONAL_ASSESSMENT: 0-10

## 2023-06-23 ASSESSMENT — PAIN SCALES - GENERAL
PAINLEVEL_OUTOF10: 2
PAINLEVEL_OUTOF10: 2
PAINLEVEL_OUTOF10: 5
PAINLEVEL_OUTOF10: 5
PAINLEVEL_OUTOF10: 2

## 2023-06-23 ASSESSMENT — PAIN DESCRIPTION - ORIENTATION: ORIENTATION: RIGHT

## 2023-06-23 ASSESSMENT — PAIN DESCRIPTION - DESCRIPTORS: DESCRIPTORS: BURNING

## 2023-06-23 ASSESSMENT — PAIN DESCRIPTION - LOCATION: LOCATION: EYE

## 2023-06-23 NOTE — ANESTHESIA PRE PROCEDURE
Department of Anesthesiology  Preprocedure Note       Name:  Aracelis Lagunas   Age:  80 y.o.  :  1940                                          MRN:  [id number]         Date:  2023      Surgeon: Charo Schmitt):  Lourdes Brush MD    Procedure: Procedure(s):  RIGHT EYE CATARACT EXTRACTION WITH INTRAOCULAR LENS IMPLANT    Medications prior to admission:   Prior to Admission medications    Medication Sig Start Date End Date Taking?  Authorizing Provider   aspirin 81 MG EC tablet Take 1 tablet by mouth daily   Yes Historical Provider, MD   Acetaminophen (TYLENOL 8 HOUR PO) Take 650 mg by mouth as needed   Yes Historical Provider, MD   Vitamin D (CHOLECALCIFEROL) 25 MCG (1000 UT) TABS tablet Take 1 tablet by mouth daily   Yes Historical Provider, MD   amLODIPine (NORVASC) 5 MG tablet Take 1 tablet by mouth daily 23   Michael Nicholson MD   levothyroxine (SYNTHROID) 25 MCG tablet TAKE 1 TABLET BY MOUTH ONCE DAILY BEFORE BREAKFAST 22   Ar Automatic Reconciliation   simvastatin (ZOCOR) 40 MG tablet TAKE 1 TABLET BY MOUTH AT  NIGHT 23   Ar Automatic Reconciliation       Current medications:    Current Facility-Administered Medications   Medication Dose Route Frequency Provider Last Rate Last Admin    proparacaine (ALCAINE) 0.5 % ophthalmic solution 1 drop  1 drop Right Eye See Rochelle Breath Instructions Lourdes Dubois., MD        diclofenac (VOLTAREN) 0.1 % ophthalmic solution 1 drop  1 drop Right Eye Once Lourdes Dubois., MD        cyclopentolate (CYCLOGYL) 2 % ophthalmic solution 1 drop  1 drop Right Eye See Admin Instructions Lourdes Dubois., MD        tropicamide (MYDRIACYL) 1 % ophthalmic solution 1 drop  1 drop Right Eye See Admin Instructions Lourdes Dubois., MD        phenylephrine (TOMI-SYNEPHRINE) 10 % ophthalmic solution 1 drop  1 drop Right Eye See Admin Instructions Lourdes Brush MD        cefUROXime (ZINACEF) 1.5 mg in sodium chloride (PF) 0.9 % 1.5 mL intraocular injection  1.5 mg

## 2023-06-23 NOTE — DISCHARGE INSTRUCTIONS
Discharge Instructions Post Cataract Surgery  [facility]/ Dr. Josiane Roberson  [facility], [address] , [place]  Phone: [phone]  Fax: [phone]  Call for Emergency ONLY: [phone]    1. Limited activity until seen by Dr. Ernesto Blanco tomorrow. 2.   Be careful walking, especially on steps or curbs, the eye patch may effect your depth perception. 3.   Keep the [facility] in place until seen by Dr. Ernesto Blanco tomorrow. 4.   When sleeping please sleep on left side. 5.   NO shower or bath until seen by Dr. Ernesto Blanco. 6.   You will receive further instructions at your post-op appointment tomorrow   regarding removal of eye shield & beginning post-op eye drops as well as returning to normal activities and showering. 7.   Please have someone drive you to your postop appointment. 8.   Please take Tylenol, Ibuprofen, or Aleve for discomfort. 9.   Call Dr. Hannah Bills office for severe pain unrelieved by the Tylenol, Ibuprofen, or Aleve or nausea, or call his cell phone at [phone] if after hours. 10. You received Tylenol after your procedure, you may take Tylenol at 9:00pm.      DO NOT TAKE SLEEPING MEDICATIONS OR ANTIANXIETY MEDICATIONS WHILE TAKING NARCOTIC PAIN MEDICATIONS,  ESPECIALLY THE NIGHT OF ANESTHESIA. CPAP PATIENTS BE SURE TO WEAR MACHINE WHENEVER NAPPING OR SLEEPING. DISCHARGE SUMMARY from Nurse    The following personal items collected during your admission are returned to you:   Dental Appliance:    Vision:    Hearing Aid:    Jewelry:    Clothing:    Other Valuables:      PATIENT INSTRUCTIONS:    After general anesthesia or intravenous sedation, for 24 hours or while taking prescription Narcotics:  Someone should be with you for the next 24 hours. For your own safety, a responsible adult must drive you home. Limit your activities  Recommended activity: rest today, up with assistance today. Do not climb stairs or shower unattended for the next 24 hours.   Please start with a soft

## 2023-06-23 NOTE — PERIOP NOTE
Junaid Earing  [birth year]  [id number]    Situation:  Verbal report given from: RN and Dr. Laron Peralta  Procedure: Procedure(s):  RIGHT EYE CATARACT EXTRACTION WITH INTRAOCULAR LENS IMPLANT    Background:    Preoperative diagnosis: Cataract of right eye, unspecified cataract type [H26.9]    Postoperative diagnosis: * No post-op diagnosis entered *    :  Dr. Doan Master    Assistant(s): Circulator: Rhea Cuello RN; Gray Perez RN  Scrub Person First: Jahaira Lord; Maricruz Bernal    Specimens: * No specimens in log *    Assessment:  Intra-procedure medications         Anesthesia gave intra-procedure sedation and medications, see anesthesia flow sheet     Intravenous fluids: LR@ KVO     Vital signs stable.  Heater applied for chill and pt states eye stinging      Recommendation:    Permission to share finding with sister :  yes
Permission received to review discharge instructions and discuss private health information with sister  and will have someone with them after discharge
Pt. Alert. Denies pain or chill. Discharge instructions reviewed with caregiver and patient. Allowed and answered questions. Tolerating PO fluids. Both state ready for discharge.   [phone] Discharged to car without incident
scheduled to arrive for surgery after 8:00 AM, and your AM blood sugar is >200, please call Ambulatory Surgery. I understand a pre-operative phone call will be made to verify my surgery time. In the event that I am not available, I give permission for a message to be left on my answering service and/or with another person?       Yes      Reviewed instructions with patient, able to verbalized understanding.         ___________________      ___________________      ________________  (Signature of Patient)          (Witness)                   (Date and Time)

## 2023-06-23 NOTE — ANESTHESIA POSTPROCEDURE EVALUATION
Department of Anesthesiology  Postprocedure Note    Patient: Jordon Lucero  MRN: [id number]  YOB: 1940  Date of evaluation: 6/23/2023      Procedure Summary     Date: 06/23/23 Room / Location: [facility] ASU B4 / [facility] AMBULATORY OR    Anesthesia Start: 7624 Anesthesia Stop: 6279    Procedure: RIGHT EYE CATARACT EXTRACTION WITH INTRAOCULAR LENS IMPLANT (Right: Eye) Diagnosis:       Cataract of right eye, unspecified cataract type      (Cataract of right eye, unspecified cataract type [H26.9])    Surgeons: Mary Ordonez MD Responsible Provider: Casa Betancourt MD    Anesthesia Type: MAC ASA Status: 3          Anesthesia Type: MAC    Lj Phase I: Lj Score: 10    Jl Phase II: Lj Score: 10      Anesthesia Post Evaluation    Patient location during evaluation: PACU  Patient participation: complete - patient participated  Level of consciousness: awake and alert  Pain score: 2  Airway patency: patent  Nausea & Vomiting: no nausea and no vomiting  Complications: no  Cardiovascular status: hemodynamically stable  Respiratory status: acceptable  Hydration status: euvolemic  Multimodal analgesia pain management approach

## 2023-06-24 NOTE — OP NOTE
[address]  OPERATIVE REPORT    Name:  Junior Brown  MR#:  [id number]  :  1940  ACCOUNT #:  [de-identified]  DATE OF SERVICE:  2023    PREOPERATIVE DIAGNOSIS:  ***. POSTOPERATIVE DIAGNOSIS:  ***. PROCEDURE PERFORMED:  ***. SURGEON:  Saroj Huff MD    ASSISTANT:  ***. ANESTHESIA:  ***. COMPLICATIONS:  ***. SPECIMENS REMOVED:  ***. IMPLANTS:  ***. ESTIMATED BLOOD LOSS:  ***. INDICATIONS FOR PROCEDURE:  The patient was noted to have an extremely dense hypermature cataract of the right eye and *** extreme dense cataract as well as her failure of the pupil to adequately dilate. It was elected to proceed with procedure for a complex cataract procedure. PROCEDURE:  At this point, the patient was taken to the operating room, correctly identified, where upon intravenous lines and cardiac monitoring were established. At this point with the sterile drape and preparation of the right eye was proceeded, using a *** ophthalmic preparation and cleaning as appropriate for intraocular cataract surgery. Following this, a wire lid speculum was inserted between the conjunctival fornices of the right eye. This being followed by *** lidocaine mixed with *** on the surface of the eye. At this point, a sterile fixation ring and a 75 blade was used to perform a paracentesis at the 1:30-2 o'clock hour. Following this, *** was administered by intracameral infusion without complication. Following this, an intraocular air bubble was introduced within the anterior chamber being followed by *** visualization of the anterior capsule as the lens was extremely dense and hypermaturing lens. After waiting for 1 minute 30 seconds, the trypan blue was irrigated out of the anterior chamber. This being followed by viscoelastic introduced through the paracentesis site. With the identified elias *** stains of the anterior capsule using a *** 3.0 mm keratome was used *** temporarily.

## 2023-07-03 ENCOUNTER — OFFICE VISIT (OUTPATIENT)
Facility: CLINIC | Age: 83
End: 2023-07-03
Payer: MEDICARE

## 2023-07-03 VITALS
BODY MASS INDEX: 26.83 KG/M2 | WEIGHT: 142.1 LBS | HEIGHT: 61 IN | DIASTOLIC BLOOD PRESSURE: 64 MMHG | TEMPERATURE: 98.1 F | SYSTOLIC BLOOD PRESSURE: 102 MMHG | HEART RATE: 79 BPM | RESPIRATION RATE: 20 BRPM | OXYGEN SATURATION: 100 %

## 2023-07-03 DIAGNOSIS — Z00.00 MEDICARE ANNUAL WELLNESS VISIT, SUBSEQUENT: Primary | ICD-10-CM

## 2023-07-03 DIAGNOSIS — J84.9 ILD (INTERSTITIAL LUNG DISEASE) (HCC): ICD-10-CM

## 2023-07-03 DIAGNOSIS — D64.9 ANEMIA, UNSPECIFIED TYPE: ICD-10-CM

## 2023-07-03 DIAGNOSIS — R91.1 RIGHT UPPER LOBE PULMONARY NODULE: ICD-10-CM

## 2023-07-03 DIAGNOSIS — J44.9 CHRONIC OBSTRUCTIVE PULMONARY DISEASE, UNSPECIFIED COPD TYPE (HCC): ICD-10-CM

## 2023-07-03 DIAGNOSIS — I10 PRIMARY HYPERTENSION: ICD-10-CM

## 2023-07-03 DIAGNOSIS — N18.31 STAGE 3A CHRONIC KIDNEY DISEASE (HCC): ICD-10-CM

## 2023-07-03 DIAGNOSIS — E07.9 THYROID DISEASE: ICD-10-CM

## 2023-07-03 DIAGNOSIS — E55.9 VITAMIN D DEFICIENCY: ICD-10-CM

## 2023-07-03 DIAGNOSIS — R73.02 IGT (IMPAIRED GLUCOSE TOLERANCE): ICD-10-CM

## 2023-07-03 LAB
25(OH)D3 SERPL-MCNC: 36.9 NG/ML (ref 30–100)
ANION GAP SERPL CALC-SCNC: 5 MMOL/L (ref 5–15)
APPEARANCE UR: ABNORMAL
BACTERIA URNS QL MICRO: ABNORMAL /HPF
BASOPHILS # BLD: 0.1 K/UL (ref 0–0.1)
BASOPHILS NFR BLD: 1 % (ref 0–1)
BILIRUB UR QL: NEGATIVE
BUN SERPL-MCNC: 27 MG/DL (ref 6–20)
BUN/CREAT SERPL: 24 (ref 12–20)
CALCIUM SERPL-MCNC: 10.1 MG/DL (ref 8.5–10.1)
CHLORIDE SERPL-SCNC: 110 MMOL/L (ref 97–108)
CO2 SERPL-SCNC: 27 MMOL/L (ref 21–32)
COLOR UR: ABNORMAL
CREAT SERPL-MCNC: 1.14 MG/DL (ref 0.55–1.02)
DIFFERENTIAL METHOD BLD: ABNORMAL
EOSINOPHIL # BLD: 0.1 K/UL (ref 0–0.4)
EOSINOPHIL NFR BLD: 2 % (ref 0–7)
EPITH CASTS URNS QL MICRO: ABNORMAL /LPF
ERYTHROCYTE [DISTWIDTH] IN BLOOD BY AUTOMATED COUNT: 13.4 % (ref 11.5–14.5)
EST. AVERAGE GLUCOSE BLD GHB EST-MCNC: 94 MG/DL
GLUCOSE SERPL-MCNC: 106 MG/DL (ref 65–100)
GLUCOSE UR STRIP.AUTO-MCNC: NEGATIVE MG/DL
HBA1C MFR BLD: 4.9 % (ref 4–5.6)
HCT VFR BLD AUTO: 36.9 % (ref 35–47)
HGB BLD-MCNC: 11.2 G/DL (ref 11.5–16)
HGB UR QL STRIP: NEGATIVE
IMM GRANULOCYTES # BLD AUTO: 0 K/UL (ref 0–0.04)
IMM GRANULOCYTES NFR BLD AUTO: 0 % (ref 0–0.5)
KETONES UR QL STRIP.AUTO: ABNORMAL MG/DL
LEUKOCYTE ESTERASE UR QL STRIP.AUTO: ABNORMAL
LYMPHOCYTES # BLD: 1.1 K/UL (ref 0.8–3.5)
LYMPHOCYTES NFR BLD: 24 % (ref 12–49)
MCH RBC QN AUTO: 29.2 PG (ref 26–34)
MCHC RBC AUTO-ENTMCNC: 30.4 G/DL (ref 30–36.5)
MCV RBC AUTO: 96.1 FL (ref 80–99)
MONOCYTES # BLD: 0.6 K/UL (ref 0–1)
MONOCYTES NFR BLD: 13 % (ref 5–13)
NEUTS SEG # BLD: 2.8 K/UL (ref 1.8–8)
NEUTS SEG NFR BLD: 60 % (ref 32–75)
NITRITE UR QL STRIP.AUTO: NEGATIVE
NRBC # BLD: 0 K/UL (ref 0–0.01)
NRBC BLD-RTO: 0 PER 100 WBC
PH UR STRIP: 5.5 (ref 5–8)
PLATELET # BLD AUTO: 215 K/UL (ref 150–400)
PMV BLD AUTO: 11.2 FL (ref 8.9–12.9)
POTASSIUM SERPL-SCNC: 4 MMOL/L (ref 3.5–5.1)
PROT UR STRIP-MCNC: ABNORMAL MG/DL
RBC # BLD AUTO: 3.84 M/UL (ref 3.8–5.2)
RBC #/AREA URNS HPF: ABNORMAL /HPF (ref 0–5)
SODIUM SERPL-SCNC: 142 MMOL/L (ref 136–145)
SP GR UR REFRACTOMETRY: 1.02 (ref 1–1.03)
TSH SERPL DL<=0.05 MIU/L-ACNC: 1.89 UIU/ML (ref 0.36–3.74)
URINE CULTURE IF INDICATED: ABNORMAL
UROBILINOGEN UR QL STRIP.AUTO: 1 EU/DL (ref 0.2–1)
WBC # BLD AUTO: 4.6 K/UL (ref 3.6–11)
WBC URNS QL MICRO: ABNORMAL /HPF (ref 0–4)

## 2023-07-03 PROCEDURE — 1123F ACP DISCUSS/DSCN MKR DOCD: CPT | Performed by: INTERNAL MEDICINE

## 2023-07-03 PROCEDURE — 3074F SYST BP LT 130 MM HG: CPT | Performed by: INTERNAL MEDICINE

## 2023-07-03 PROCEDURE — 3078F DIAST BP <80 MM HG: CPT | Performed by: INTERNAL MEDICINE

## 2023-07-03 PROCEDURE — 99213 OFFICE O/P EST LOW 20 MIN: CPT | Performed by: INTERNAL MEDICINE

## 2023-07-03 PROCEDURE — G0439 PPPS, SUBSEQ VISIT: HCPCS | Performed by: INTERNAL MEDICINE

## 2023-07-03 ASSESSMENT — ANXIETY QUESTIONNAIRES
IF YOU CHECKED OFF ANY PROBLEMS ON THIS QUESTIONNAIRE, HOW DIFFICULT HAVE THESE PROBLEMS MADE IT FOR YOU TO DO YOUR WORK, TAKE CARE OF THINGS AT HOME, OR GET ALONG WITH OTHER PEOPLE: NOT DIFFICULT AT ALL
5. BEING SO RESTLESS THAT IT IS HARD TO SIT STILL: 0
6. BECOMING EASILY ANNOYED OR IRRITABLE: 0
4. TROUBLE RELAXING: 0
GAD7 TOTAL SCORE: 0
7. FEELING AFRAID AS IF SOMETHING AWFUL MIGHT HAPPEN: 0
3. WORRYING TOO MUCH ABOUT DIFFERENT THINGS: 0
2. NOT BEING ABLE TO STOP OR CONTROL WORRYING: 0
1. FEELING NERVOUS, ANXIOUS, OR ON EDGE: 0

## 2023-07-03 ASSESSMENT — LIFESTYLE VARIABLES
HOW OFTEN DO YOU HAVE A DRINK CONTAINING ALCOHOL: NEVER
HOW MANY STANDARD DRINKS CONTAINING ALCOHOL DO YOU HAVE ON A TYPICAL DAY: PATIENT DOES NOT DRINK

## 2023-07-03 ASSESSMENT — PATIENT HEALTH QUESTIONNAIRE - PHQ9
SUM OF ALL RESPONSES TO PHQ9 QUESTIONS 1 & 2: 0
SUM OF ALL RESPONSES TO PHQ QUESTIONS 1-9: 0
1. LITTLE INTEREST OR PLEASURE IN DOING THINGS: 0
SUM OF ALL RESPONSES TO PHQ QUESTIONS 1-9: 0
2. FEELING DOWN, DEPRESSED OR HOPELESS: 0

## 2023-07-29 RX ORDER — LEVOTHYROXINE SODIUM 0.03 MG/1
TABLET ORAL
Qty: 90 TABLET | Refills: 3 | Status: SHIPPED | OUTPATIENT
Start: 2023-07-29

## 2023-09-15 ENCOUNTER — TRANSCRIBE ORDERS (OUTPATIENT)
Facility: HOSPITAL | Age: 83
End: 2023-09-15

## 2023-09-15 DIAGNOSIS — Z12.31 BREAST CANCER SCREENING BY MAMMOGRAM: Primary | ICD-10-CM

## 2023-10-02 ENCOUNTER — HOSPITAL ENCOUNTER (OUTPATIENT)
Facility: HOSPITAL | Age: 83
Discharge: HOME | End: 2023-10-05
Attending: INTERNAL MEDICINE
Payer: MEDICARE

## 2023-10-02 VITALS — HEIGHT: 60 IN | BODY MASS INDEX: 27.09 KG/M2 | WEIGHT: 138 LBS

## 2023-10-02 DIAGNOSIS — Z12.31 BREAST CANCER SCREENING BY MAMMOGRAM: ICD-10-CM

## 2023-10-02 PROCEDURE — 77067 SCR MAMMO BI INCL CAD: CPT

## 2023-10-24 RX ORDER — SIMVASTATIN 40 MG
TABLET ORAL
Qty: 100 TABLET | Refills: 2 | Status: SHIPPED | OUTPATIENT
Start: 2023-10-24

## 2023-10-30 ENCOUNTER — OFFICE VISIT (OUTPATIENT)
Facility: CLINIC | Age: 83
End: 2023-10-30
Payer: MEDICARE

## 2023-10-30 VITALS
HEART RATE: 81 BPM | BODY MASS INDEX: 27.23 KG/M2 | RESPIRATION RATE: 18 BRPM | OXYGEN SATURATION: 97 % | TEMPERATURE: 97.7 F | HEIGHT: 61 IN | SYSTOLIC BLOOD PRESSURE: 136 MMHG | WEIGHT: 144.2 LBS | DIASTOLIC BLOOD PRESSURE: 75 MMHG

## 2023-10-30 DIAGNOSIS — E78.5 DYSLIPIDEMIA: ICD-10-CM

## 2023-10-30 DIAGNOSIS — N18.31 STAGE 3A CHRONIC KIDNEY DISEASE (HCC): ICD-10-CM

## 2023-10-30 DIAGNOSIS — I10 PRIMARY HYPERTENSION: ICD-10-CM

## 2023-10-30 DIAGNOSIS — R73.02 IGT (IMPAIRED GLUCOSE TOLERANCE): ICD-10-CM

## 2023-10-30 DIAGNOSIS — J84.9 ILD (INTERSTITIAL LUNG DISEASE) (HCC): ICD-10-CM

## 2023-10-30 DIAGNOSIS — Z12.11 SCREEN FOR COLON CANCER: ICD-10-CM

## 2023-10-30 DIAGNOSIS — J44.9 CHRONIC OBSTRUCTIVE PULMONARY DISEASE, UNSPECIFIED COPD TYPE (HCC): Primary | ICD-10-CM

## 2023-10-30 LAB
ALBUMIN SERPL-MCNC: 3.9 G/DL (ref 3.5–5)
ALBUMIN/GLOB SERPL: 1 (ref 1.1–2.2)
ALP SERPL-CCNC: 60 U/L (ref 45–117)
ALT SERPL-CCNC: 10 U/L (ref 12–78)
ANION GAP SERPL CALC-SCNC: 4 MMOL/L (ref 5–15)
APPEARANCE UR: ABNORMAL
AST SERPL-CCNC: 18 U/L (ref 15–37)
BACTERIA URNS QL MICRO: ABNORMAL /HPF
BASOPHILS # BLD: 0 K/UL (ref 0–0.1)
BASOPHILS NFR BLD: 1 % (ref 0–1)
BILIRUB SERPL-MCNC: 0.4 MG/DL (ref 0.2–1)
BILIRUB UR QL CFM: NEGATIVE
BUN SERPL-MCNC: 21 MG/DL (ref 6–20)
BUN/CREAT SERPL: 18 (ref 12–20)
CALCIUM SERPL-MCNC: 10 MG/DL (ref 8.5–10.1)
CHLORIDE SERPL-SCNC: 110 MMOL/L (ref 97–108)
CHOLEST SERPL-MCNC: 146 MG/DL
CO2 SERPL-SCNC: 28 MMOL/L (ref 21–32)
COLOR UR: ABNORMAL
CREAT SERPL-MCNC: 1.17 MG/DL (ref 0.55–1.02)
DIFFERENTIAL METHOD BLD: NORMAL
EOSINOPHIL # BLD: 0 K/UL (ref 0–0.4)
EOSINOPHIL NFR BLD: 1 % (ref 0–7)
EPITH CASTS URNS QL MICRO: ABNORMAL /LPF
ERYTHROCYTE [DISTWIDTH] IN BLOOD BY AUTOMATED COUNT: 13 % (ref 11.5–14.5)
EST. AVERAGE GLUCOSE BLD GHB EST-MCNC: 103 MG/DL
GLOBULIN SER CALC-MCNC: 3.9 G/DL (ref 2–4)
GLUCOSE SERPL-MCNC: 107 MG/DL (ref 65–100)
GLUCOSE UR STRIP.AUTO-MCNC: NEGATIVE MG/DL
HBA1C MFR BLD: 5.2 % (ref 4–5.6)
HCT VFR BLD AUTO: 39 % (ref 35–47)
HDLC SERPL-MCNC: 69 MG/DL
HDLC SERPL: 2.1 (ref 0–5)
HGB BLD-MCNC: 12 G/DL (ref 11.5–16)
HGB UR QL STRIP: NEGATIVE
IMM GRANULOCYTES # BLD AUTO: 0 K/UL (ref 0–0.04)
IMM GRANULOCYTES NFR BLD AUTO: 0 % (ref 0–0.5)
KETONES UR QL STRIP.AUTO: ABNORMAL MG/DL
LDLC SERPL CALC-MCNC: 59.4 MG/DL (ref 0–100)
LEUKOCYTE ESTERASE UR QL STRIP.AUTO: ABNORMAL
LYMPHOCYTES # BLD: 1.1 K/UL (ref 0.8–3.5)
LYMPHOCYTES NFR BLD: 20 % (ref 12–49)
MCH RBC QN AUTO: 29.6 PG (ref 26–34)
MCHC RBC AUTO-ENTMCNC: 30.8 G/DL (ref 30–36.5)
MCV RBC AUTO: 96.3 FL (ref 80–99)
MONOCYTES # BLD: 0.6 K/UL (ref 0–1)
MONOCYTES NFR BLD: 11 % (ref 5–13)
NEUTS SEG # BLD: 3.7 K/UL (ref 1.8–8)
NEUTS SEG NFR BLD: 67 % (ref 32–75)
NITRITE UR QL STRIP.AUTO: NEGATIVE
NRBC # BLD: 0 K/UL (ref 0–0.01)
NRBC BLD-RTO: 0 PER 100 WBC
PH UR STRIP: 5 (ref 5–8)
PLATELET # BLD AUTO: 221 K/UL (ref 150–400)
PMV BLD AUTO: 11 FL (ref 8.9–12.9)
POTASSIUM SERPL-SCNC: 4 MMOL/L (ref 3.5–5.1)
PROT SERPL-MCNC: 7.8 G/DL (ref 6.4–8.2)
PROT UR STRIP-MCNC: ABNORMAL MG/DL
RBC # BLD AUTO: 4.05 M/UL (ref 3.8–5.2)
RBC #/AREA URNS HPF: ABNORMAL /HPF (ref 0–5)
SODIUM SERPL-SCNC: 142 MMOL/L (ref 136–145)
SP GR UR REFRACTOMETRY: 1.03 (ref 1–1.03)
TRIGL SERPL-MCNC: 88 MG/DL
TSH SERPL DL<=0.05 MIU/L-ACNC: 1.37 UIU/ML (ref 0.36–3.74)
UROBILINOGEN UR QL STRIP.AUTO: 1 EU/DL (ref 0.2–1)
VLDLC SERPL CALC-MCNC: 17.6 MG/DL
WBC # BLD AUTO: 5.5 K/UL (ref 3.6–11)
WBC URNS QL MICRO: ABNORMAL /HPF (ref 0–4)

## 2023-10-30 PROCEDURE — 99214 OFFICE O/P EST MOD 30 MIN: CPT | Performed by: INTERNAL MEDICINE

## 2023-10-30 PROCEDURE — 3075F SYST BP GE 130 - 139MM HG: CPT | Performed by: INTERNAL MEDICINE

## 2023-10-30 PROCEDURE — 3078F DIAST BP <80 MM HG: CPT | Performed by: INTERNAL MEDICINE

## 2023-10-30 PROCEDURE — 36415 COLL VENOUS BLD VENIPUNCTURE: CPT | Performed by: INTERNAL MEDICINE

## 2023-10-30 PROCEDURE — 1123F ACP DISCUSS/DSCN MKR DOCD: CPT | Performed by: INTERNAL MEDICINE

## 2023-10-30 ASSESSMENT — PATIENT HEALTH QUESTIONNAIRE - PHQ9
1. LITTLE INTEREST OR PLEASURE IN DOING THINGS: 0
SUM OF ALL RESPONSES TO PHQ QUESTIONS 1-9: 0
SUM OF ALL RESPONSES TO PHQ9 QUESTIONS 1 & 2: 0
SUM OF ALL RESPONSES TO PHQ QUESTIONS 1-9: 0
2. FEELING DOWN, DEPRESSED OR HOPELESS: 0

## 2024-01-08 ENCOUNTER — HOSPITAL ENCOUNTER (OUTPATIENT)
Facility: HOSPITAL | Age: 84
Setting detail: OUTPATIENT SURGERY
Discharge: HOME | End: 2024-01-08
Attending: INTERNAL MEDICINE | Admitting: INTERNAL MEDICINE
Payer: MEDICARE

## 2024-01-08 VITALS
SYSTOLIC BLOOD PRESSURE: 125 MMHG | HEIGHT: 61 IN | OXYGEN SATURATION: 94 % | DIASTOLIC BLOOD PRESSURE: 59 MMHG | TEMPERATURE: 97.9 F | WEIGHT: 146 LBS | RESPIRATION RATE: 19 BRPM | HEART RATE: 55 BPM | BODY MASS INDEX: 27.56 KG/M2

## 2024-01-08 PROCEDURE — 3600007502: Performed by: INTERNAL MEDICINE

## 2024-01-08 PROCEDURE — 99152 MOD SED SAME PHYS/QHP 5/>YRS: CPT | Performed by: INTERNAL MEDICINE

## 2024-01-08 PROCEDURE — 7100000011 HC PHASE II RECOVERY - ADDTL 15 MIN: Performed by: INTERNAL MEDICINE

## 2024-01-08 PROCEDURE — 2580000003 HC RX 258: Performed by: INTERNAL MEDICINE

## 2024-01-08 PROCEDURE — 99153 MOD SED SAME PHYS/QHP EA: CPT | Performed by: INTERNAL MEDICINE

## 2024-01-08 PROCEDURE — 6360000002 HC RX W HCPCS: Performed by: INTERNAL MEDICINE

## 2024-01-08 PROCEDURE — 2709999900 HC NON-CHARGEABLE SUPPLY: Performed by: INTERNAL MEDICINE

## 2024-01-08 PROCEDURE — 7100000010 HC PHASE II RECOVERY - FIRST 15 MIN: Performed by: INTERNAL MEDICINE

## 2024-01-08 PROCEDURE — 3600007512: Performed by: INTERNAL MEDICINE

## 2024-01-08 RX ORDER — SODIUM CHLORIDE 9 MG/ML
25 INJECTION, SOLUTION INTRAVENOUS PRN
Status: DISCONTINUED | OUTPATIENT
Start: 2024-01-08 | End: 2024-01-08 | Stop reason: HOSPADM

## 2024-01-08 RX ORDER — MIDAZOLAM HYDROCHLORIDE 1 MG/ML
10 INJECTION INTRAMUSCULAR; INTRAVENOUS ONCE
Status: COMPLETED | OUTPATIENT
Start: 2024-01-08 | End: 2024-01-08

## 2024-01-08 RX ORDER — FENTANYL CITRATE 50 UG/ML
50 INJECTION, SOLUTION INTRAMUSCULAR; INTRAVENOUS ONCE
Status: COMPLETED | OUTPATIENT
Start: 2024-01-08 | End: 2024-01-08

## 2024-01-08 RX ORDER — SODIUM CHLORIDE 0.9 % (FLUSH) 0.9 %
5-40 SYRINGE (ML) INJECTION PRN
Status: DISCONTINUED | OUTPATIENT
Start: 2024-01-08 | End: 2024-01-08 | Stop reason: HOSPADM

## 2024-01-08 RX ORDER — SODIUM CHLORIDE 0.9 % (FLUSH) 0.9 %
5-40 SYRINGE (ML) INJECTION EVERY 12 HOURS SCHEDULED
Status: DISCONTINUED | OUTPATIENT
Start: 2024-01-08 | End: 2024-01-08 | Stop reason: HOSPADM

## 2024-01-08 RX ADMIN — FENTANYL CITRATE 25 MCG: 50 INJECTION INTRAMUSCULAR; INTRAVENOUS at 11:44

## 2024-01-08 RX ADMIN — SODIUM CHLORIDE 25 ML: 9 INJECTION, SOLUTION INTRAVENOUS at 11:31

## 2024-01-08 RX ADMIN — MIDAZOLAM 1 MG: 1 INJECTION INTRAMUSCULAR; INTRAVENOUS at 11:44

## 2024-01-08 ASSESSMENT — PAIN - FUNCTIONAL ASSESSMENT: PAIN_FUNCTIONAL_ASSESSMENT: NONE - DENIES PAIN

## 2024-01-08 NOTE — PROGRESS NOTES
ARRIVAL INFORMATION:  Verified patient name and date of birth, scheduled procedure, and informed consent.     Belongings with patient include:  Clothing,Glasses        GI FOCUSED ASSESSMENT:  Neuro: Awake, alert, oriented x4  Respiratory: even and unlabored   GI: soft and non-distended      Education:Reviewed general discharge instructions and  information.

## 2024-01-08 NOTE — OP NOTE
G I Procedure Note              COLONOSCOPY   Dr. Bandar Jay   [place] office   [place] Office            Emperatriz ALEXIS Law                                   [id number]                                  [id number]   1940                                      84 y.o.                                    female      Procedure Date: 1/8/2024      [x]  Anesthesia MAC                                                                                Pre Op Diagnosis:                     1. Screen for colon cancer [Z12.11]                                                                                                                                                                        Post Op Diagnosis:                    1.  Pandiverticulosis  *                                                         2.  Internal hemorrhoids    3.             H&p completed: Yes            Anesthesia Assessment: Performed prior to procedure:      No change  Anesthesia Plan: Performed prior to procedure:                   No change       Medications: See Reviewed List and Reconcilation           Informed consent was obtained     Risk Statement:  Prior to the procedure the risks were explained to the patient and/or to the family including but not limited to perforation, bleeding, adverse drug reaction, aspiration, and even the need for possible surgery.  A colonoscopy exam is not 100% accurate which may be related to preparation or blind spots during the exam.The possibility that an abnormality and /or cancer could be missed was also discussed as well as alternative x-ray options.         Instrument:    Olympus adult Videocolonoscope                                   Immediate Procedure Reassessment Completed     With the patient in the left lateral position, a rectal examination was performed and the findings were:negative, stool guaiac negative.  The

## 2024-01-08 NOTE — PROGRESS NOTES
Emperatriz ALEXIS Law  1940  [id number]    Situation:  Verbal report received from: Blessing Pyle RN  Procedure: Procedure(s):  COLONOSCOPY DIAGNOSTIC    Background:    Preoperative diagnosis: Screen for colon cancer [Z12.11]  Postoperative diagnosis: * No post-op diagnosis entered *    :  Dr. Jay  Assistant(s): Circulator: Blessing Pyle, RN  Scrub Person First: Melodie Yi RN    Specimens: [unfilled]    Assessment:  Intra-procedure medications     Intravenous fluids: NS@ KVO     Vital signs stable     Abdominal assessment: round and soft     Recommendation:  Discharge patient per MD order.  Family or Friend   Permission to share finding with family or friend  yes

## 2024-01-08 NOTE — DISCHARGE INSTRUCTIONS
Endoscopy Discharge Instructions     Dr. Bandar Jay     [place] office                                            NAME: Emperatriz Simpson RECORD NUMBER:[id number]    AGE:  84 y.o. YOB: 1940                                                              FINAL Discharge Procedure and Diagnosis:       Procedure(s):  COLONOSCOPY DIAGNOSTIC       FINDINGS:     Diverticulosis  hemorrhoids                                        MEDICATIONS    [x] CONTINUE CURRENT MEDICATIONS     [] NEW MEDICATIONS           1.    2.    3.         Testing   Schedule              Colonoscopy Screening                                   Recommendations                    [x]    No further screening colonoscopy required         New additional  Tests  Call the office   [phone]) for the appointment time      []      []      []                                     YOUR NEXT APPOINTMENT WITH DR JAY:                                                                                                                                [x]   None follow up with pcp   []  1 week       []   2 week    []  1 month    Always keep KEEP  APPOINTMENT WITH  @PCP@ for regular medical follow up                                                                                                                         If you had a colonoscopy the \"C\" indicates specific instructions        x                                           Diet Instructions :   Ordinarily you may resume your previous diet but your initial diet should be       Light your discharge nurse will go over this with you.  Large meals can cause  abdominal discomfort after these procedures.                                                                           Specific Diet Recommendations:        [x] High fiber diet.  https://www.gicare.com/diets/        [] GERD diet: avoid fried and fatty foods, peppermint,

## 2024-01-08 NOTE — PROGRESS NOTES
Intaprocedure- unable to chart in Mar  25mg Fentanyl 1150, 1153  Versed 1mg 1150, 1155          Endoscopy Case End Note:    1206:  Procedure scope was pre-cleaned, per protocol, at bedside by TITI Sewell.      1206:  glasses returned to patient.    Abd soft, non-distended    Pt then taken to recovery area and SBAR report to receiving nurse

## 2024-01-08 NOTE — H&P
Acetaminophen (TYLENOL 8 HOUR PO) Take 650 mg by mouth as needed    Provider, MD Lamin   Vitamin D (CHOLECALCIFEROL) 25 MCG (1000 UT) TABS tablet Take 1 tablet by mouth daily    ProviderLamin MD   amLODIPine (NORVASC) 5 MG tablet Take 1 tablet by mouth daily 23   Jose Shi MD     Allergies   Allergen Reactions    Bee Venom Swelling    Fluogen [Influenza Virus Vaccine] Other (See Comments)     lockjaw    Lisinopril Hives and Swelling       Past Surgical History:   Procedure Laterality Date    BREAST BIOPSY Left     BENIGN    BUNIONECTOMY Left     BUNIONECTOMY Right     COLONOSCOPY  2013    DILATION AND CURETTAGE OF UTERUS  `S    EYE SURGERY Right 2023    RIGHT EYE CATARACT EXTRACTION WITH INTRAOCULAR LENS IMPLANT performed by Kwaku Stringer Jr., MD at [facility] AMBULATORY OR    GI      COLONOSCOPY    HEENT  2017    ORAL SURGERY TO REMOVE UPPER  TEETH     HIP SURGERY Right 2020    Total Hip Athroplasty  - molly Butcher    ORTHOPEDIC SURGERY Left      BUNIONECTOMY     ORTHOPEDIC SURGERY Right     BUNIONECTOMY    POLYPECTOMY      TOTAL HIP ARTHROPLASTY Left 2018     Family History   Problem Relation Age of Onset    Hypertension Mother     Deep Vein Thrombosis Mother     Heart Disease Mother         PVD LEG AMPUTATION    Anesth Problems Neg Hx     Heart Disease Brother         VALVULAR DISEASE    Diabetes Brother     Alzheimer's Disease Mother     Heart Attack Father     Stroke Father     Heart Failure Father     Hypertension Father     Hearing Impairment Father     Diabetes Sister       Social History     Tobacco Use    Smoking status: Former     Current packs/day: 0.00     Types: Cigarettes     Quit date:      Years since quittin.0    Smokeless tobacco: Never   Substance Use Topics    Alcohol use: Not Currently                                                      PHYSICAL EXAM   There were no vitals taken for this visit.    General

## 2024-01-27 RX ORDER — AMLODIPINE BESYLATE 5 MG/1
5 TABLET ORAL DAILY
Qty: 100 TABLET | Refills: 2 | Status: SHIPPED | OUTPATIENT
Start: 2024-01-27

## 2024-02-08 ENCOUNTER — TELEPHONE (OUTPATIENT)
Facility: CLINIC | Age: 84
End: 2024-02-08

## 2024-02-08 DIAGNOSIS — I10 PRIMARY HYPERTENSION: ICD-10-CM

## 2024-02-08 DIAGNOSIS — M19.90 ARTHRITIS: Primary | ICD-10-CM

## 2024-02-08 DIAGNOSIS — J44.9 CHRONIC OBSTRUCTIVE PULMONARY DISEASE, UNSPECIFIED COPD TYPE (HCC): ICD-10-CM

## 2024-02-08 NOTE — TELEPHONE ENCOUNTER
----- Message from Caro Vazquez sent at 2/8/2024 11:08 AM EST -----  Subject: Referral Request    Reason for referral request? Would like to request in home health care.   Provider patient wants to be referred to(if known):     Provider Phone Number(if known):    Additional Information for Provider?   ---------------------------------------------------------------------------  --------------  CALL BACK INFO    [phone]; Do not leave any message, patient will call back for answer  ---------------------------------------------------------------------------  --------------

## 2024-03-04 ENCOUNTER — OFFICE VISIT (OUTPATIENT)
Facility: CLINIC | Age: 84
End: 2024-03-04
Payer: MEDICAID

## 2024-03-04 VITALS
HEART RATE: 79 BPM | HEIGHT: 61 IN | DIASTOLIC BLOOD PRESSURE: 81 MMHG | OXYGEN SATURATION: 98 % | BODY MASS INDEX: 27.34 KG/M2 | RESPIRATION RATE: 20 BRPM | WEIGHT: 144.8 LBS | TEMPERATURE: 97.8 F | SYSTOLIC BLOOD PRESSURE: 142 MMHG

## 2024-03-04 DIAGNOSIS — R91.1 RIGHT UPPER LOBE PULMONARY NODULE: ICD-10-CM

## 2024-03-04 DIAGNOSIS — J44.9 CHRONIC OBSTRUCTIVE PULMONARY DISEASE, UNSPECIFIED COPD TYPE (HCC): ICD-10-CM

## 2024-03-04 DIAGNOSIS — I10 PRIMARY HYPERTENSION: Primary | ICD-10-CM

## 2024-03-04 DIAGNOSIS — J84.9 ILD (INTERSTITIAL LUNG DISEASE) (HCC): ICD-10-CM

## 2024-03-04 DIAGNOSIS — Z98.890 S/P COLONOSCOPY: ICD-10-CM

## 2024-03-04 DIAGNOSIS — N18.31 STAGE 3A CHRONIC KIDNEY DISEASE (HCC): ICD-10-CM

## 2024-03-04 DIAGNOSIS — R73.02 IGT (IMPAIRED GLUCOSE TOLERANCE): ICD-10-CM

## 2024-03-04 LAB
ANION GAP SERPL CALC-SCNC: 5 MMOL/L (ref 5–15)
BUN SERPL-MCNC: 21 MG/DL (ref 6–20)
BUN/CREAT SERPL: 20 (ref 12–20)
CALCIUM SERPL-MCNC: 9.4 MG/DL (ref 8.5–10.1)
CHLORIDE SERPL-SCNC: 110 MMOL/L (ref 97–108)
CO2 SERPL-SCNC: 27 MMOL/L (ref 21–32)
CREAT SERPL-MCNC: 1.05 MG/DL (ref 0.55–1.02)
EST. AVERAGE GLUCOSE BLD GHB EST-MCNC: 114 MG/DL
GLUCOSE SERPL-MCNC: 115 MG/DL (ref 65–100)
HBA1C MFR BLD: 5.6 % (ref 4–5.6)
POTASSIUM SERPL-SCNC: 4.4 MMOL/L (ref 3.5–5.1)
SODIUM SERPL-SCNC: 142 MMOL/L (ref 136–145)

## 2024-03-04 PROCEDURE — 3079F DIAST BP 80-89 MM HG: CPT | Performed by: INTERNAL MEDICINE

## 2024-03-04 PROCEDURE — 3077F SYST BP >= 140 MM HG: CPT | Performed by: INTERNAL MEDICINE

## 2024-03-04 PROCEDURE — 36415 COLL VENOUS BLD VENIPUNCTURE: CPT | Performed by: INTERNAL MEDICINE

## 2024-03-04 PROCEDURE — 1123F ACP DISCUSS/DSCN MKR DOCD: CPT | Performed by: INTERNAL MEDICINE

## 2024-03-04 PROCEDURE — 99214 OFFICE O/P EST MOD 30 MIN: CPT | Performed by: INTERNAL MEDICINE

## 2024-03-04 SDOH — ECONOMIC STABILITY: HOUSING INSECURITY
IN THE LAST 12 MONTHS, WAS THERE A TIME WHEN YOU DID NOT HAVE A STEADY PLACE TO SLEEP OR SLEPT IN A SHELTER (INCLUDING NOW)?: NO

## 2024-03-04 SDOH — ECONOMIC STABILITY: FOOD INSECURITY: WITHIN THE PAST 12 MONTHS, THE FOOD YOU BOUGHT JUST DIDN'T LAST AND YOU DIDN'T HAVE MONEY TO GET MORE.: SOMETIMES TRUE

## 2024-03-04 SDOH — ECONOMIC STABILITY: INCOME INSECURITY: HOW HARD IS IT FOR YOU TO PAY FOR THE VERY BASICS LIKE FOOD, HOUSING, MEDICAL CARE, AND HEATING?: SOMEWHAT HARD

## 2024-03-04 SDOH — ECONOMIC STABILITY: FOOD INSECURITY: WITHIN THE PAST 12 MONTHS, YOU WORRIED THAT YOUR FOOD WOULD RUN OUT BEFORE YOU GOT MONEY TO BUY MORE.: SOMETIMES TRUE

## 2024-03-04 ASSESSMENT — PATIENT HEALTH QUESTIONNAIRE - PHQ9
SUM OF ALL RESPONSES TO PHQ QUESTIONS 1-9: 0
2. FEELING DOWN, DEPRESSED OR HOPELESS: 0
1. LITTLE INTEREST OR PLEASURE IN DOING THINGS: 0
SUM OF ALL RESPONSES TO PHQ QUESTIONS 1-9: 0
SUM OF ALL RESPONSES TO PHQ9 QUESTIONS 1 & 2: 0

## 2024-03-04 NOTE — PROGRESS NOTES
Chief Complaint   Patient presents with    Hypertension     \"Have you been to the ER, urgent care clinic since your last visit?  Hospitalized since your last visit?\"    NO    “Have you seen or consulted any other health care providers outside of [facility] since your last visit?”    NO         
appropriate affect           ASSESSMENT:  1. Primary hypertension    2. S/P colonoscopy    3. Stage 3a chronic kidney disease (HCC)    4. Chronic obstructive pulmonary disease, unspecified COPD type (HCC)    5. ILD (interstitial lung disease) (HCC)    6. Right upper lobe pulmonary nodule    7. IGT (impaired glucose tolerance)      (Blank 2 second file)        I have discussed the diagnosis with the patient and the intended plan as seen in the  Orders.  The patient understands and agees with the plan.  The patient has   received an after visit summary and questions were answered concerning  future plans  Patient labs and/or xrays were reviewed  Past records were reviewed.    PLAN:  Orders Placed This Encounter   Procedures    CT CHEST WO CONTRAST     Standing Status:   Future     Standing Expiration Date:   3/4/2025    Hemoglobin A1C     Standing Status:   Future     Number of Occurrences:   1     Standing Expiration Date:   3/4/2025    Basic Metabolic Panel     Standing Status:   Future     Number of Occurrences:   1     Standing Expiration Date:   3/4/2025    MO COLLECTION VENOUS BLOOD VENIPUNCTURE        Follow-up and Dispositions    Return in about 4 months (around 7/4/2024).                ATTENTION:   This medical record was transcribed using an electronic medical records system.  Although proofread, it may and can contain electronic and spelling errors.  Other human spelling and other errors may be present.  Corrections may be executed at a later time.  Please feel free to contact us for any clarifications as needed.

## 2024-03-04 NOTE — PATIENT INSTRUCTIONS
transportation services for veterans, families in need, the elderly, and those with disabilities.  Website: https://www.[employer].org/  Phone Number: [phone]  Additional Information: Offers transport to appointments, grocery store, airport, etc. in the areas of [facility], [place], [place], and [place].      [facility] - Community transit service serving [place], [place], [place], [facility], [place], [place], [place], [place], [place], [place], and [place].  Phone: [phone] Website: https://[email or web address].org/    [facility] - Serves residents in [place], [place], [facility], [facility] and [facility].  Phone: [phone]  Website: https://www.[employer]ra[employer].org/non-emergency-medical-transportation.html    [facility] - Transport services for residents of [facility] aged 60+ or those with a short term or long-term disability.  Phone: [phone] ([phone])  Website: https://www.[email or web address].[employer]/1000/[employer]  Additional Information: One-way rides are $6 - must book 24 hours in advance  [place] [facility] - Serves seniors age 60+ who are not able to drive. Transportation is for medical appointments, grocery shopping, or personal business (ex: banking). Seniors must be ambulatory. Areas: [place] ([address], [address]) and [place] ([address], [address], [address], [address])  Phone: [place] area - [phone]; [place] area - [phone]  Website: https://www.[facility]/  [facility] - Transportation services for residents of [facility] who are 60+, disabled, or have low income (200% below Federal Poverty Level).  Website: http://www.[employer].org/get-help/transportation-services/  Call to schedule an appointment: [phone]  [facility] - Transportation services for residents of [facility] who are 60+,

## 2024-03-22 ENCOUNTER — HOSPITAL ENCOUNTER (OUTPATIENT)
Facility: HOSPITAL | Age: 84
End: 2024-03-22
Attending: INTERNAL MEDICINE
Payer: MEDICARE

## 2024-03-22 DIAGNOSIS — J44.9 CHRONIC OBSTRUCTIVE PULMONARY DISEASE, UNSPECIFIED COPD TYPE (HCC): ICD-10-CM

## 2024-03-22 DIAGNOSIS — R91.1 RIGHT UPPER LOBE PULMONARY NODULE: ICD-10-CM

## 2024-03-22 DIAGNOSIS — J84.9 ILD (INTERSTITIAL LUNG DISEASE) (HCC): ICD-10-CM

## 2024-03-22 PROCEDURE — 71250 CT THORAX DX C-: CPT

## 2024-05-01 RX ORDER — LEVOTHYROXINE SODIUM 0.03 MG/1
TABLET ORAL
Qty: 100 TABLET | Refills: 2 | Status: SHIPPED | OUTPATIENT
Start: 2024-05-01

## 2024-06-11 RX ORDER — SIMVASTATIN 40 MG
TABLET ORAL
Qty: 100 TABLET | Refills: 2 | Status: SHIPPED | OUTPATIENT
Start: 2024-06-11

## 2024-06-24 ENCOUNTER — TELEPHONE (OUTPATIENT)
Facility: CLINIC | Age: 84
End: 2024-06-24

## 2024-06-24 RX ORDER — ALBUTEROL SULFATE 90 UG/1
2 AEROSOL, METERED RESPIRATORY (INHALATION) 4 TIMES DAILY PRN
Qty: 54 G | Refills: 11 | Status: SHIPPED | OUTPATIENT
Start: 2024-06-24

## 2024-06-24 RX ORDER — UMECLIDINIUM BROMIDE AND VILANTEROL TRIFENATATE 62.5; 25 UG/1; UG/1
1 POWDER RESPIRATORY (INHALATION) DAILY
Qty: 1 EACH | Refills: 11 | Status: SHIPPED | OUTPATIENT
Start: 2024-06-24

## 2024-07-22 ENCOUNTER — OFFICE VISIT (OUTPATIENT)
Facility: CLINIC | Age: 84
End: 2024-07-22
Payer: MEDICARE

## 2024-07-22 VITALS
SYSTOLIC BLOOD PRESSURE: 104 MMHG | OXYGEN SATURATION: 92 % | BODY MASS INDEX: 26.04 KG/M2 | TEMPERATURE: 98.1 F | RESPIRATION RATE: 16 BRPM | HEART RATE: 89 BPM | HEIGHT: 61 IN | DIASTOLIC BLOOD PRESSURE: 66 MMHG | WEIGHT: 137.9 LBS

## 2024-07-22 DIAGNOSIS — R73.02 IGT (IMPAIRED GLUCOSE TOLERANCE): ICD-10-CM

## 2024-07-22 DIAGNOSIS — R09.02 HYPOXEMIA: ICD-10-CM

## 2024-07-22 DIAGNOSIS — J84.9 ILD (INTERSTITIAL LUNG DISEASE) (HCC): ICD-10-CM

## 2024-07-22 DIAGNOSIS — J43.9 PULMONARY EMPHYSEMA, UNSPECIFIED EMPHYSEMA TYPE (HCC): ICD-10-CM

## 2024-07-22 DIAGNOSIS — I10 PRIMARY HYPERTENSION: ICD-10-CM

## 2024-07-22 DIAGNOSIS — J44.9 CHRONIC OBSTRUCTIVE PULMONARY DISEASE, UNSPECIFIED COPD TYPE (HCC): ICD-10-CM

## 2024-07-22 DIAGNOSIS — N18.31 STAGE 3A CHRONIC KIDNEY DISEASE (HCC): ICD-10-CM

## 2024-07-22 DIAGNOSIS — Z00.00 MEDICARE ANNUAL WELLNESS VISIT, SUBSEQUENT: Primary | ICD-10-CM

## 2024-07-22 LAB
ANION GAP SERPL CALC-SCNC: 7 MMOL/L (ref 5–15)
BUN SERPL-MCNC: 17 MG/DL (ref 6–20)
BUN/CREAT SERPL: 13 (ref 12–20)
CALCIUM SERPL-MCNC: 10 MG/DL (ref 8.5–10.1)
CHLORIDE SERPL-SCNC: 109 MMOL/L (ref 97–108)
CO2 SERPL-SCNC: 25 MMOL/L (ref 21–32)
CREAT SERPL-MCNC: 1.3 MG/DL (ref 0.55–1.02)
EST. AVERAGE GLUCOSE BLD GHB EST-MCNC: 108 MG/DL
GLUCOSE SERPL-MCNC: 114 MG/DL (ref 65–100)
HBA1C MFR BLD: 5.4 % (ref 4–5.6)
POTASSIUM SERPL-SCNC: 4 MMOL/L (ref 3.5–5.1)
SODIUM SERPL-SCNC: 141 MMOL/L (ref 136–145)

## 2024-07-22 PROCEDURE — 99214 OFFICE O/P EST MOD 30 MIN: CPT | Performed by: INTERNAL MEDICINE

## 2024-07-22 PROCEDURE — G0439 PPPS, SUBSEQ VISIT: HCPCS | Performed by: INTERNAL MEDICINE

## 2024-07-22 PROCEDURE — 3074F SYST BP LT 130 MM HG: CPT | Performed by: INTERNAL MEDICINE

## 2024-07-22 PROCEDURE — 1123F ACP DISCUSS/DSCN MKR DOCD: CPT | Performed by: INTERNAL MEDICINE

## 2024-07-22 PROCEDURE — 3078F DIAST BP <80 MM HG: CPT | Performed by: INTERNAL MEDICINE

## 2024-07-22 NOTE — PROGRESS NOTES
Chief Complaint   Patient presents with    Medicare AWV       \"Have you been to the ER, urgent care clinic since your last visit?  Hospitalized since your last visit?\"    NO    “Have you seen or consulted any other health care providers outside of [facility] since your last visit?”    NO            Click Here for Release of Records Request  
Medicare Annual Wellness Visit    Emperatriz Simpson is here for Medicare AWV    Assessment & Plan   Medicare annual wellness visit, subsequent  Recommendations for Preventive Services Due: see orders and patient instructions/AVS.  Recommended screening schedule for the next 5-10 years is provided to the patient in written form: see Patient Instructions/AVS.     No follow-ups on file.     Subjective       Patient's complete Health Risk Assessment and screening values have been reviewed and are found in Flowsheets. The following problems were reviewed today and where indicated follow up appointments were made and/or referrals ordered.    Positive Risk Factor Screenings with Interventions:    Fall Risk:  Do you feel unsteady or are you worried about falling? : (!) yes  2 or more falls in past year?: no  Fall with injury in past year?: no     Interventions:    Reviewed medications, home hazards, visual acuity, and co-morbidities that can increase risk for falls  See A/P for plan and any pertinent orders    Cognitive:   Clock Drawing Test (CDT): (!) Abnormal  Words recalled: 3 Words Recalled  Total Score: 3  Total Score Interpretation: Normal Mini-Cog  Interventions:  See A/P for plan and any pertinent orders            Inactivity:  On average, how many days per week do you engage in moderate to strenuous exercise (like a brisk walk)?: 0 days (!) Abnormal  On average, how many minutes do you engage in exercise at this level?: 0 min  Interventions:  See A/P for plan and any pertinent orders    Poor Eating Habits/Diet:  Do you eat balanced/healthy meals regularly?: (!) No  Interventions:  See A/P for plan and any pertinent orders     Dentist Screen:  Have you seen the dentist within the past year?: (!) No    Intervention:  See A/P for any pertinent orders    Hearing Screen:  Do you or your family notice any trouble with your hearing that hasn't been managed with hearing aids?: (!) Yes    Interventions:  See A/P for any pertinent 
chronic kidney disease (HCC)    4. ILD (interstitial lung disease) ([facility])    5. IGT (impaired glucose tolerance)    6. Primary hypertension    7. Hypoxemia    8. Pulmonary emphysema, unspecified emphysema type ([facility])      Advanced care planning discussed.  Patient requested a full code status.  If resuscitative efforts seem futile, she wants it stopped and does not want to be on ventilator for more than three days.    She has COPD, which clinically is advancing.  More short of breath.  I think this is related to hypoxemia and we will ask for a six-minute walk test, as well as an overnight oximetry to see if she qualifies for oxygen.  If she does, then we will request oxygen for her.  We discuss this with her.    She has CKD stage 3, for which we will check a renal panel today.    She has interstitial lung disease, which was previously followed by [facility]. We will get their last note.    History of impaired glucose tolerance remains stable.    Blood pressure remains at goal.    She will be back to see us in four months, sooner if she has any problems.        I have discussed the diagnosis with the patient and the intended plan as seen in the  Orders.  The patient understands and agees with the plan.  The patient has   received an after visit summary and questions were answered concerning  future plans  Patient labs and/or xrays were reviewed  Past records were reviewed.    PLAN:  Orders Placed This Encounter   Procedures    Hemoglobin A1C     Standing Status:   Future     Standing Expiration Date:   7/22/2025    Basic Metabolic Panel     Standing Status:   Future     Standing Expiration Date:   7/22/2025    FULL CODE    Pulse oximetry, overnight     Standing Status:   Future     Standing Expiration Date:   7/22/2025    AL COLLECTION VENOUS BLOOD VENIPUNCTURE    6 Minute Walk Test     Standing Status:   Future     Standing Expiration Date:   7/22/2025                   ATTENTION:   This medical

## 2024-07-23 DIAGNOSIS — N18.32 STAGE 3B CHRONIC KIDNEY DISEASE (HCC): ICD-10-CM

## 2024-07-23 DIAGNOSIS — N18.32 STAGE 3B CHRONIC KIDNEY DISEASE (HCC): Primary | ICD-10-CM

## 2024-08-02 ENCOUNTER — TELEPHONE (OUTPATIENT)
Facility: CLINIC | Age: 84
End: 2024-08-02

## 2024-08-02 NOTE — TELEPHONE ENCOUNTER
----- Message from Jose Shi MD sent at 7/23/2024  7:57 AM EDT -----  Advised patient of declining kidney function and recommendations to start Jardiance.  Stop the Jardiance if she develops vaginal ulcers or yeast infection and notify us

## 2024-08-02 NOTE — TELEPHONE ENCOUNTER
Patient notified of jardiance at pharmacy and she states she has it and we advise her vaginal yeast or ulcers to stop and call the office

## 2024-10-03 ENCOUNTER — HOSPITAL ENCOUNTER (OUTPATIENT)
Facility: HOSPITAL | Age: 84
Discharge: HOME | End: 2024-10-06
Attending: INTERNAL MEDICINE
Payer: MEDICARE

## 2024-10-03 VITALS — HEIGHT: 62 IN | BODY MASS INDEX: 23.92 KG/M2 | WEIGHT: 130 LBS

## 2024-10-03 DIAGNOSIS — Z12.31 VISIT FOR SCREENING MAMMOGRAM: ICD-10-CM

## 2024-10-03 PROCEDURE — 77063 BREAST TOMOSYNTHESIS BI: CPT

## 2024-10-10 RX ORDER — AMLODIPINE BESYLATE 5 MG/1
5 TABLET ORAL DAILY
Qty: 100 TABLET | Refills: 2 | Status: SHIPPED | OUTPATIENT
Start: 2024-10-10

## 2024-11-25 ENCOUNTER — OFFICE VISIT (OUTPATIENT)
Facility: CLINIC | Age: 84
End: 2024-11-25
Payer: MEDICARE

## 2024-11-25 VITALS
BODY MASS INDEX: 24.8 KG/M2 | TEMPERATURE: 97.9 F | DIASTOLIC BLOOD PRESSURE: 70 MMHG | SYSTOLIC BLOOD PRESSURE: 110 MMHG | RESPIRATION RATE: 16 BRPM | HEIGHT: 62 IN | WEIGHT: 134.8 LBS | OXYGEN SATURATION: 95 % | HEART RATE: 99 BPM

## 2024-11-25 DIAGNOSIS — G62.9 NEUROPATHY: ICD-10-CM

## 2024-11-25 DIAGNOSIS — J84.9 ILD (INTERSTITIAL LUNG DISEASE) (HCC): Primary | ICD-10-CM

## 2024-11-25 DIAGNOSIS — E55.9 VITAMIN D DEFICIENCY: ICD-10-CM

## 2024-11-25 DIAGNOSIS — E78.5 DYSLIPIDEMIA: ICD-10-CM

## 2024-11-25 DIAGNOSIS — R73.02 IGT (IMPAIRED GLUCOSE TOLERANCE): ICD-10-CM

## 2024-11-25 DIAGNOSIS — N18.31 STAGE 3A CHRONIC KIDNEY DISEASE (HCC): ICD-10-CM

## 2024-11-25 PROCEDURE — 1126F AMNT PAIN NOTED NONE PRSNT: CPT | Performed by: INTERNAL MEDICINE

## 2024-11-25 PROCEDURE — 1123F ACP DISCUSS/DSCN MKR DOCD: CPT | Performed by: INTERNAL MEDICINE

## 2024-11-25 PROCEDURE — 99214 OFFICE O/P EST MOD 30 MIN: CPT | Performed by: INTERNAL MEDICINE

## 2024-11-25 PROCEDURE — 3078F DIAST BP <80 MM HG: CPT | Performed by: INTERNAL MEDICINE

## 2024-11-25 PROCEDURE — 3074F SYST BP LT 130 MM HG: CPT | Performed by: INTERNAL MEDICINE

## 2024-11-25 PROCEDURE — 1159F MED LIST DOCD IN RCRD: CPT | Performed by: INTERNAL MEDICINE

## 2024-11-25 RX ORDER — UMECLIDINIUM BROMIDE AND VILANTEROL TRIFENATATE 62.5; 25 UG/1; UG/1
1 POWDER RESPIRATORY (INHALATION) DAILY
Qty: 1 EACH | Refills: 11 | Status: SHIPPED | OUTPATIENT
Start: 2024-11-25

## 2024-11-25 NOTE — PROGRESS NOTES
Chief Complaint   Patient presents with    Follow-up     Pt is here follow up and states that she has been experiencing some discharge and states that she was sent a urine sample kit in the mail from her insurance and states that she was not sure if she should do it or not.      \"Have you been to the ER, urgent care clinic since your last visit?  Hospitalized since your last visit?\"    NO    “Have you seen or consulted any other health care providers outside our system since your last visit?”    NO

## 2024-11-25 NOTE — PROGRESS NOTES
SPORTS MEDICINE AND PRIMARY CARE  Jose Shi MD, FACP, CMD  [address] [facility] [address]  Phone:  [phone]  Fax: [phone]       Chief Complaint   Patient presents with    Follow-up     Pt is here follow up and states that she has been experiencing some discharge and states that she was sent a urine sample kit in the mail from her insurance and states that she was not sure if she should do it or not.    .      SUBJECTIVE:  History of Present Illness         Emperatriz Simpson is a 84 y.o. female The patient is here today for an evaluation of her known history of interstitial lung disease, COPD, chronic renal disease, impaired glucose tolerance, and dyslipidemia.    She is not currently under the care of a pulmonologist and is only taking Anoro for her lung condition. She also takes vitamin D supplements.    She has been seeking home assistance and has finally found someone through [employer].    She reports no issues with urinary incontinence.    She was previously on Jardiance but discontinued it due to the presence of mucus in her underwear, which she had not experienced before. The mucus was described as white and sticky. She noticed a decrease in the mucus after stopping Jardiance.       Current Outpatient Medications   Medication Sig Dispense Refill    amLODIPine (NORVASC) 5 MG tablet TAKE 1 TABLET BY MOUTH DAILY 100 tablet 2    albuterol sulfate HFA (VENTOLIN HFA) 108 (90 Base) MCG/ACT inhaler Inhale 2 puffs into the lungs 4 times daily as needed for Wheezing 54 g 11    simvastatin (ZOCOR) 40 MG tablet TAKE 1 TABLET BY MOUTH AT NIGHT 100 tablet 2    levothyroxine (SYNTHROID) 25 MCG tablet TAKE 1 TABLET BY MOUTH ONCE  DAILY BEFORE BREAKFAST 100 tablet 2    aspirin 81 MG EC tablet Take 1 tablet by mouth daily      Acetaminophen (TYLENOL 8 HOUR PO) Take 650 mg by mouth as needed      Vitamin D (CHOLECALCIFEROL) 25 MCG (1000 UT) TABS tablet Take 1 tablet by mouth daily

## 2024-11-26 LAB
25(OH)D3 SERPL-MCNC: 38.8 NG/ML (ref 30–100)
ALBUMIN SERPL-MCNC: 3.9 G/DL (ref 3.5–5)
ALBUMIN/GLOB SERPL: 1 (ref 1.1–2.2)
ALP SERPL-CCNC: 62 U/L (ref 45–117)
ALT SERPL-CCNC: 13 U/L (ref 12–78)
ANION GAP SERPL CALC-SCNC: 5 MMOL/L (ref 2–12)
APPEARANCE UR: ABNORMAL
AST SERPL-CCNC: 21 U/L (ref 15–37)
BACTERIA SPEC CULT: NORMAL
BACTERIA URNS QL MICRO: ABNORMAL /HPF
BASOPHILS # BLD: 0.1 K/UL (ref 0–0.1)
BASOPHILS NFR BLD: 1 % (ref 0–1)
BILIRUB SERPL-MCNC: 0.5 MG/DL (ref 0.2–1)
BILIRUB UR QL CFM: NEGATIVE
BUN SERPL-MCNC: 20 MG/DL (ref 6–20)
BUN/CREAT SERPL: 16 (ref 12–20)
CALCIUM SERPL-MCNC: 10.2 MG/DL (ref 8.5–10.1)
CC UR VC: NORMAL
CHLORIDE SERPL-SCNC: 111 MMOL/L (ref 97–108)
CHOLEST SERPL-MCNC: 146 MG/DL
CO2 SERPL-SCNC: 26 MMOL/L (ref 21–32)
COLOR UR: ABNORMAL
CREAT SERPL-MCNC: 1.25 MG/DL (ref 0.55–1.02)
DIFFERENTIAL METHOD BLD: NORMAL
EOSINOPHIL # BLD: 0.1 K/UL (ref 0–0.4)
EOSINOPHIL NFR BLD: 2 % (ref 0–7)
EPITH CASTS URNS QL MICRO: ABNORMAL /LPF
ERYTHROCYTE [DISTWIDTH] IN BLOOD BY AUTOMATED COUNT: 13.5 % (ref 11.5–14.5)
EST. AVERAGE GLUCOSE BLD GHB EST-MCNC: 108 MG/DL
GLOBULIN SER CALC-MCNC: 3.9 G/DL (ref 2–4)
GLUCOSE SERPL-MCNC: 97 MG/DL (ref 65–100)
GLUCOSE UR STRIP.AUTO-MCNC: NEGATIVE MG/DL
HBA1C MFR BLD: 5.4 % (ref 4–5.6)
HCT VFR BLD AUTO: 40.7 % (ref 35–47)
HDLC SERPL-MCNC: 66 MG/DL
HDLC SERPL: 2.2 (ref 0–5)
HGB BLD-MCNC: 12.7 G/DL (ref 11.5–16)
HGB UR QL STRIP: NEGATIVE
IMM GRANULOCYTES # BLD AUTO: 0 K/UL (ref 0–0.04)
IMM GRANULOCYTES NFR BLD AUTO: 0 % (ref 0–0.5)
KETONES UR QL STRIP.AUTO: ABNORMAL MG/DL
LDLC SERPL CALC-MCNC: 58.8 MG/DL (ref 0–100)
LEUKOCYTE ESTERASE UR QL STRIP.AUTO: ABNORMAL
LYMPHOCYTES # BLD: 1 K/UL (ref 0.8–3.5)
LYMPHOCYTES NFR BLD: 22 % (ref 12–49)
MCH RBC QN AUTO: 29.5 PG (ref 26–34)
MCHC RBC AUTO-ENTMCNC: 31.2 G/DL (ref 30–36.5)
MCV RBC AUTO: 94.7 FL (ref 80–99)
MONOCYTES # BLD: 0.5 K/UL (ref 0–1)
MONOCYTES NFR BLD: 12 % (ref 5–13)
NEUTS SEG # BLD: 2.9 K/UL (ref 1.8–8)
NEUTS SEG NFR BLD: 63 % (ref 32–75)
NITRITE UR QL STRIP.AUTO: NEGATIVE
NRBC # BLD: 0 K/UL (ref 0–0.01)
NRBC BLD-RTO: 0 PER 100 WBC
PH UR STRIP: 5 (ref 5–8)
PLATELET # BLD AUTO: 225 K/UL (ref 150–400)
PMV BLD AUTO: 10.9 FL (ref 8.9–12.9)
POTASSIUM SERPL-SCNC: 4.2 MMOL/L (ref 3.5–5.1)
PROT SERPL-MCNC: 7.8 G/DL (ref 6.4–8.2)
PROT UR STRIP-MCNC: 30 MG/DL
RBC # BLD AUTO: 4.3 M/UL (ref 3.8–5.2)
RBC #/AREA URNS HPF: ABNORMAL /HPF (ref 0–5)
SERVICE CMNT-IMP: NORMAL
SODIUM SERPL-SCNC: 142 MMOL/L (ref 136–145)
SP GR UR REFRACTOMETRY: >1.03
TRIGL SERPL-MCNC: 106 MG/DL
TSH SERPL DL<=0.05 MIU/L-ACNC: 1.62 UIU/ML (ref 0.36–3.74)
URINE CULTURE IF INDICATED: ABNORMAL
UROBILINOGEN UR QL STRIP.AUTO: 1 EU/DL (ref 0.2–1)
VLDLC SERPL CALC-MCNC: 21.2 MG/DL
WBC # BLD AUTO: 4.6 K/UL (ref 3.6–11)
WBC URNS QL MICRO: ABNORMAL /HPF (ref 0–4)

## 2025-01-13 RX ORDER — LEVOTHYROXINE SODIUM 25 UG/1
TABLET ORAL
Qty: 100 TABLET | Refills: 3 | Status: SHIPPED | OUTPATIENT
Start: 2025-01-13 | End: 2025-01-16 | Stop reason: SDUPTHER

## 2025-01-16 RX ORDER — VITAMIN B COMPLEX
1000 TABLET ORAL DAILY
Qty: 90 TABLET | Refills: 3 | Status: SHIPPED | OUTPATIENT
Start: 2025-01-16

## 2025-01-16 RX ORDER — LEVOTHYROXINE SODIUM 25 UG/1
25 TABLET ORAL
Qty: 100 TABLET | Refills: 3 | Status: SHIPPED | OUTPATIENT
Start: 2025-01-16

## 2025-01-16 RX ORDER — SIMVASTATIN 40 MG
40 TABLET ORAL NIGHTLY
Qty: 100 TABLET | Refills: 2 | Status: SHIPPED | OUTPATIENT
Start: 2025-01-16

## 2025-01-16 RX ORDER — AMLODIPINE BESYLATE 5 MG/1
5 TABLET ORAL DAILY
Qty: 100 TABLET | Refills: 2 | Status: SHIPPED | OUTPATIENT
Start: 2025-01-16

## 2025-01-16 RX ORDER — ALBUTEROL SULFATE 90 UG/1
2 INHALANT RESPIRATORY (INHALATION) 4 TIMES DAILY PRN
Qty: 54 G | Refills: 3 | Status: SHIPPED | OUTPATIENT
Start: 2025-01-16

## 2025-01-16 RX ORDER — ASPIRIN 81 MG/1
81 TABLET ORAL DAILY
Qty: 90 TABLET | Refills: 3 | Status: SHIPPED | OUTPATIENT
Start: 2025-01-16

## 2025-01-16 RX ORDER — UMECLIDINIUM BROMIDE AND VILANTEROL TRIFENATATE 62.5; 25 UG/1; UG/1
1 POWDER RESPIRATORY (INHALATION) DAILY
Qty: 3 EACH | Refills: 3 | Status: SHIPPED | OUTPATIENT
Start: 2025-01-16

## 2025-01-21 RX ORDER — AMLODIPINE BESYLATE 5 MG/1
5 TABLET ORAL DAILY
Qty: 100 TABLET | Refills: 2 | OUTPATIENT
Start: 2025-01-21

## 2025-01-21 RX ORDER — SIMVASTATIN 40 MG
40 TABLET ORAL NIGHTLY
Qty: 100 TABLET | Refills: 2 | OUTPATIENT
Start: 2025-01-21

## 2025-03-24 ENCOUNTER — OFFICE VISIT (OUTPATIENT)
Facility: CLINIC | Age: 85
End: 2025-03-24

## 2025-03-24 VITALS
OXYGEN SATURATION: 96 % | HEIGHT: 62 IN | HEART RATE: 80 BPM | WEIGHT: 130.4 LBS | SYSTOLIC BLOOD PRESSURE: 134 MMHG | TEMPERATURE: 98.5 F | DIASTOLIC BLOOD PRESSURE: 77 MMHG | RESPIRATION RATE: 16 BRPM | BODY MASS INDEX: 24 KG/M2

## 2025-03-24 DIAGNOSIS — J44.9 CHRONIC OBSTRUCTIVE PULMONARY DISEASE, UNSPECIFIED COPD TYPE (HCC): ICD-10-CM

## 2025-03-24 DIAGNOSIS — J84.9 ILD (INTERSTITIAL LUNG DISEASE) (HCC): ICD-10-CM

## 2025-03-24 DIAGNOSIS — I10 PRIMARY HYPERTENSION: ICD-10-CM

## 2025-03-24 DIAGNOSIS — M19.90 ARTHRITIS: ICD-10-CM

## 2025-03-24 DIAGNOSIS — N18.31 STAGE 3A CHRONIC KIDNEY DISEASE (HCC): ICD-10-CM

## 2025-03-24 DIAGNOSIS — Z00.00 MEDICARE ANNUAL WELLNESS VISIT, SUBSEQUENT: Primary | ICD-10-CM

## 2025-03-24 DIAGNOSIS — R73.02 IGT (IMPAIRED GLUCOSE TOLERANCE): ICD-10-CM

## 2025-03-24 SDOH — ECONOMIC STABILITY: FOOD INSECURITY: WITHIN THE PAST 12 MONTHS, THE FOOD YOU BOUGHT JUST DIDN'T LAST AND YOU DIDN'T HAVE MONEY TO GET MORE.: NEVER TRUE

## 2025-03-24 SDOH — ECONOMIC STABILITY: FOOD INSECURITY: WITHIN THE PAST 12 MONTHS, YOU WORRIED THAT YOUR FOOD WOULD RUN OUT BEFORE YOU GOT MONEY TO BUY MORE.: NEVER TRUE

## 2025-03-24 ASSESSMENT — ANXIETY QUESTIONNAIRES
2. NOT BEING ABLE TO STOP OR CONTROL WORRYING: SEVERAL DAYS
7. FEELING AFRAID AS IF SOMETHING AWFUL MIGHT HAPPEN: SEVERAL DAYS
4. TROUBLE RELAXING: SEVERAL DAYS
1. FEELING NERVOUS, ANXIOUS, OR ON EDGE: SEVERAL DAYS
3. WORRYING TOO MUCH ABOUT DIFFERENT THINGS: SEVERAL DAYS
5. BEING SO RESTLESS THAT IT IS HARD TO SIT STILL: SEVERAL DAYS
IF YOU CHECKED OFF ANY PROBLEMS ON THIS QUESTIONNAIRE, HOW DIFFICULT HAVE THESE PROBLEMS MADE IT FOR YOU TO DO YOUR WORK, TAKE CARE OF THINGS AT HOME, OR GET ALONG WITH OTHER PEOPLE: SOMEWHAT DIFFICULT
GAD7 TOTAL SCORE: 7
6. BECOMING EASILY ANNOYED OR IRRITABLE: SEVERAL DAYS

## 2025-03-24 ASSESSMENT — PATIENT HEALTH QUESTIONNAIRE - PHQ9
SUM OF ALL RESPONSES TO PHQ QUESTIONS 1-9: 0
1. LITTLE INTEREST OR PLEASURE IN DOING THINGS: NOT AT ALL
2. FEELING DOWN, DEPRESSED OR HOPELESS: NOT AT ALL

## 2025-03-24 ASSESSMENT — LIFESTYLE VARIABLES
HOW MANY STANDARD DRINKS CONTAINING ALCOHOL DO YOU HAVE ON A TYPICAL DAY: PATIENT DOES NOT DRINK
HOW OFTEN DO YOU HAVE A DRINK CONTAINING ALCOHOL: NEVER

## 2025-03-24 NOTE — PROGRESS NOTES
Medicare Annual Wellness Visit    Emperatriz Simpson is here for Medicare AWV    Assessment & Plan   Medicare annual wellness visit, subsequent     No follow-ups on file.     Subjective       Patient's complete Health Risk Assessment and screening values have been reviewed and are found in Flowsheets. The following problems were reviewed today and where indicated follow up appointments were made and/or referrals ordered.    Positive Risk Factor Screenings with Interventions:    Fall Risk:  Do you feel unsteady or are you worried about falling? : (!) yes  2 or more falls in past year?: no  Fall with injury in past year?: no     Interventions:    Reviewed medications, home hazards, visual acuity, and co-morbidities that can increase risk for falls  See A/P for plan and any pertinent orders            General HRA Questions:  Select all that apply: (!) Anger  Interventions - Anger:  See A/P for plan and any pertinent orders      Inactivity:  On average, how many days per week do you engage in moderate to strenuous exercise (like a brisk walk)?: 2 days (!) Abnormal  On average, how many minutes do you engage in exercise at this level?: 20 min  Interventions:  See A/P for plan and any pertinent orders          ADL's:   Patient reports needing help with:  Select all that apply: (!) Bathing  Interventions:  See A/P for plan and any pertinent orders                  Objective   Vitals:    03/24/25 1106   BP: 134/77   BP Site: Left Upper Arm   Patient Position: Sitting   BP Cuff Size: Medium Adult   Pulse: 80   Resp: 16   Temp: 98.5 °F (36.9 °C)   TempSrc: Oral   SpO2: 96%   Weight: 59.1 kg (130 lb 6.4 oz)   Height: 1.575 m (5' 2\")      Body mass index is 23.85 kg/m².        General Appearance: alert and oriented to person, place and time, well developed and well- nourished, in no acute distress  Skin: warm and dry, no rash or erythema  Head: normocephalic and atraumatic  Eyes: pupils equal, round, and reactive to light, extraocular

## 2025-03-24 NOTE — PROGRESS NOTES
SPORTS MEDICINE AND PRIMARY CARE  Jose Shi MD, FACP, CMD  [address] [facility] [address]  Phone:  [phone]  Fax: [phone]       Chief Complaint   Patient presents with    Medicare AWV   .      SUBJECTIVE:  History of Present Illness         Emperatriz Simpson is a 85 y.o. female  patient returns today with a known history of interstitial lung disease, COPD, chronic kidney disease stage III, primary hypertension, and impaired glucose tolerance. She is here for evaluation.    Stiffness is reported, which is managed with Tylenol Arthritis. Her current medication regimen includes baby aspirin 81 mg, simvastatin, thyroid medication, and antihypertensive drugs.    A history of interstitial lung disease is noted. [facility] but no longer sees them.    COPD is stable.    Chronic kidney disease stage IIIb is present. A renal panel will be checked today.    Impaired glucose tolerance remains stable with a hemoglobin A1c of 5.4 in 11/2024, indicating no evidence of tolerance.    Primary hypertension is managed with amlodipine 5 mg daily, and blood pressure control is at goal.    A small knot in the breast was noted, which was present before her mammogram. The mammogram results came back normal.    Advance care planning was discussed, and she is a full code status.       Current Outpatient Medications   Medication Sig Dispense Refill    albuterol sulfate HFA (VENTOLIN HFA) 108 (90 Base) MCG/ACT inhaler Inhale 2 puffs into the lungs 4 times daily as needed for Wheezing 54 g 3    amLODIPine (NORVASC) 5 MG tablet Take 1 tablet by mouth daily 100 tablet 2    levothyroxine (SYNTHROID) 25 MCG tablet Take 1 tablet by mouth every morning (before breakfast) 100 tablet 3    simvastatin (ZOCOR) 40 MG tablet Take 1 tablet by mouth nightly 100 tablet 2    umeclidinium-vilanterol (ANORO ELLIPTA) 62.5-25 MCG/ACT inhaler Inhale 1 puff into the lungs daily 3 each 3    aspirin 81 MG EC

## 2025-03-25 ENCOUNTER — RESULTS FOLLOW-UP (OUTPATIENT)
Facility: CLINIC | Age: 85
End: 2025-03-25

## 2025-03-25 LAB
BUN SERPL-MCNC: 15 MG/DL (ref 8–27)
BUN/CREAT SERPL: 13 (ref 12–28)
CALCIUM SERPL-MCNC: 9.8 MG/DL (ref 8.7–10.3)
CHLORIDE SERPL-SCNC: 106 MMOL/L (ref 96–106)
CO2 SERPL-SCNC: 21 MMOL/L (ref 20–29)
CREAT SERPL-MCNC: 1.19 MG/DL (ref 0.57–1)
EGFRCR SERPLBLD CKD-EPI 2021: 45 ML/MIN/1.73
GLUCOSE SERPL-MCNC: 90 MG/DL (ref 70–99)
POTASSIUM SERPL-SCNC: 4.6 MMOL/L (ref 3.5–5.2)
SODIUM SERPL-SCNC: 142 MMOL/L (ref 134–144)

## 2025-04-17 RX ORDER — SIMVASTATIN 40 MG
40 TABLET ORAL NIGHTLY
Qty: 100 TABLET | Refills: 2 | Status: SHIPPED | OUTPATIENT
Start: 2025-04-17

## 2025-07-24 ENCOUNTER — OFFICE VISIT (OUTPATIENT)
Facility: CLINIC | Age: 85
End: 2025-07-24
Payer: MEDICARE

## 2025-07-24 VITALS — WEIGHT: 130.8 LBS | RESPIRATION RATE: 16 BRPM | HEIGHT: 62 IN | BODY MASS INDEX: 24.07 KG/M2

## 2025-07-24 DIAGNOSIS — J84.9 ILD (INTERSTITIAL LUNG DISEASE) (HCC): Primary | ICD-10-CM

## 2025-07-24 DIAGNOSIS — N18.31 STAGE 3A CHRONIC KIDNEY DISEASE (HCC): ICD-10-CM

## 2025-07-24 DIAGNOSIS — I10 PRIMARY HYPERTENSION: ICD-10-CM

## 2025-07-24 DIAGNOSIS — R73.02 IGT (IMPAIRED GLUCOSE TOLERANCE): ICD-10-CM

## 2025-07-24 DIAGNOSIS — J44.9 CHRONIC OBSTRUCTIVE PULMONARY DISEASE, UNSPECIFIED COPD TYPE (HCC): ICD-10-CM

## 2025-07-24 PROCEDURE — 1159F MED LIST DOCD IN RCRD: CPT | Performed by: INTERNAL MEDICINE

## 2025-07-24 PROCEDURE — 1126F AMNT PAIN NOTED NONE PRSNT: CPT | Performed by: INTERNAL MEDICINE

## 2025-07-24 PROCEDURE — 1123F ACP DISCUSS/DSCN MKR DOCD: CPT | Performed by: INTERNAL MEDICINE

## 2025-07-24 PROCEDURE — 36415 COLL VENOUS BLD VENIPUNCTURE: CPT | Performed by: INTERNAL MEDICINE

## 2025-07-24 PROCEDURE — 99214 OFFICE O/P EST MOD 30 MIN: CPT | Performed by: INTERNAL MEDICINE

## 2025-07-24 SDOH — ECONOMIC STABILITY: FOOD INSECURITY: WITHIN THE PAST 12 MONTHS, THE FOOD YOU BOUGHT JUST DIDN'T LAST AND YOU DIDN'T HAVE MONEY TO GET MORE.: NEVER TRUE

## 2025-07-24 SDOH — ECONOMIC STABILITY: FOOD INSECURITY: WITHIN THE PAST 12 MONTHS, YOU WORRIED THAT YOUR FOOD WOULD RUN OUT BEFORE YOU GOT MONEY TO BUY MORE.: NEVER TRUE

## 2025-07-24 ASSESSMENT — PATIENT HEALTH QUESTIONNAIRE - PHQ9
2. FEELING DOWN, DEPRESSED OR HOPELESS: NOT AT ALL
SUM OF ALL RESPONSES TO PHQ QUESTIONS 1-9: 0
1. LITTLE INTEREST OR PLEASURE IN DOING THINGS: NOT AT ALL

## 2025-07-24 NOTE — PROGRESS NOTES
Chief Complaint   Patient presents with    Follow-up    Hypertension     Have you been to the ER, urgent care clinic since your last visit?  Hospitalized since your last visit?   NO    Have you seen or consulted any other health care providers outside our system since your last visit?   NO             
with the patient and the intended plan as seen in the  Orders.  The patient understands and agees with the plan.  The patient has   received an after visit summary and questions were answered concerning  future plans  Patient labs and/or xrays were reviewed  Past records were reviewed.    PLAN:  Orders Placed This Encounter   Procedures    COLLECTION VENOUS BLOOD,VENIPUNCTURE    Basic Metabolic Panel     Standing Status:   Future     Expected Date:   7/24/2025     Expiration Date:   7/24/2026    Hemoglobin A1C     Standing Status:   Future     Expected Date:   7/24/2025     Expiration Date:   7/24/2026        Follow-up and Dispositions    Return in about 4 months (around 11/24/2025).                ATTENTION:   This medical record was transcribed using an electronic medical records system.  Although proofread, it may and can contain electronic and spelling errors.  Other human spelling and other errors may be present.  Corrections may be executed at a later time.  Please feel free to contact us for any clarifications as needed.

## 2025-07-25 ENCOUNTER — RESULTS FOLLOW-UP (OUTPATIENT)
Facility: CLINIC | Age: 85
End: 2025-07-25

## 2025-07-25 LAB
BUN SERPL-MCNC: 16 MG/DL (ref 8–27)
BUN/CREAT SERPL: 15 (ref 12–28)
CALCIUM SERPL-MCNC: 10.2 MG/DL (ref 8.7–10.3)
CHLORIDE SERPL-SCNC: 104 MMOL/L (ref 96–106)
CO2 SERPL-SCNC: 22 MMOL/L (ref 20–29)
CREAT SERPL-MCNC: 1.08 MG/DL (ref 0.57–1)
EGFRCR SERPLBLD CKD-EPI 2021: 50 ML/MIN/1.73
EST. AVERAGE GLUCOSE BLD GHB EST-MCNC: 120 MG/DL
GLUCOSE SERPL-MCNC: 77 MG/DL (ref 70–99)
HBA1C MFR BLD: 5.8 % (ref 4.8–5.6)
POTASSIUM SERPL-SCNC: 4.2 MMOL/L (ref 3.5–5.2)
SODIUM SERPL-SCNC: 142 MMOL/L (ref 134–144)

## 2025-08-27 RX ORDER — UMECLIDINIUM BROMIDE AND VILANTEROL TRIFENATATE 62.5; 25 UG/1; UG/1
1 POWDER RESPIRATORY (INHALATION) DAILY
Qty: 3 EACH | Refills: 3 | Status: SHIPPED | OUTPATIENT
Start: 2025-08-27

## (undated) DEVICE — REM POLYHESIVE ADULT PATIENT RETURN ELECTRODE: Brand: VALLEYLAB

## (undated) DEVICE — T4 HOOD

## (undated) DEVICE — Device

## (undated) DEVICE — INFECTION CONTROL KIT SYS

## (undated) DEVICE — SUTURE VCRL SZ 1 L27IN ABSRB VLT L36MM CT-1 1/2 CIR J341H

## (undated) DEVICE — CONTAINER,SPECIMEN,3OZ,OR STRL: Brand: MEDLINE

## (undated) DEVICE — STERILE POLYISOPRENE POWDER-FREE SURGICAL GLOVES WITH EMOLLIENT COATING: Brand: PROTEXIS

## (undated) DEVICE — HANDLE LT SNAP ON ULT DURABLE LENS FOR TRUMPF ALC DISPOSABLE

## (undated) DEVICE — GAUZE,SPONGE,2"X2",8PLY,STERILE,LF,2'S: Brand: MEDLINE

## (undated) DEVICE — 3M™ IOBAN™ 2 ANTIMICROBIAL INCISE DRAPE 6651EZ: Brand: IOBAN™ 2

## (undated) DEVICE — STERILE POLYISOPRENE POWDER-FREE SURGICAL GLOVES: Brand: PROTEXIS

## (undated) DEVICE — COVER,MAYO STAND,STERILE: Brand: MEDLINE

## (undated) DEVICE — KENDALL SCD EXPRESS SLEEVES, KNEE LENGTH, MEDIUM: Brand: KENDALL SCD

## (undated) DEVICE — 4-PORT MANIFOLD: Brand: NEPTUNE 2

## (undated) DEVICE — SLIM BODY SKIN STAPLER: Brand: APPOSE ULC

## (undated) DEVICE — SYRINGE MED 30ML STD CLR PLAS LUERLOCK TIP N CTRL DISP

## (undated) DEVICE — SUTURE VCRL SZ 2-0 L36IN ABSRB UD L40MM CT 1/2 CIR J957H

## (undated) DEVICE — SUTURE STRATAFIX SYMMETRIC PDS + SZ 1 L18IN ABSRB VLT L48MM SXPP1A400

## (undated) DEVICE — SYR LR LCK 1ML GRAD NSAF 30ML --

## (undated) DEVICE — DRAPE,REIN 53X77,STERILE: Brand: MEDLINE

## (undated) DEVICE — SYR 20ML LL STRL LF --

## (undated) DEVICE — (D)PREP SKN CHLRAPRP APPL 26ML -- CONVERT TO ITEM 371833

## (undated) DEVICE — PATIENT PROTECTIVE PAD FOR IMP UNIVERSAL LATERAL HIP POSITIONER (ULP) (6/CASE): Brand: PATIENT PROTECTIVE PAD

## (undated) DEVICE — ROCKER SWITCH PENCIL BLADE ELECTRODE, HOLSTER: Brand: EDGE

## (undated) DEVICE — TOWEL SURG W17XL27IN STD BLU COT NONFENESTRATED PREWASHED

## (undated) DEVICE — CUFF BLD PRSS AD CLTH SGL TB W/ BAYNT CONN ROUNDED CORNER

## (undated) DEVICE — SCRUB DRY SURG EZ SCRUB BRUSH PREOPERATIVE GRN

## (undated) DEVICE — STRAP,POSITIONING,KNEE/BODY,FOAM,4X60": Brand: MEDLINE

## (undated) DEVICE — IV START KIT: Brand: MEDLINE

## (undated) DEVICE — PAD BD MATTRESS 73X32 IN STD CONVOLUTED FOAM LTX FREE

## (undated) DEVICE — GARMENT,MEDLINE,DVT,INT,CALF,MED, GEN2: Brand: MEDLINE

## (undated) DEVICE — SUTURE VCRL SZ 2 L54IN ABSRB UD L65MM TP-1 1/2 CIR J880T

## (undated) DEVICE — DEVON™ KNEE AND BODY STRAP 60" X 3" (1.5 M X 7.6 CM): Brand: DEVON

## (undated) DEVICE — BLADE ELECTRODE: Brand: EDGE

## (undated) DEVICE — CONTAINER SPEC 20 ML LID NEUT BUFF FORMALIN 10 % POLYPR STS

## (undated) DEVICE — HANDPIECE SET WITH BONE CLEANING TIP AND SUCTION TUBE: Brand: INTERPULSE

## (undated) DEVICE — TRAY CATH 16F URIN MTR LTX -- CONVERT TO ITEM 363111

## (undated) DEVICE — SOFT SHIELD® COLLAGEN SHIELD, 24 HOURS (CE): Brand: SOFT SHIELD® COLLAGEN SHIELDS

## (undated) DEVICE — NEEDLE HYPO 22GA L1.5IN BLK S STL HUB POLYPR SHLD REG BVL

## (undated) DEVICE — PAD 05IN BASE 3IN PEAK M DENS CONVOLUTED FOAM

## (undated) DEVICE — BLADE SAW W073XL276IN THK0031IN CUT THK0036IN REPL SAG

## (undated) DEVICE — SOLUTION IRRIG 1000ML H2O STRL BLT

## (undated) DEVICE — SOLUTION IRRIG 500ML STRL H2O NONPYROGENIC

## (undated) DEVICE — SET GRAV CK VLV NEEDLESS ST 3 GANGED 4WAY STPCOCK HI FLO 10

## (undated) DEVICE — GAUZE SPONGES,8 PLY: Brand: CURITY

## (undated) DEVICE — AGENT OPHTH SURG DUOVISC 30MG/ML NAHA 0.35ML OR 0.5ML

## (undated) DEVICE — TIP SUCT TRNSPAR RIB SURF STD BLB RIG NVENT W/ 5IN1 CONN DYND50138] MEDLINE INDUSTRIES INC]

## (undated) DEVICE — DRSG AQUACEL SURG 3.5 X 10IN -- CONVERT TO ITEM 370183

## (undated) DEVICE — Z DUP USE 2275493 DRESSING ALGINATE POST OPERATIVE 10X3.5 IN RECT PRIMASEAL

## (undated) DEVICE — SURGICAL PROCEDURE PACK CATRCT CUST

## (undated) DEVICE — ENDOSCOPIC KIT COMPLIANCE ENDOKIT

## (undated) DEVICE — TOTAL TRAY, 16FR 10ML SIL FOLEY, URN: Brand: MEDLINE

## (undated) DEVICE — SOLUTION IRRIG 3000ML 0.9% SOD CHL FLX CONT 0797208] ICU MEDICAL INC]

## (undated) DEVICE — VISCOAT 0.50ML 27G<USA: Brand: VISCOAT